# Patient Record
Sex: FEMALE | Race: WHITE | NOT HISPANIC OR LATINO | Employment: UNEMPLOYED | URBAN - METROPOLITAN AREA
[De-identification: names, ages, dates, MRNs, and addresses within clinical notes are randomized per-mention and may not be internally consistent; named-entity substitution may affect disease eponyms.]

---

## 2017-01-11 ENCOUNTER — OFFICE VISIT - RIVER FALLS (OUTPATIENT)
Dept: FAMILY MEDICINE | Facility: CLINIC | Age: 62
End: 2017-01-11
Payer: OTHER GOVERNMENT

## 2017-01-11 ENCOUNTER — COMMUNICATION - RIVER FALLS (OUTPATIENT)
Dept: FAMILY MEDICINE | Facility: CLINIC | Age: 62
End: 2017-01-11
Payer: OTHER GOVERNMENT

## 2017-01-12 LAB
CREAT SERPL-MCNC: 0.59 MG/DL (ref 0.5–0.99)
GLUCOSE BLD-MCNC: 106 MG/DL (ref 65–99)
HBA1C MFR BLD: 5.8 %

## 2017-02-13 ENCOUNTER — PRE VISIT (OUTPATIENT)
Dept: NEUROLOGY | Facility: CLINIC | Age: 62
End: 2017-02-13

## 2017-02-13 NOTE — TELEPHONE ENCOUNTER
1.  Date/reason for appt:  2/21/17   DBS Eval, Neuro Pain/Weakness    2.  Referring provider:  [facility]    3.  Call to patient (Yes / No - short description):  No, referred.  Also followed at [facility].    All records ([facility] and [facility]) previously sent to clinic.  All imaging is in PACS.

## 2017-03-22 ENCOUNTER — OFFICE VISIT - RIVER FALLS (OUTPATIENT)
Dept: FAMILY MEDICINE | Facility: CLINIC | Age: 62
End: 2017-03-22
Payer: OTHER GOVERNMENT

## 2017-09-05 ENCOUNTER — OFFICE VISIT - RIVER FALLS (OUTPATIENT)
Dept: FAMILY MEDICINE | Facility: CLINIC | Age: 62
End: 2017-09-05
Payer: OTHER GOVERNMENT

## 2017-09-08 ENCOUNTER — OFFICE VISIT - RIVER FALLS (OUTPATIENT)
Dept: FAMILY MEDICINE | Facility: CLINIC | Age: 62
End: 2017-09-08
Payer: OTHER GOVERNMENT

## 2017-09-11 ENCOUNTER — OFFICE VISIT - RIVER FALLS (OUTPATIENT)
Dept: FAMILY MEDICINE | Facility: CLINIC | Age: 62
End: 2017-09-11
Payer: OTHER GOVERNMENT

## 2017-09-13 ENCOUNTER — OFFICE VISIT - RIVER FALLS (OUTPATIENT)
Dept: FAMILY MEDICINE | Facility: CLINIC | Age: 62
End: 2017-09-13
Payer: OTHER GOVERNMENT

## 2017-09-19 ENCOUNTER — OFFICE VISIT - RIVER FALLS (OUTPATIENT)
Dept: FAMILY MEDICINE | Facility: CLINIC | Age: 62
End: 2017-09-19
Payer: OTHER GOVERNMENT

## 2017-10-04 ENCOUNTER — OFFICE VISIT - RIVER FALLS (OUTPATIENT)
Dept: FAMILY MEDICINE | Facility: CLINIC | Age: 62
End: 2017-10-04
Payer: OTHER GOVERNMENT

## 2017-12-19 ENCOUNTER — OFFICE VISIT - RIVER FALLS (OUTPATIENT)
Dept: FAMILY MEDICINE | Facility: CLINIC | Age: 62
End: 2017-12-19
Payer: OTHER GOVERNMENT

## 2018-01-25 ENCOUNTER — OFFICE VISIT - RIVER FALLS (OUTPATIENT)
Dept: FAMILY MEDICINE | Facility: CLINIC | Age: 63
End: 2018-01-25
Payer: OTHER GOVERNMENT

## 2018-02-06 ENCOUNTER — OFFICE VISIT - RIVER FALLS (OUTPATIENT)
Dept: FAMILY MEDICINE | Facility: CLINIC | Age: 63
End: 2018-02-06
Payer: OTHER GOVERNMENT

## 2018-02-21 ENCOUNTER — OFFICE VISIT - RIVER FALLS (OUTPATIENT)
Dept: FAMILY MEDICINE | Facility: CLINIC | Age: 63
End: 2018-02-21
Payer: OTHER GOVERNMENT

## 2018-04-06 ENCOUNTER — OFFICE VISIT - RIVER FALLS (OUTPATIENT)
Dept: FAMILY MEDICINE | Facility: CLINIC | Age: 63
End: 2018-04-06
Payer: OTHER GOVERNMENT

## 2018-08-07 ENCOUNTER — OFFICE VISIT - RIVER FALLS (OUTPATIENT)
Dept: FAMILY MEDICINE | Facility: CLINIC | Age: 63
End: 2018-08-07
Payer: OTHER GOVERNMENT

## 2018-08-13 ENCOUNTER — COMMUNICATION - RIVER FALLS (OUTPATIENT)
Dept: FAMILY MEDICINE | Facility: CLINIC | Age: 63
End: 2018-08-13
Payer: OTHER GOVERNMENT

## 2018-08-13 ENCOUNTER — OFFICE VISIT - RIVER FALLS (OUTPATIENT)
Dept: FAMILY MEDICINE | Facility: CLINIC | Age: 63
End: 2018-08-13
Payer: OTHER GOVERNMENT

## 2018-08-13 LAB
CREAT SERPL-MCNC: 0.62 MG/DL (ref 0.57–1.11)
GLUCOSE BLD-MCNC: 98 MG/DL (ref 65–100)

## 2018-10-11 ENCOUNTER — OFFICE VISIT - RIVER FALLS (OUTPATIENT)
Dept: FAMILY MEDICINE | Facility: CLINIC | Age: 63
End: 2018-10-11
Payer: OTHER GOVERNMENT

## 2018-12-11 ENCOUNTER — OFFICE VISIT - RIVER FALLS (OUTPATIENT)
Dept: FAMILY MEDICINE | Facility: CLINIC | Age: 63
End: 2018-12-11
Payer: OTHER GOVERNMENT

## 2018-12-13 ENCOUNTER — OFFICE VISIT - RIVER FALLS (OUTPATIENT)
Dept: FAMILY MEDICINE | Facility: CLINIC | Age: 63
End: 2018-12-13
Payer: OTHER GOVERNMENT

## 2019-02-17 ENCOUNTER — OFFICE VISIT - RIVER FALLS (OUTPATIENT)
Dept: FAMILY MEDICINE | Facility: CLINIC | Age: 64
End: 2019-02-17
Payer: OTHER GOVERNMENT

## 2019-04-05 ENCOUNTER — OFFICE VISIT - RIVER FALLS (OUTPATIENT)
Dept: FAMILY MEDICINE | Facility: CLINIC | Age: 64
End: 2019-04-05
Payer: OTHER GOVERNMENT

## 2019-05-09 ENCOUNTER — COMMUNICATION - RIVER FALLS (OUTPATIENT)
Dept: FAMILY MEDICINE | Facility: CLINIC | Age: 64
End: 2019-05-09
Payer: OTHER GOVERNMENT

## 2019-05-17 ENCOUNTER — OFFICE VISIT - RIVER FALLS (OUTPATIENT)
Dept: FAMILY MEDICINE | Facility: CLINIC | Age: 64
End: 2019-05-17
Payer: OTHER GOVERNMENT

## 2019-06-17 ENCOUNTER — OFFICE VISIT - RIVER FALLS (OUTPATIENT)
Dept: FAMILY MEDICINE | Facility: CLINIC | Age: 64
End: 2019-06-17
Payer: OTHER GOVERNMENT

## 2019-07-17 ENCOUNTER — OFFICE VISIT - RIVER FALLS (OUTPATIENT)
Dept: FAMILY MEDICINE | Facility: CLINIC | Age: 64
End: 2019-07-17
Payer: OTHER GOVERNMENT

## 2019-09-04 ENCOUNTER — OFFICE VISIT - RIVER FALLS (OUTPATIENT)
Dept: FAMILY MEDICINE | Facility: CLINIC | Age: 64
End: 2019-09-04
Payer: OTHER GOVERNMENT

## 2019-09-10 ENCOUNTER — AMBULATORY - HEALTHEAST (OUTPATIENT)
Dept: PALLIATIVE MEDICINE | Facility: OTHER | Age: 64
End: 2019-09-10

## 2019-09-10 DIAGNOSIS — G89.29 CHRONIC PAIN: ICD-10-CM

## 2019-09-10 DIAGNOSIS — F32.9 MAJOR DEPRESSIVE DISORDER: ICD-10-CM

## 2019-09-10 DIAGNOSIS — G57.10 MERALGIA PARESTHETICA: ICD-10-CM

## 2019-09-10 DIAGNOSIS — M79.2 NEUROPATHIC PAIN: ICD-10-CM

## 2019-09-10 DIAGNOSIS — I63.9 CEREBROVASCULAR ACCIDENT (H): ICD-10-CM

## 2019-09-24 ENCOUNTER — OFFICE VISIT - RIVER FALLS (OUTPATIENT)
Dept: FAMILY MEDICINE | Facility: CLINIC | Age: 64
End: 2019-09-24
Payer: OTHER GOVERNMENT

## 2019-11-15 ENCOUNTER — OFFICE VISIT - RIVER FALLS (OUTPATIENT)
Dept: FAMILY MEDICINE | Facility: CLINIC | Age: 64
End: 2019-11-15
Payer: OTHER GOVERNMENT

## 2019-12-03 ENCOUNTER — OFFICE VISIT - RIVER FALLS (OUTPATIENT)
Dept: FAMILY MEDICINE | Facility: CLINIC | Age: 64
End: 2019-12-03
Payer: OTHER GOVERNMENT

## 2019-12-03 LAB
BASOPHILS # BLD MANUAL: 0 10*3/UL
BASOPHILS NFR BLD MANUAL: 0.2 %
EOSINOPHIL # BLD MANUAL: 0.1 10*3/UL
EOSINOPHIL NFR BLD MANUAL: 0.9 %
ERYTHROCYTE [DISTWIDTH] IN BLOOD BY AUTOMATED COUNT: 14.1 % (ref 11.5–15.5)
HCT VFR BLD AUTO: 44.8 % (ref 33–51)
HGB BLD-MCNC: 14.5 G/DL (ref 12–16)
LYMPHOCYTES # BLD MANUAL: 2.2 10*3/UL (ref 0.9–2.9)
LYMPHOCYTES NFR BLD MANUAL: 17.8 %
MCH RBC QN AUTO: 29.3 PG (ref 26–34)
MCHC RBC AUTO-ENTMCNC: 32.4 G/DL (ref 32–36)
MCV RBC AUTO: 91 FL (ref 80–100)
MONOCYTES # BLD MANUAL: 0.8 10*3/UL
MONOCYTES NFR BLD MANUAL: 6.6 %
NEUTROPHILS # BLD MANUAL: 9.3 10*3/UL (ref 1.7–7)
NEUTROPHILS NFR BLD MANUAL: 74.5 %
PLATELET # BLD AUTO: 229 10*3/UL (ref 140–440)
PMV BLD: 9.2 FL (ref 6.5–11)
RBC # BLD AUTO: 4.95 10*6/UL (ref 4–5.2)
WBC # BLD AUTO: 12.5 10*3/UL (ref 4.5–11)

## 2020-01-10 ENCOUNTER — OFFICE VISIT - RIVER FALLS (OUTPATIENT)
Dept: FAMILY MEDICINE | Facility: CLINIC | Age: 65
End: 2020-01-10
Payer: MEDICARE

## 2020-01-14 ENCOUNTER — OFFICE VISIT - RIVER FALLS (OUTPATIENT)
Dept: FAMILY MEDICINE | Facility: CLINIC | Age: 65
End: 2020-01-14
Payer: MEDICARE

## 2020-03-18 ENCOUNTER — OFFICE VISIT - RIVER FALLS (OUTPATIENT)
Dept: FAMILY MEDICINE | Facility: CLINIC | Age: 65
End: 2020-03-18
Payer: MEDICARE

## 2020-06-23 ENCOUNTER — COMMUNICATION - RIVER FALLS (OUTPATIENT)
Dept: FAMILY MEDICINE | Facility: CLINIC | Age: 65
End: 2020-06-23
Payer: MEDICARE

## 2020-08-06 ENCOUNTER — OFFICE VISIT - RIVER FALLS (OUTPATIENT)
Dept: FAMILY MEDICINE | Facility: CLINIC | Age: 65
End: 2020-08-06
Payer: MEDICARE

## 2020-10-08 ENCOUNTER — OFFICE VISIT - RIVER FALLS (OUTPATIENT)
Dept: FAMILY MEDICINE | Facility: CLINIC | Age: 65
End: 2020-10-08
Payer: MEDICARE

## 2021-01-26 ENCOUNTER — OFFICE VISIT - RIVER FALLS (OUTPATIENT)
Dept: FAMILY MEDICINE | Facility: CLINIC | Age: 66
End: 2021-01-26
Payer: MEDICARE

## 2021-03-04 ENCOUNTER — OFFICE VISIT - RIVER FALLS (OUTPATIENT)
Dept: FAMILY MEDICINE | Facility: CLINIC | Age: 66
End: 2021-03-04
Payer: MEDICARE

## 2021-05-13 ENCOUNTER — OFFICE VISIT - RIVER FALLS (OUTPATIENT)
Dept: FAMILY MEDICINE | Facility: CLINIC | Age: 66
End: 2021-05-13
Payer: MEDICARE

## 2021-07-02 ENCOUNTER — OFFICE VISIT - RIVER FALLS (OUTPATIENT)
Dept: FAMILY MEDICINE | Facility: CLINIC | Age: 66
End: 2021-07-02
Payer: MEDICARE

## 2021-07-19 ENCOUNTER — OFFICE VISIT - RIVER FALLS (OUTPATIENT)
Dept: FAMILY MEDICINE | Facility: CLINIC | Age: 66
End: 2021-07-19
Payer: MEDICARE

## 2021-09-10 ENCOUNTER — OFFICE VISIT - RIVER FALLS (OUTPATIENT)
Dept: FAMILY MEDICINE | Facility: CLINIC | Age: 66
End: 2021-09-10
Payer: MEDICARE

## 2021-10-07 ENCOUNTER — COMMUNICATION - RIVER FALLS (OUTPATIENT)
Dept: FAMILY MEDICINE | Facility: CLINIC | Age: 66
End: 2021-10-07
Payer: MEDICARE

## 2021-11-05 ENCOUNTER — OFFICE VISIT - RIVER FALLS (OUTPATIENT)
Dept: FAMILY MEDICINE | Facility: CLINIC | Age: 66
End: 2021-11-05
Payer: MEDICARE

## 2021-12-23 ENCOUNTER — OFFICE VISIT - RIVER FALLS (OUTPATIENT)
Dept: FAMILY MEDICINE | Facility: CLINIC | Age: 66
End: 2021-12-23
Payer: MEDICARE

## 2022-01-20 ENCOUNTER — OFFICE VISIT - RIVER FALLS (OUTPATIENT)
Dept: FAMILY MEDICINE | Facility: CLINIC | Age: 67
End: 2022-01-20
Payer: MEDICARE

## 2022-02-11 VITALS
DIASTOLIC BLOOD PRESSURE: 67 MMHG | BODY MASS INDEX: 26.02 KG/M2 | HEART RATE: 93 BPM | TEMPERATURE: 97.6 F | SYSTOLIC BLOOD PRESSURE: 91 MMHG | WEIGHT: 151.6 LBS

## 2022-02-12 VITALS
DIASTOLIC BLOOD PRESSURE: 64 MMHG | SYSTOLIC BLOOD PRESSURE: 112 MMHG | BODY MASS INDEX: 28.49 KG/M2 | DIASTOLIC BLOOD PRESSURE: 80 MMHG | SYSTOLIC BLOOD PRESSURE: 119 MMHG | HEART RATE: 106 BPM | HEART RATE: 72 BPM | WEIGHT: 166 LBS | TEMPERATURE: 97.8 F | TEMPERATURE: 97.6 F

## 2022-02-12 VITALS
WEIGHT: 166.5 LBS | DIASTOLIC BLOOD PRESSURE: 83 MMHG | TEMPERATURE: 97.9 F | HEART RATE: 89 BPM | SYSTOLIC BLOOD PRESSURE: 136 MMHG | BODY MASS INDEX: 28.58 KG/M2

## 2022-02-12 VITALS
SYSTOLIC BLOOD PRESSURE: 103 MMHG | SYSTOLIC BLOOD PRESSURE: 128 MMHG | BODY MASS INDEX: 25.58 KG/M2 | HEART RATE: 79 BPM | DIASTOLIC BLOOD PRESSURE: 77 MMHG | WEIGHT: 149 LBS | TEMPERATURE: 97.2 F | SYSTOLIC BLOOD PRESSURE: 144 MMHG | DIASTOLIC BLOOD PRESSURE: 82 MMHG | SYSTOLIC BLOOD PRESSURE: 107 MMHG | HEART RATE: 74 BPM | DIASTOLIC BLOOD PRESSURE: 72 MMHG | HEART RATE: 89 BPM | HEART RATE: 81 BPM | BODY MASS INDEX: 25.23 KG/M2 | WEIGHT: 147 LBS | DIASTOLIC BLOOD PRESSURE: 88 MMHG | TEMPERATURE: 97.6 F | TEMPERATURE: 97.3 F

## 2022-02-12 VITALS
SYSTOLIC BLOOD PRESSURE: 126 MMHG | DIASTOLIC BLOOD PRESSURE: 73 MMHG | HEART RATE: 91 BPM | HEART RATE: 84 BPM | BODY MASS INDEX: 26.5 KG/M2 | DIASTOLIC BLOOD PRESSURE: 88 MMHG | TEMPERATURE: 98.6 F | SYSTOLIC BLOOD PRESSURE: 102 MMHG | TEMPERATURE: 97.9 F | WEIGHT: 154.4 LBS

## 2022-02-12 VITALS
SYSTOLIC BLOOD PRESSURE: 117 MMHG | HEART RATE: 111 BPM | TEMPERATURE: 97.8 F | WEIGHT: 152.2 LBS | BODY MASS INDEX: 26.13 KG/M2 | DIASTOLIC BLOOD PRESSURE: 77 MMHG

## 2022-02-12 VITALS
SYSTOLIC BLOOD PRESSURE: 102 MMHG | HEART RATE: 91 BPM | HEART RATE: 114 BPM | DIASTOLIC BLOOD PRESSURE: 97 MMHG | TEMPERATURE: 98.4 F | TEMPERATURE: 97.4 F | DIASTOLIC BLOOD PRESSURE: 71 MMHG | SYSTOLIC BLOOD PRESSURE: 144 MMHG

## 2022-02-12 VITALS
WEIGHT: 160 LBS | SYSTOLIC BLOOD PRESSURE: 122 MMHG | BODY MASS INDEX: 27.46 KG/M2 | TEMPERATURE: 97.3 F | DIASTOLIC BLOOD PRESSURE: 84 MMHG | HEART RATE: 102 BPM

## 2022-02-12 VITALS — SYSTOLIC BLOOD PRESSURE: 137 MMHG | DIASTOLIC BLOOD PRESSURE: 86 MMHG | TEMPERATURE: 96.6 F | HEART RATE: 71 BPM

## 2022-02-12 VITALS
TEMPERATURE: 97.8 F | HEART RATE: 91 BPM | DIASTOLIC BLOOD PRESSURE: 94 MMHG | SYSTOLIC BLOOD PRESSURE: 157 MMHG | WEIGHT: 157.8 LBS | BODY MASS INDEX: 27.09 KG/M2

## 2022-02-16 NOTE — PROGRESS NOTES
Patient:   NICK TRUONG            MRN: [id number]            FIN: [id number]               Age:   63 years     Sex:  Female     :  1955   Associated Diagnoses:   Chronic pain   Author:   Stanford Gayle MD      Visit Information      Date of Service: 2018 12:42 pm  Performing Location: [facility]  Encounter#: [id number]      Primary Care Provider (PCP):  Stanford Gayle MD    NPI# [id number]      Referring Provider:  Stanford Gayle MD    NPI# [id number]      Chief Complaint      History of Present Illness   Neuropathic pain   right sided continues  On gabapentin   No other new sxs  Both upper and lowe r extremity complaints  cymbalta as well         Review of Systems   Constitutional:  Negative except as documented in history of present illness.    Respiratory:  Negative.    Cardiovascular:  Negative.    Gastrointestinal:  Negative.    Genitourinary:  Negative.    Musculoskeletal:  Negative except as documented in history of present illness.    Integumentary:  Negative.    Neurologic:  Negative except as documented in history of present illness.              Health Status   Allergies:    Allergic Reactions (Selected)  Severity Not Documented  TraZODone (Anxiety)   Medications:  (Selected)   Prescriptions  Prescribed  Cymbalta 30 mg oral delayed release capsule: 1 cap(s) ( 30 mg ), po, daily, # 30 cap(s), 0 Refill(s), Type: Maintenance  oxyCODONE 5 mg oral capsule: 1 cap(s) ( 5 mg ), po, q12 hrs, # 30 cap(s), 0 Refill(s), Type: Maintenance  Documented Medications  Documented  Dulcolax Laxative: ( 5 mg ), po, daily, 0 Refill(s), Type: Maintenance  Incruse Ellipta 62.5 mcg/inh inhalation powder: ( 62.5 mcg ), inh, q 24 hrs, 0 Refill(s), Type: Maintenance  OxyCONTIN 10 mg oral tablet, extended release: 1 tab(s) ( 10 mg ), PO, q12hr, 0 Refill(s), Type: Maintenance  RisperDAL 1 mg oral tablet: See Instructions, Instructions: 2mg qam, 1mg in afternoon and 1mg at HS., 0 Refill(s),  Type: Maintenance  Tylenol 500 mg oral tablet: 1 tab(s) ( 500 mg ), PO, q4hr, PRN: for pain, 0 Refill(s), Type: Maintenance  Ventolin HFA 90 mcg/inh inhalation aerosol: 2 puff(s), inh, q4 hrs, PRN: as needed for wheezing, 0 Refill(s), Type: Maintenance  aspirin 81 mg oral tablet: 1 tab(s) ( 81 mg ), po, hs, 0 Refill(s), Type: Maintenance  bisacodyl: ( 5 mg ), po, daily, 0 Refill(s), Type: Maintenance  cholecalciferol 1000 intl units oral capsule: See Instructions, Instructions: 1 cap(s) po bid, 0 Refill(s), Type: Maintenance  clonazePAM 0.5 mg oral tablet: 0.5 tab(s) ( 0.25 mg ), po, qam, Instructions: and 1 tab(s) at hs, 0 Refill(s), Type: Maintenance  gabapentin 600 mg oral tablet: 1 tab(s) ( 600 mg ), PO, TID, # 270 tab(s), 0 Refill(s), Type: Maintenance  lidocaine 5% topical film: See Instructions, Instructions: 1 patch(es) to painful area of skin for up to 12hrs within a 24hr period, 0 Refill(s), Type: Maintenance  omega-3 polyunsaturated fatty acids 1000 mg oral capsule: 1 cap(s) ( 1,000 mg ), po, daily, 0 Refill(s), Type: Maintenance,    Medications          *denotes recorded medication          Cymbalta 30 mg oral delayed release capsule: 30 mg, 1 cap(s), po, daily, 30 cap(s), 0 Refill(s).          *Tylenol 500 mg oral tablet: 500 mg, 1 tab(s), PO, q4hr, PRN: for pain, 0 Refill(s).          *Ventolin HFA 90 mcg/inh inhalation aerosol: 2 puff(s), inh, q4 hrs, PRN: as needed for wheezing, 0 Refill(s).          *aspirin 81 mg oral tablet: 81 mg, 1 tab(s), po, hs, 0 Refill(s).          *bisacodyl: 5 mg, po, daily, 0 Refill(s).          *Dulcolax Laxative: 5 mg, po, daily, 0 Refill(s).          *cholecalciferol 1000 intl units oral capsule: See Instructions, 1 cap(s) po bid, 0 Refill(s).          *clonazePAM 0.5 mg oral tablet: 0.25 mg, 0.5 tab(s), po, qam, and 1 tab(s) at hs, 0 Refill(s).          *gabapentin 600 mg oral tablet: 600 mg, 1 tab(s), PO, TID, 270 tab(s), 0 Refill(s).          *lidocaine 5% topical  film: See Instructions, 1 patch(es) to painful area of skin for up to 12hrs within a 24hr period, 0 Refill(s).          *omega-3 polyunsaturated fatty acids 1000 mg oral capsule: 1,000 mg, 1 cap(s), po, daily, 0 Refill(s).          *OxyCONTIN 10 mg oral tablet, extended release: 10 mg, 1 tab(s), PO, q12hr, 0 Refill(s).          oxyCODONE 5 mg oral capsule: 5 mg, 1 cap(s), po, q12 hrs, 30 cap(s), 0 Refill(s).          *RisperDAL 1 mg oral tablet: See Instructions, 2mg qam, 1mg in afternoon and 1mg at HS., 0 Refill(s).          *Incruse Ellipta 62.5 mcg/inh inhalation powder: 62.5 mcg, inh, q 24 hrs, 0 Refill(s).     Problem list:    All Problems  Alcoholic hepatitis / SNOMED CT 290146912 / Confirmed  Anemia / SNOMED CT 521600124 / Confirmed  Anxiety / SNOMED CT 3607846467 / Confirmed  Dysphagia / SNOMED CT 669896883 / Confirmed  Cerebral edema / SNOMED CT 8318945 / Confirmed  COPD (chronic obstructive pulmonary disease) / SNOMED CT 89434373 / Confirmed  Chronic pain / SNOMED CT 244378376 / Confirmed  CVA (cerebrovascular accident) / SNOMED CT 986951515 / Confirmed  Diabetes mellitus / SNOMED CT 397322898 / Confirmed  Left foot drop / SNOMED CT 16529015 / Confirmed  Headache / SNOMED CT 8763183633 / Confirmed  Tachycardia / SNOMED CT 7074681302 / Confirmed  Hyperglycemia / SNOMED CT 400195350 / Confirmed  Cognitive impairment / SNOMED CT 8815349306 / Confirmed  Left hemiparesis / SNOMED CT 796095795 / Confirmed  Left homonymous hemianopsia / SNOMED CT 43543073 / Confirmed  Hypotension / SNOMED CT 703240688 / Confirmed  Major depressive disorder / SNOMED CT 4789686002 / Confirmed  Cervical cancer / SNOMED CT 642667208 / Confirmed  Meralgia paraesthetica / SNOMED CT 799926820 / Confirmed  Neuropathic pain / SNOMED CT 282582101 / Confirmed  Neuropathy / SNOMED CT 1880285753 / Confirmed  Urinary retention / SNOMED CT 2535244132 / Confirmed  Thrombocytopenia / SNOMED CT 8486329632 / Confirmed  Seizure / SNOMED CT 454414364  / Confirmed  Tobacco user / SNOMED CT 891180016 / Probable  Vitamin D deficiency / SNOMED CT 54070762 / Confirmed  Vocal cord paralysis / SNOMED CT 769686607 / Confirmed      Histories   Past Medical History:    Active  Cervical cancer (312232983): Onset on 8/3/2015 at 60 years.  CVA (cerebrovascular accident) (090117328): Onset on 4/28/2015 at 59 years.  Left foot drop (89416314): Onset on 4/20/2015 at 59 years.  Neuropathy (4799852625): Onset on 3/20/2015 at 59 years.  Comments:  11/30/2016 CST 3:50 PM CST - Kitty Shook  Pain of lower extremity.  Urinary retention (5340372584): Onset on 3/12/2015 at 59 years.  Meralgia paraesthetica (505801571): Onset on 6/21/2014 at 59 years.  Chronic pain (192856948): Onset on 5/27/2014 at 58 years.  Cerebral edema (4543014): Onset on 1/10/2014 at 58 years.  Headache (0221307935): Onset on 1/10/2014 at 58 years.  Tachycardia (4850305035): Onset on 8/6/2012 at 57 years.  Seizure (366671295): Onset on 5/30/2011 at 56 years.  Cognitive impairment (8484945276): Onset on 10/17/2010 at 55 years.  Left homonymous hemianopsia (93179663): Onset on 10/17/2010 at 55 years.  Dysphagia (758927560): Onset on 10/17/2010 at 55 years.  Vitamin D deficiency (12385990): Onset on 10/14/2010 at 55 years.  Vocal cord paralysis (996975815): Onset on 10/12/2010 at 55 years.  Hyperglycemia (622358578): Onset on 9/30/2010 at 55 years.  Major depressive disorder (4888568272): Onset on 10/11/2007 at 52 years.  Left hemiparesis (385710415)  Hypotension (601482943)  Thrombocytopenia (4215512661)  Anemia (695937506)  Alcoholic hepatitis (319437830)  COPD (chronic obstructive pulmonary disease) (97303460)  Diabetes mellitus (573221860)  Comments:  4/13/2018 CDT 10:19 AM CDT - Valdez Isaacice  BP Goal: <130/80  Anxiety (3565674983)  Neuropathic pain (914548530)   Family History:    Diabetes mellitus  Grandmother (M)  Grandfather (M)  Aunt (M)  Breast cancer  Mother (Carmen): onset at 40 .  Aunt (M)  Sister:  onset at 30 .  Heart disease  Grandfather (M)     Procedure history:    [facility]SO - Total abdominal hysterectomy and bilateral salpingo-oophorectomy (SNOMED CT 2600208469) on 2012 at 57 Years.   delivery (SNOMED CT 9159940373) in  at 26 Years.  Hysteroscopy (SNOMED CT 673947194).   Social History:        Tobacco Assessment: Current            Current every day smoker, Cigarettes, Total pack years: 20.                     Comments:                      2016 - Kitty Shook                     1/2 pack per day.      Substance Abuse Assessment: Denies Substance Abuse      Home and Environment Assessment            Marital status: .        Physical Examination   VS/Measurements   General:  Alert and oriented, No acute distress.    Musculoskeletal:  No tenderness, No swelling.    Integumentary:  No rash.    Neurologic:  Alert, Oriented, right sided hemiplegia.       Impression and Plan   Diagnosis     Chronic pain (YDI41-JR G89.29).     Course:  Worsening.    Plan:  no change.    Patient Instructions:       Counseled: Patient, Regarding diagnosis, Regarding treatment, Regarding medications.

## 2022-02-16 NOTE — TELEPHONE ENCOUNTER
---------------------  From: Steffanie Thompson CMA (Phone Messages Pool ([id number]))   To: TFS Message Pool ([id number]);     Sent: 7/12/2021 11:47:13 AM CDT  Subject: med refill request     Patient called asking for Lidocaine patches to be renewed. Last filled: 8/14/2020. Patient aware TFS out of clinic today.     [facility]---------------------  From: Renetta Kraus CMA (TFS Message Pool ([id number]_[facility]))   To: Stanford Gayle MD;     Sent: 7/13/2021 9:18:05 AM CDT  Subject: FW: med refill request     Last filled 8/14/20 #30 and 11 RF.    Last visit 7/2/21 jannie lau TFS.---------------------  From: Stanford Gayle MD   To: TFS Message Pool ([id number] [facility] [place]);     Sent: 7/13/2021 9:35:10 AM CDT  Subject: RE: med refill request     ok to refill same # and directionsRefilled.Carmen calling stating she called 3 weeks ago to get refill of pt's lidocaine patches. Told her that Rx was sent in 7/13. Rx sent to [facility] but suppose to go to [facility] per original message.  Rx resent to correct pharmacy.

## 2022-02-16 NOTE — PROGRESS NOTES
Patient:   NICK TRUONG            MRN: [id number]            FIN: [id number]               Age:   64 years     Sex:  Female     :  1955   Associated Diagnoses:   Chronic pain   Author:   Stanford Gayle MD      Visit Information      Date of Service: 2020 08:30 am  Performing Location: [facility]  Encounter#: [id number]      Primary Care Provider (PCP):  Stanford Gayle MD    NPI# [id number]      Referring Provider:  No referring provider recorded for selected visit.      Chief Complaint      History of Present Illness   Neuropathic pain   right sided continues   On gabapentin   No other new sxs  Both upper and lower extremity complaints  cymbalta as well         Review of Systems   Constitutional:  Negative except as documented in history of present illness.    Respiratory:  Negative.    Cardiovascular:  Negative.    Gastrointestinal:  Negative.    Genitourinary:  Negative.    Musculoskeletal:  Negative except as documented in history of present illness.    Integumentary:  Negative.    Neurologic:  Negative except as documented in history of present illness.              Health Status   Allergies:    Allergic Reactions (Selected)  Severity Not Documented  TraZODone (Anxiety)   Medications:  (Selected)   Prescriptions  Prescribed  Cymbalta 30 mg oral delayed release capsule: = 2 cap(s) ( 60 mg ), po, bid, # 360 cap(s), 3 Refill(s), Type: Maintenance  Luminas Pain Relief Patch: Luminas Pain Relief Patch, See Instructions, Instructions: Apply 1 patch every 24 hours, Supply, # 1 box(es), 11 Refill(s), Type: Maintenance  clonazePAM 0.125 mg oral tablet, disintegratin tab(s) ( 0.125 mg ), Oral, bid, # 60 tab(s), 0 Refill(s), Type: Maintenance  lidocaine 5% topical film: 1 patch(es), Topical, daily, # 30 patch(es), 11 Refill(s), Type: Maintenance  oxyCODONE 5 mg oral tablet: = 1 tab(s) ( 5 mg ), Oral, hs, # 30 tab(s), 0 Refill(s), Type: Maintenance  Documented  Medications  Documented  Dulcolax Laxative: = 2 tab(s), po, daily, 0 Refill(s), Type: Maintenance  Incruse Ellipta 62.5 mcg/inh inhalation powder: ( 62.5 mcg ), inh, q 24 hrs, 0 Refill(s), Type: Maintenance  MiraLax oral powder for reconstitution: ( 17 gm ), Oral, every other day, 0 Refill(s), Type: Maintenance  RisperDAL 1 mg oral tablet: = 1 tab(s) ( 1 mg ), Oral, daily, Instructions: 1mg BID and 0.5mg at HS, 0 Refill(s), Type: Maintenance  Ventolin HFA 90 mcg/inh inhalation aerosol: 2 puff(s), inh, q4 hrs, PRN: as needed for wheezing, 0 Refill(s), Type: Maintenance  acetaminophen 500 mg oral tablet: = 2 tab(s) ( 1,000 mg ), Oral, bid, Instructions: and 2 tabs prn q 6 hours for pain 6-10, 1 tab prn  for pain 1-5, PRN: as needed for pain, 0 Refill(s), Type: Maintenance  aspirin 81 mg oral tablet: 1 tab(s) ( 81 mg ), po, hs, 0 Refill(s), Type: Maintenance  gabapentin 300 mg oral capsule: = 3 cap(s) ( 900 mg ), Oral, hs, Instructions: takes 600mg Qam and 600mg at lunch and 900mg at HS, 0 Refill(s), Type: Maintenance   Problem list:    All Problems (Selected)  Alcoholic hepatitis / SNOMED CT 541633575 / Confirmed  Anemia / SNOMED CT 622122773 / Confirmed  Anxiety / SNOMED CT 6439496157 / Confirmed  Dysphagia / SNOMED CT 379137489 / Confirmed  Cerebral edema / SNOMED CT 3079444 / Confirmed  COPD (chronic obstructive pulmonary disease) / SNOMED CT 57354179 / Confirmed  Chronic pain / SNOMED CT 019654305 / Confirmed  CVA (cerebrovascular accident) / SNOMED CT 391951982 / Confirmed  Diabetes mellitus / SNOMED CT 081648146 / Confirmed  Left foot drop / SNOMED CT 24657505 / Confirmed  Headache / SNOMED CT 8473921009 / Confirmed  Tachycardia / SNOMED CT 7239309574 / Confirmed  Hyperglycemia / SNOMED CT 426486287 / Confirmed  Cognitive impairment / SNOMED CT 7756419275 / Confirmed  Left hemiparesis / SNOMED CT 438294796 / Confirmed  Left homonymous hemianopsia / SNOMED CT 97467092 / Confirmed  Hypotension / SNOMED CT  854356393 / Confirmed  Major depressive disorder / SNOMED CT 6726618713 / Confirmed  Meralgia paraesthetica / SNOMED CT 743745539 / Confirmed  Neuropathic pain / SNOMED CT 362420084 / Confirmed  Neuropathy / SNOMED CT 9228362070 / Confirmed  Urinary retention / SNOMED CT 1569212032 / Confirmed  Thrombocytopenia / SNOMED CT 7301675994 / Confirmed  Tobacco user / SNOMED CT 589898443 / Probable  Vitamin D deficiency / SNOMED CT 43799652 / Confirmed  Vocal cord paralysis / SNOMED CT 697570020 / Confirmed      Histories   Social History:        Tobacco Assessment: Current            Current every day smoker, Cigarettes, Total pack years: 20.                     Comments:                      11/30/2016 - Kitty Shook                     1/2 pack per day.      Substance Abuse Assessment: Denies Substance Abuse      Employment and Education Assessment            Disability      Home and Environment Assessment            Marital status: .        Physical Examination   VS/Measurements   General:  Alert and oriented, No acute distress.    Neck:  Supple.    Respiratory:  Respirations are non-labored.    Cardiovascular:  Normal rate, Regular rhythm.    Musculoskeletal:  No tenderness, No swelling.    Integumentary:  No rash.    Neurologic:  Alert, Oriented, right sided hemiplegia.       Impression and Plan   Diagnosis     Chronic pain (SWQ14-HA G89.29).     Course:  Unchanged.    Plan:  tapering narcotics.    Patient Instructions:       Counseled: Patient, Regarding diagnosis, Regarding treatment, Regarding medications.

## 2022-02-16 NOTE — NURSING NOTE
Comprehensive Intake Entered On:  12/3/2019 10:31 AM CST    Performed On:  12/3/2019 10:30 AM CST by Scarlett Isaac               Summary   Chief Complaint :   Possible pneumonia per Kinni   Systolic Blood Pressure :   102 mmHg   Diastolic Blood Pressure :   71 mmHg   Mean Arterial Pressure :   81 mmHg   Peripheral Pulse Rate :   114 bpm (HI)    Temperature Tympanic :   97.4 DegF(Converted to: 36.3 DegC)  (LOW)    Scarlett Isaac - 12/3/2019 10:30 AM CST

## 2022-02-16 NOTE — PROGRESS NOTES
Patient:   NICK TRUONG            MRN: [id number]            FIN: [id number]               Age:   61 years     Sex:  Female     :  1955   Associated Diagnoses:   Neuropathy; Major depressive disorder; CVA (cerebrovascular accident); Cognitive impairment   Author:   Stanford Gayle MD      Visit Information      Date of Service: 2017 02:39 pm  Performing Location: [facility]  Encounter#: [id number]      Primary Care Provider (PCP):  Stanford Gayle MD    NPI# [id number]      Referring Provider:  No referring provider recorded for selected visit.      Chief Complaint   3/22/2017 2:44 PM CDT    Discuss recommendations made by chiropractor        History of Present Illness   chief complaint and symptoms as noted above confirmed with patient       Review of Systems            Health Status   Allergies:    Allergic Reactions (Selected)  Severity Not Documented  TraZODone (Anxiety)   Medications:  (Selected)   Documented Medications  Documented  MiraLax oral powder for reconstitution: ( 17 gm ), po, daily, 0 Refill(s), Type: Maintenance  OxyCONTIN 10 mg oral tablet, extended release: 1 tab(s) ( 10 mg ), PO, q12hr, 0 Refill(s), Type: Maintenance  RisperDAL 1 mg oral tablet: 1 tab(s) ( 1 mg ), PO, BID, Instructions: 1 tablet po in the morning, 1 tablet at noon and 2 tablets at bedtime, # 60 tab(s), 0 Refill(s), Type: Maintenance  Spiriva 18 mcg inhalation capsule: 1 cap(s) ( 18 mcg ), inh, daily, 0 Refill(s), Type: Maintenance  Tylenol 500 mg oral tablet: 1 tab(s) ( 500 mg ), PO, q4hr, PRN: for pain, 0 Refill(s), Type: Maintenance  Ventolin HFA 90 mcg/inh inhalation aerosol: 2 puff(s), inh, q4 hrs, PRN: as needed for wheezing, 0 Refill(s), Type: Maintenance  aspirin 81 mg oral tablet: 1 tab(s) ( 81 mg ), po, hs, 0 Refill(s), Type: Maintenance  bisacodyl: ( 5 mg ), po, daily, 0 Refill(s), Type: Maintenance  cholecalciferol 1000 intl units oral capsule: See Instructions, Instructions: 1  cap(s) po bid, 0 Refill(s), Type: Maintenance  clonazePAM 0.5 mg oral tablet: 0.5 tab(s) ( 0.25 mg ), po, qam, Instructions: and 1 tab(s) at hs, 0 Refill(s), Type: Maintenance  gabapentin 300 mg oral capsule: 1 cap(s) ( 300 mg ), po, tid, 0 Refill(s), Type: Maintenance  lamoTRIgine 25 mg oral tablet: 1 tab(s) ( 25 mg ), PO, BID, 0 Refill(s), Type: Maintenance  lidocaine 5% topical film: See Instructions, Instructions: 1 patch(es) to painful area of skin for up to 12hrs within a 24hr period, 0 Refill(s), Type: Maintenance  multivitamin with minerals (w/ Iron): 1 tablet, po, daily, 0 Refill(s), Type: Maintenance  omega-3 polyunsaturated fatty acids 1000 mg oral capsule: 1 cap(s) ( 1,000 mg ), po, daily, 0 Refill(s), Type: Maintenance  oxyCODONE 5 mg oral capsule: 1 cap(s) ( 5 mg ), po, q6 hrs, Instructions: 1-2 tablets po every 4 hrs if needed for pain. Maximum of 2 tablets per day, 0 Refill(s), Type: Maintenance  venlafaxine 75 mg oral tablet: 2 tab(s) ( 150 mg ), po, daily, 0 Refill(s), Type: Maintenance   Problem list:    All Problems  CVA (cerebrovascular accident) / SNOMED CT 898284596 / Confirmed  Left hemiparesis / SNOMED CT 304494725 / Confirmed  Seizure / SNOMED CT 311382692 / Confirmed  Hypotension / SNOMED CT 617647565 / Confirmed  Thrombocytopenia / SNOMED CT 5259243981 / Confirmed  Anemia / SNOMED CT 418183431 / Confirmed  Cervical cancer / SNOMED CT 342013401 / Confirmed  Alcoholic hepatitis / SNOMED CT 709400184 / Confirmed  COPD (chronic obstructive pulmonary disease) / SNOMED CT 82849129 / Confirmed  Diabetes mellitus / SNOMED CT 385708123 / Confirmed  Tobacco user / SNOMED CT 898285863 / Probable  Left foot drop / SNOMED CT 78069409 / Confirmed  Neuropathy / SNOMED CT 7776723788 / Confirmed  Anxiety / SNOMED CT 7702117141 / Confirmed  Major depressive disorder / SNOMED CT 7614880013 / Confirmed  Hyperglycemia / SNOMED CT 947903402 / Confirmed  Vocal cord paralysis / SNOMED CT 866030596 /  Confirmed  Vitamin D deficiency / SNOMED CT 55875236 / Confirmed  Cognitive impairment / SNOMED CT 0433959182 / Confirmed  Left homonymous hemianopsia / SNOMED CT 10849933 / Confirmed  Dysphagia / SNOMED CT 726864287 / Confirmed  Tachycardia / SNOMED CT 3435398304 / Confirmed  Cerebral edema / SNOMED CT 2343817 / Confirmed  Headache / SNOMED CT 7014892766 / Confirmed  Chronic pain / SNOMED CT 382883275 / Confirmed  Meralgia paraesthetica / SNOMED CT 843119212 / Confirmed  Urinary retention / SNOMED CT 5010306483 / Confirmed      Histories   Past Medical History:    Active  Cervical cancer (805541585): Onset on 8/3/2015 at 60 years.  CVA (cerebrovascular accident) (057611321): Onset on 4/28/2015 at 59 years.  Left foot drop (20253232): Onset on 4/20/2015 at 59 years.  Neuropathy (9725387871): Onset on 3/20/2015 at 59 years.  Comments:  11/30/2016 CST 3:50 PM CST - Kitty Shook  Pain of lower extremity.  Urinary retention (3666604402): Onset on 3/12/2015 at 59 years.  Meralgia paraesthetica (936640256): Onset on 6/21/2014 at 59 years.  Chronic pain (174687913): Onset on 5/27/2014 at 58 years.  Cerebral edema (7899803): Onset on 1/10/2014 at 58 years.  Headache (1587230063): Onset on 1/10/2014 at 58 years.  Tachycardia (5009383094): Onset on 8/6/2012 at 57 years.  Seizure (559890300): Onset on 5/30/2011 at 56 years.  Cognitive impairment (9549341031): Onset on 10/17/2010 at 55 years.  Left homonymous hemianopsia (31393231): Onset on 10/17/2010 at 55 years.  Dysphagia (249110969): Onset on 10/17/2010 at 55 years.  Vitamin D deficiency (19634462): Onset on 10/14/2010 at 55 years.  Vocal cord paralysis (299692931): Onset on 10/12/2010 at 55 years.  Hyperglycemia (643061979): Onset on 9/30/2010 at 55 years.  Major depressive disorder (7259829076): Onset on 10/11/2007 at 52 years.  Left hemiparesis (674541375)  Hypotension (038492409)  Thrombocytopenia (5393268507)  Anemia (265650129)  Alcoholic hepatitis  (979470847)  COPD (chronic obstructive pulmonary disease) (13716518)  Diabetes mellitus (129423109)  Anxiety (9779164237)   Family History:    Diabetes mellitus  Grandmother (M)  Grandfather (M)  Aunt (M)  Breast cancer  Mother (Carmen): onset at 40 .  Aunt (M)  Sister: onset at 30 .  Heart disease  Grandfather (M)     Procedure history:    [facility] - Total abdominal hysterectomy and bilateral salpingo-oophorectomy (SNOMED CT 1175991291) on 2012 at 57 Years.   delivery (SNOMED CT 2178390813) in  at 26 Years.  Hysteroscopy (SNOMED CT 771673425).   Social History:        Tobacco Assessment: Current            Current every day smoker, Cigarettes, Total pack years: 20.                     Comments:                      2016 - Kitty Shook                     1/2 pack per day.      Substance Abuse Assessment: Denies Substance Abuse      Home and Environment Assessment            Marital status: .        Physical Examination   Vital Signs   3/22/2017 2:44 PM CDT Temperature Tympanic 97.6 DegF  LOW    Peripheral Pulse Rate 93 bpm    Systolic Blood Pressure 91 mmHg    Diastolic Blood Pressure 67 mmHg    Mean Arterial Pressure 75 mmHg    Vital Signs Comments Weight per kinnic      Measurements from flowsheet : Measurements   3/22/2017 2:44 PM CDT    Weight Measured - Standard                151.6 lb     General:  No acute distress.    Neurologic:  Alert.       Impression and Plan   Diagnosis     Neuropathy (QCJ75-ZE G62.9).     Major depressive disorder (XWZ51-JR F32.9).     CVA (cerebrovascular accident) (ZGM19-HL I63.9).     Cognitive impairment (CZI47-BQ R41.89).     Course:  Progressing as expected.    Plan:   15 min spent Face-to-face   reviewed and approved supplements.

## 2022-02-16 NOTE — TELEPHONE ENCOUNTER
---------------------  From: Sally Rucker (Appointment Pool ([id number]))   To: Surjit Young MD;     Sent: 7/19/2021 10:39:52 AM CDT  Subject: RE: nh visit     pt added        ---------------------  From: Surjit Young MD   To: Appointment Pool ([id number]);     Sent: 7/19/2021 10:11:20 AM CDT  Subject: nh visit     please add

## 2022-02-16 NOTE — PROGRESS NOTES
"   Patient:   NICK TRUONG            MRN: [id number]            FIN: [id number]               Age:   61 years     Sex:  Female     :  1955   Associated Diagnoses:   Alcoholic hepatitis; Cognitive impairment; COPD (chronic obstructive pulmonary disease); CVA (cerebrovascular accident); Diabetes mellitus; Left hemiparesis; Left homonymous hemianopsia; Major depressive disorder   Author:   Stanford Gayle MD      Visit Information      Date of Service: 2017 09:39 am  Performing Location: [facility]  Encounter#: [id number]      Primary Care Provider (PCP):  Stanford Gayle MD    NPI# [id number]      Referring Provider:  No referring provider recorded for selected visit.      Chief Complaint   2017 9:46 AM CST    Neuropathy        History of Present Illness   From  visit   \"The patient was admitted to the [facility] on 2016.  The patient is a 61-year-old female with a history of anxiety and cognitive impairment, past cervical cancer, meralgia paresthetica of the right side, left-sided weakness, past stroke, seizure disorder, and COPD.  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  She has also had an unintentional overdose of opiates.  She has chronic constipation.  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  She was admitted, it looks like, from [facility] where she had spent four days.  She was admitted there on  after being discharged from a TCU.  Apparently family was no longer able to provide the care she needed and  stated that she had exhausted her benefits in TCU and a long-term care facility would need to be sought, which is how she came here to the [facility]\"  2017 Gabapentin seemed to help but off now , r leg pain worse      Review of Systems   Constitutional:  Negative except as documented in history of present illness.    Respiratory:  Negative.    Cardiovascular:  Negative.    Gastrointestinal:  " Negative.    Genitourinary:  Negative.    Musculoskeletal:  Negative except as documented in history of present illness.    Integumentary:  Negative.    Neurologic:  Negative except as documented in history of present illness.             Health Status   Allergies:    Allergic Reactions (Selected)  Severity Not Documented  TraZODone (Anxiety)   Medications:  (Selected)   Documented Medications  Documented  MiraLax oral powder for reconstitution: ( 17 gm ), po, daily, 0 Refill(s), Type: Maintenance  OxyCONTIN 10 mg oral tablet, extended release: 1 tab(s) ( 10 mg ), PO, q12hr, 0 Refill(s), Type: Maintenance  RisperDAL 1 mg oral tablet: 1 tab(s) ( 1 mg ), PO, BID, Instructions: 1 tablet po in the morning, 1 tablet at noon and 2 tablets at bedtime, # 60 tab(s), 0 Refill(s), Type: Maintenance  Spiriva 18 mcg inhalation capsule: 1 cap(s) ( 18 mcg ), inh, daily, 0 Refill(s), Type: Maintenance  Tylenol 500 mg oral tablet: 1 tab(s) ( 500 mg ), PO, q4hr, PRN: for pain, 0 Refill(s), Type: Maintenance  Ventolin HFA 90 mcg/inh inhalation aerosol: 2 puff(s), inh, q4 hrs, PRN: as needed for wheezing, 0 Refill(s), Type: Maintenance  aspirin 81 mg oral tablet: 1 tab(s) ( 81 mg ), po, hs, 0 Refill(s), Type: Maintenance  bisacodyl: ( 5 mg ), po, daily, 0 Refill(s), Type: Maintenance  cholecalciferol 1000 intl units oral capsule: See Instructions, Instructions: 1 cap(s) po bid, 0 Refill(s), Type: Maintenance  clonazePAM 0.5 mg oral tablet: 0.5 tab(s) ( 0.25 mg ), po, qam, Instructions: and 1 tab(s) at hs, 0 Refill(s), Type: Maintenance  gabapentin 300 mg oral capsule: 1 cap(s) ( 300 mg ), po, tid, 0 Refill(s), Type: Maintenance  lamoTRIgine 25 mg oral tablet: 1 tab(s) ( 25 mg ), PO, BID, 0 Refill(s), Type: Maintenance  lidocaine 5% topical film: See Instructions, Instructions: 1 patch(es) to painful area of skin for up to 12hrs within a 24hr period, 0 Refill(s), Type: Maintenance  multivitamin with minerals (w/ Iron): 1 tablet, po,  daily, 0 Refill(s), Type: Maintenance  omega-3 polyunsaturated fatty acids 1000 mg oral capsule: 1 cap(s) ( 1,000 mg ), po, daily, 0 Refill(s), Type: Maintenance  oxyCODONE 5 mg oral capsule: 1 cap(s) ( 5 mg ), po, q6 hrs, Instructions: 1-2 tablets po every 4 hrs if needed for pain. Maximum of 2 tablets per day, 0 Refill(s), Type: Maintenance  venlafaxine 75 mg oral tablet: 2 tab(s) ( 150 mg ), po, daily, 0 Refill(s), Type: Maintenance   Problem list:    All Problems  CVA (cerebrovascular accident) / SNOMED CT 485757815 / Confirmed  Left hemiparesis / SNOMED CT 593234607 / Confirmed  Seizure / SNOMED CT 458846896 / Confirmed  Hypotension / SNOMED CT 518479952 / Confirmed  Thrombocytopenia / SNOMED CT 5803284127 / Confirmed  Anemia / SNOMED CT 873311435 / Confirmed  Cervical cancer / SNOMED CT 226656446 / Confirmed  Alcoholic hepatitis / SNOMED CT 033508929 / Confirmed  COPD (chronic obstructive pulmonary disease) / SNOMED CT 91854178 / Confirmed  Diabetes mellitus / SNOMED CT 737328459 / Confirmed  Tobacco user / SNOMED CT 026095014 / Probable  Left foot drop / SNOMED CT 49327207 / Confirmed  Neuropathy / SNOMED CT 6650635762 / Confirmed  Anxiety / SNOMED CT 0387112641 / Confirmed  Major depressive disorder / SNOMED CT 8971569477 / Confirmed  Hyperglycemia / SNOMED CT 322027032 / Confirmed  Vocal cord paralysis / SNOMED CT 674523116 / Confirmed  Vitamin D deficiency / SNOMED CT 24836564 / Confirmed  Cognitive impairment / SNOMED CT 0200415316 / Confirmed  Left homonymous hemianopsia / SNOMED CT 95474458 / Confirmed  Dysphagia / SNOMED CT 055134915 / Confirmed  Tachycardia / SNOMED CT 3577510400 / Confirmed  Cerebral edema / SNOMED CT 5211363 / Confirmed  Headache / SNOMED CT 9809133532 / Confirmed  Chronic pain / SNOMED CT 536918328 / Confirmed  Meralgia paraesthetica / SNOMED CT 807912461 / Confirmed  Urinary retention / SNOMED CT 9274157614 / Confirmed      Histories   Past Medical History:    Active  Cervical  cancer (586231351): Onset on 8/3/2015 at 60 years.  CVA (cerebrovascular accident) (670438289): Onset on 2015 at 59 years.  Left foot drop (05340542): Onset on 2015 at 59 years.  Neuropathy (0356412527): Onset on 3/20/2015 at 59 years.  Comments:  2016 CST 3:50 PM CST - Kitty Shook  Pain of lower extremity.  Urinary retention (5261097928): Onset on 3/12/2015 at 59 years.  Meralgia paraesthetica (039266994): Onset on 2014 at 59 years.  Chronic pain (341030200): Onset on 2014 at 58 years.  Cerebral edema (8547421): Onset on 1/10/2014 at 58 years.  Headache (0577322304): Onset on 1/10/2014 at 58 years.  Tachycardia (4151613695): Onset on 2012 at 57 years.  Seizure (186378667): Onset on 2011 at 56 years.  Cognitive impairment (0045372471): Onset on 10/17/2010 at 55 years.  Left homonymous hemianopsia (99124177): Onset on 10/17/2010 at 55 years.  Dysphagia (100400883): Onset on 10/17/2010 at 55 years.  Vitamin D deficiency (29741771): Onset on 10/14/2010 at 55 years.  Vocal cord paralysis (674361254): Onset on 10/12/2010 at 55 years.  Hyperglycemia (610378190): Onset on 2010 at 55 years.  Major depressive disorder (9308581205): Onset on 10/11/2007 at 52 years.  Left hemiparesis (211041886)  Hypotension (745777729)  Thrombocytopenia (5573966180)  Anemia (065100462)  Alcoholic hepatitis (499670636)  COPD (chronic obstructive pulmonary disease) (99555788)  Diabetes mellitus (116476217)  Anxiety (3290386770)   Family History:    Diabetes mellitus  Grandmother (M)  Grandfather (M)  Aunt (M)  Breast cancer  Mother (Carmen): onset at 40 .  Aunt (M)  Sister: onset at 30 .  Heart disease  Grandfather (M)     Procedure history:    [facility] - Total abdominal hysterectomy and bilateral salpingo-oophorectomy (SNOMED CT 3388375429) on 2012 at 57 Years.   delivery (SNOMED CT 0182139016) in  at 26 Years.  Hysteroscopy (SNOMED CT 552617902).   Social History:        Tobacco  Assessment: Current            Current every day smoker, Cigarettes, Total pack years: 20.                     Comments:                      11/30/2016 - Kitty Shook                     1/2 pack per day.      Substance Abuse Assessment: Denies Substance Abuse      Home and Environment Assessment            Marital status: .        Physical Examination   pt particpating in xmas activities   Vital Signs   1/11/2017 9:46 AM CST Temperature Tympanic 97.8 DegF  LOW    Peripheral Pulse Rate 111 bpm  HI    Systolic Blood Pressure 117 mmHg    Diastolic Blood Pressure 77 mmHg    Mean Arterial Pressure 90 mmHg      Measurements from flowsheet : Measurements   1/11/2017 9:46 AM CST    Weight Measured - Standard                152.2 lb     General:  No acute distress.    Neck:  Supple.    Respiratory:  Respirations are non-labored.    Cardiovascular:  Normal rate, Regular rhythm, No edema.    Integumentary:  No rash.    Neurologic:  Alert, left sided hemiparesis.       Impression and Plan   Diagnosis     Alcoholic hepatitis (TFR29-AA K70.10).     Cognitive impairment (MNS52-PH R41.89).     COPD (chronic obstructive pulmonary disease) (OEJ86-FK J44.9).     CVA (cerebrovascular accident) (BNB93-SA I63.9).     Diabetes mellitus (MSL18-AF E11.9).     Left hemiparesis (GLG24-PA G81.94).     Left homonymous hemianopsia (ZMN49-OP H53.462).     Major depressive disorder (ACJ05-ZQ F32.9).     Course:  Not progressing as expected.    Plan:  fu 1 month, add gabapentin and slowly increase  taper oxycodone.

## 2022-02-16 NOTE — TELEPHONE ENCOUNTER
After-hours phone message received at 4:42 PM. Call returned immediately. Notified by nursing home staff that patient was unresponsive. This is a change in her baseline. Order provided for patient to be transported to the emergency room for further evaluation and treatment.

## 2022-02-16 NOTE — PROGRESS NOTES
Patient:   NICK TRUONG            MRN: [id number]            FIN: [id number]               Age:   65 years     Sex:  Female     :  1955   Associated Diagnoses:   Chronic pain; COPD (chronic obstructive pulmonary disease); CVA (cerebrovascular accident); Left hemiparesis; Left homonymous hemianopsia   Author:   Stanford Gayle MD      Chief Complaint      History of Present Illness   Neuropathic pain   right sided continues   On gabapentin   No other new sxs  Both upper and lower extremity complaints  cymbalta as well         Review of Systems   Constitutional:  Negative except as documented in history of present illness.    Respiratory:  Negative.    Cardiovascular:  Negative.    Gastrointestinal:  Negative.    Genitourinary:  Negative.    Musculoskeletal:  Negative except as documented in history of present illness.    Integumentary:  Negative.    Neurologic:  Negative except as documented in history of present illness.              Health Status   Allergies:    Allergic Reactions (Selected)  Severity Not Documented  TraZODone (Anxiety)   Medications:  (Selected)   Prescriptions  Prescribed  Cymbalta 30 mg oral delayed release capsule: = 2 cap(s) ( 60 mg ), po, bid, # 360 cap(s), 3 Refill(s), Type: Maintenance  Luminas Pain Relief Patch: Luminas Pain Relief Patch, See Instructions, Instructions: Apply 1 patch every 24 hours, Supply, # 1 box(es), 11 Refill(s), Type: Maintenance  clonazePAM 0.125 mg oral tablet, disintegratin tab(s) ( 0.125 mg ), Oral, bid, # 60 tab(s), 0 Refill(s), Type: Maintenance  lidocaine 5% topical film: 1 patch(es), Topical, daily, # 30 patch(es), 11 Refill(s), Type: Maintenance, Pharmacy: [facility], 1 patch(es) Topical daily, Weight Measured  oxyCODONE 5 mg oral tablet: = 1 tab(s) ( 5 mg ), Oral, hs, PRN: as needed for pain, # 15 tab(s), 0 Refill(s), Type: Maintenance  Documented Medications  Documented  Dulcolax Laxative: = 2 tab(s), po, daily, 0 Refill(s), Type:  Maintenance  Incruse Ellipta 62.5 mcg/inh inhalation powder: ( 62.5 mcg ), inh, q 24 hrs, 0 Refill(s), Type: Maintenance  MiraLax oral powder for reconstitution: ( 17 gm ), Oral, every other day, 0 Refill(s), Type: Maintenance  RisperDAL 1 mg oral tablet: = 1 tab(s) ( 1 mg ), Oral, daily, Instructions: 1mg BID and 0.5mg at HS, 0 Refill(s), Type: Maintenance  Ventolin HFA 90 mcg/inh inhalation aerosol: 2 puff(s), inh, q4 hrs, PRN: as needed for wheezing, 0 Refill(s), Type: Maintenance  acetaminophen 500 mg oral tablet: = 2 tab(s) ( 1,000 mg ), Oral, bid, Instructions: and 2 tabs prn q 6 hours for pain 6-10, 1 tab prn  for pain 1-5, PRN: as needed for pain, 0 Refill(s), Type: Maintenance  aspirin 81 mg oral tablet: 1 tab(s) ( 81 mg ), po, hs, 0 Refill(s), Type: Maintenance  gabapentin 300 mg oral capsule: = 3 cap(s) ( 900 mg ), Oral, hs, Instructions: takes 600mg Qam and 600mg at lunch and 900mg at HS, 0 Refill(s), Type: Maintenance   Problem list:    All Problems (Selected)  Alcoholic hepatitis / SNOMED CT 947428118 / Confirmed  Anemia / SNOMED CT 128514074 / Confirmed  Anxiety / SNOMED CT 4835537207 / Confirmed  Dysphagia / SNOMED CT 226701977 / Confirmed  Cerebral edema / SNOMED CT 1311931 / Confirmed  COPD (chronic obstructive pulmonary disease) / SNOMED CT 70203068 / Confirmed  Chronic pain / SNOMED CT 749276825 / Confirmed  CVA (cerebrovascular accident) / SNOMED CT 558833451 / Confirmed  Diabetes mellitus / SNOMED CT 659060397 / Confirmed  Left foot drop / SNOMED CT 94296548 / Confirmed  Headache / SNOMED CT 0848112410 / Confirmed  Tachycardia / SNOMED CT 2689290249 / Confirmed  Hyperglycemia / SNOMED CT 595525191 / Confirmed  Cognitive impairment / SNOMED CT 2761703415 / Confirmed  Left hemiparesis / SNOMED CT 479986209 / Confirmed  Left homonymous hemianopsia / SNOMED CT 50846656 / Confirmed  Hypotension / SNOMED CT 607578407 / Confirmed  Major depressive disorder / SNOMED CT 9256231974 / Confirmed  Meralgia  paraesthetica / SNOMED CT 312155848 / Confirmed  Neuropathic pain / SNOMED CT 375437648 / Confirmed  Neuropathy / SNOMED CT 5087290988 / Confirmed  Urinary retention / SNOMED CT 2350064494 / Confirmed  Thrombocytopenia / SNOMED CT 1702788886 / Confirmed  Tobacco user / SNOMED CT 746994760 / Probable  Vitamin D deficiency / SNOMED CT 34886556 / Confirmed  Vocal cord paralysis / SNOMED CT 401597225 / Confirmed      Histories   Past Medical History:    Active  Alcoholic hepatitis (108561903): Onset in 2016 at 61 years.  CVA (cerebrovascular accident) (179404182): Onset on 4/28/2015 at 59 years.  Left foot drop (10914479): Onset on 4/20/2015 at 59 years.  Neuropathy (3885877855): Onset on 3/20/2015 at 59 years.  Comments:  11/30/2016 CST 3:50 PM CST - Kitty Shook  Pain of lower extremity.  Urinary retention (2109804583): Onset on 3/12/2015 at 59 years.  Meralgia paraesthetica (981831434): Onset on 6/21/2014 at 59 years.  Chronic pain (819414910): Onset on 5/27/2014 at 58 years.  Cerebral edema (8920020): Onset on 1/10/2014 at 58 years.  Headache (7172918470): Onset on 1/10/2014 at 58 years.  Tachycardia (0218192112): Onset on 8/6/2012 at 57 years.  Cognitive impairment (6583800781): Onset on 10/17/2010 at 55 years.  Left homonymous hemianopsia (85623620): Onset on 10/17/2010 at 55 years.  Dysphagia (303371312): Onset on 10/17/2010 at 55 years.  Vitamin D deficiency (13424749): Onset on 10/14/2010 at 55 years.  Vocal cord paralysis (473774265): Onset on 10/12/2010 at 55 years.  Hyperglycemia (010804504): Onset on 9/30/2010 at 55 years.  Major depressive disorder (3201438315): Onset on 10/11/2007 at 52 years.  Left hemiparesis (074261793)  Hypotension (883960796)  Thrombocytopenia (5337560114)  Anemia (390521248)  COPD (chronic obstructive pulmonary disease) (43303934)  Diabetes mellitus (632506871)  Comments:  4/13/2018 CDT 10:19 AM CDT - Scarlett Isaac  BP Goal: <130/80  Anxiety (3714606843)  Neuropathic pain  (864555411)  Resolved  Cervical cancer (340662626): Onset on 8/3/2015 at 60 years.  Resolved.   Family History:    Diabetes mellitus  Grandmother (M)  Grandfather (M)  Aunt (M)  Breast cancer  Mother (Carmen): onset at 40 .  Aunt (M)  Sister: onset at 30 .  Heart disease  Grandfather (M)     Procedure history:    [facility] - Total abdominal hysterectomy and bilateral salpingo-oophorectomy (SNOMED CT 4635456507) on 2012 at 57 Years.  Radiation (SNOMED CT 924496931) in  at 56 Years.  Comments:  2019 10:53 AM [facility] - Kitty Shook  Radiation treatment for cervical cancer.   delivery (SNOMED CT 9327592524) in  at 26 Years.  Hysteroscopy (SNOMED CT 403077517).   Social History:        Tobacco Assessment: Current            Current every day smoker, Cigarettes, Total pack years: 20.                     Comments:                      2016 - Kitty Shook                     1/2 pack per day.      Substance Abuse Assessment: Denies Substance Abuse      Employment and Education Assessment            Disability      Home and Environment Assessment            Marital status: .        Physical Examination   VS/Measurements   General:  Alert and oriented, No acute distress.    Neck:  Supple.    Respiratory:  Respirations are non-labored.    Cardiovascular:  Normal rate, Regular rhythm.    Musculoskeletal:  No tenderness, No swelling.    Integumentary:  No rash.    Neurologic:  Alert, Oriented, right sided hemiplegia.       Impression and Plan   Diagnosis     Chronic pain (BPC19-IR G89.29).     COPD (chronic obstructive pulmonary disease) (YWN07-EO J44.9).     CVA (cerebrovascular accident) (ZBY21-KL I63.9).     Left hemiparesis (XAZ41-SX G81.94).     Left homonymous hemianopsia (JDY41-MM H53.462).     Course:  Unchanged.    Plan:  down to 1 oxycodone daily.    Patient Instructions:       Counseled: Patient, Regarding diagnosis, Regarding treatment, Regarding medications.

## 2022-02-16 NOTE — TELEPHONE ENCOUNTER
---------------------  From: Kalpana Zacarias LPN (Phone Messages Pool ([id number]_[facility]))   To: TFS Message Pool ([id number]);     Sent: 2020 12:50:00 PM CDT  Subject: lidocaine patches     Phone Message    PCP:   TITO      Time of Call:  12:38pm       Person Calling:  Jadsowmya Quincy- BREEZY   Phone number:  [phone]    Note:   Carmen CALDERON stating pt's lidocaine [facility].  Please advise- last Rx 10/11/19 1 patch topical daily #30 with 11 refills    Last office visit and reason:  20 Kinni Rounds---------------------  From: Amanda Park CMA (TFS Message Pool ([id number]))   To: Stanford Gayle MD;     Sent: 2020 2:17:52 PM CDT  Subject: FW: lidocaine patches---------------------  From: Stanford Gayle MD   To: TFS Message Pool ([id number]);     Sent: 2020 2:42:59 PM CDT  Subject: RE: lidocaine patches     [facility] and Carmen expressed a good understanding.       LOKI Park CMA

## 2022-02-16 NOTE — PROGRESS NOTES
NICK TRUONG :  1955 MRN:  [id number]  [facility] Visit  Primary HCP: Stanford Gayle MD   S: The staff asked me to see this resident as she was feeling very weak today.  She has been more lethargic and stating she is unable to hold her head up.  She is having trouble staying awake.  Staff felt she is not having any trouble breathing and she complains of no chest pain or shortness of breath.  She is often incontinent of urine so this is not new.  She has no history of heart disease but does have chronic obstructive pulmonary disease.  She does not routinely need oxygen and her saturations have been unusually low today between 86 and 89%.  Her only routine chronic obstructive pulmonary disease medication is Incruse.  She has Ventolin p.r.n. but rarely uses it.  She also has significant chronic pain and is now following with [facility] and they have been reducing her oxycodone use.  She has been afebrile.   O: Blood pressure is 120/70.  Pulse 80.  Respiratory rate 18.  Temperature 98.5.  Current oxygen saturation is 92% on room air.  She is alert and answers my questions clearly.  She does appear tired and requires two people assist to lean forward to listen to her lungs.  She has coarse lung sounds throughout and some fine crackles in her upper fields.  There is no swelling in her lower extremities.  Apical is regular. Skin is a little diaphoretic and pale.    A: Hypoxia.    P: We will draw CBC with differential, Chem. 8, and chest x-ray this afternoon.  We will start oxygen per nasal cannula at 2 liters to keep saturations about 92% and start albuterol nebs three times a day.   D:  2018  T:  2018 Evonne Hartley RN, C-NP/sq    c:  [facility]

## 2022-02-16 NOTE — PROGRESS NOTES
Patient:   NICK TRUONG            MRN: [id number]            FIN: [id number]               Age:   62 years     Sex:  Female     :  1955   Associated Diagnoses:   Chronic pain   Author:   Stanford Gayle MD      Visit Information      Date of Service: 2018 09:39 am  Performing Location: [facility]  Encounter#: [id number]      Primary Care Provider (PCP):  Stanford Gayle MD    NPI# [id number]      Referring Provider:  Stanford Gayle MD, NPI# [id number]      Chief Complaint   2018 9:45 AM CST    chronic pain, buring throuhout body, mainly the right side. and head        History of Present Illness   Neuropathic pain worse last few recently  On gabapentin   No other new sxs  Both upper and lowe r extremity complaints  worse is right side      Review of Systems   Constitutional:  Negative except as documented in history of present illness.    Respiratory:  Negative.    Cardiovascular:  Negative.    Gastrointestinal:  Negative.    Genitourinary:  Negative.    Musculoskeletal:  Negative except as documented in history of present illness.    Integumentary:  Negative.    Neurologic:  Negative except as documented in history of present illness.              Health Status   Allergies:    Allergic Reactions (Selected)  Severity Not Documented  TraZODone (Anxiety)   Medications:  (Selected)   Prescriptions  Prescribed  oxyCODONE 5 mg oral capsule: 1 cap(s) ( 5 mg ), po, q12 hrs, # 30 cap(s), 0 Refill(s), Type: Maintenance  Documented Medications  Documented  Dulcolax Laxative: ( 5 mg ), po, daily, 0 Refill(s), Type: Maintenance  Incruse Ellipta 62.5 mcg/inh inhalation powder: ( 62.5 mcg ), inh, q 24 hrs, 0 Refill(s), Type: Maintenance  Milk of Magnesia: po, hs, 0 Refill(s), Type: Maintenance  OxyCONTIN 10 mg oral tablet, extended release: 1 tab(s) ( 10 mg ), PO, q12hr, 0 Refill(s), Type: Maintenance  RisperDAL 1 mg oral tablet: 1 tab(s) ( 1 mg ), PO, BID, Instructions: 1 tablet po  in the morning, 1 tablet at noon and 2 tablets at bedtime, # 60 tab(s), 0 Refill(s), Type: Maintenance  Tylenol 500 mg oral tablet: 1 tab(s) ( 500 mg ), PO, q4hr, PRN: for pain, 0 Refill(s), Type: Maintenance  Ventolin HFA 90 mcg/inh inhalation aerosol: 2 puff(s), inh, q4 hrs, PRN: as needed for wheezing, 0 Refill(s), Type: Maintenance  aspirin 81 mg oral tablet: 1 tab(s) ( 81 mg ), po, hs, 0 Refill(s), Type: Maintenance  bisacodyl: ( 5 mg ), po, daily, 0 Refill(s), Type: Maintenance  cholecalciferol 1000 intl units oral capsule: See Instructions, Instructions: 1 cap(s) po bid, 0 Refill(s), Type: Maintenance  clonazePAM 0.5 mg oral tablet: 0.5 tab(s) ( 0.25 mg ), po, qam, Instructions: and 1 tab(s) at hs, 0 Refill(s), Type: Maintenance  gabapentin 300 mg oral capsule: See Instructions, Instructions: 600-300-600, 0 Refill(s), Type: Maintenance  lidocaine 5% topical film: See Instructions, Instructions: 1 patch(es) to painful area of skin for up to 12hrs within a 24hr period, 0 Refill(s), Type: Maintenance  omega-3 polyunsaturated fatty acids 1000 mg oral capsule: 1 cap(s) ( 1,000 mg ), po, daily, 0 Refill(s), Type: Maintenance  venlafaxine 75 mg oral tablet: 2 tab(s) ( 150 mg ), po, daily, 0 Refill(s), Type: Maintenance   Problem list:    All Problems  Alcoholic hepatitis / SNOMED CT 947674532 / Confirmed  Anemia / SNOMED CT 344897566 / Confirmed  Anxiety / SNOMED CT 5842561674 / Confirmed  Dysphagia / SNOMED CT 202864943 / Confirmed  Cerebral edema / SNOMED CT 2083130 / Confirmed  COPD (chronic obstructive pulmonary disease) / SNOMED CT 41528007 / Confirmed  Chronic pain / SNOMED CT 294321026 / Confirmed  CVA (cerebrovascular accident) / SNOMED CT 988834795 / Confirmed  Diabetes mellitus / SNOMED CT 349165790 / Confirmed  Left foot drop / SNOMED CT 29255340 / Confirmed  Headache / SNOMED CT 3212472812 / Confirmed  Tachycardia / SNOMED CT 3492168026 / Confirmed  Hyperglycemia / SNOMED CT 000632711 /  Confirmed  Cognitive impairment / SNOMED CT 7158317046 / Confirmed  Left hemiparesis / SNOMED CT 406865684 / Confirmed  Left homonymous hemianopsia / SNOMED CT 59396901 / Confirmed  Hypotension / SNOMED CT 714373340 / Confirmed  Major depressive disorder / SNOMED CT 0780828732 / Confirmed  Cervical cancer / SNOMED CT 839638791 / Confirmed  Meralgia paraesthetica / SNOMED CT 982882720 / Confirmed  Neuropathic pain / SNOMED CT 684765875 / Confirmed  Neuropathy / SNOMED CT 7861936337 / Confirmed  Urinary retention / SNOMED CT 5618108291 / Confirmed  Thrombocytopenia / SNOMED CT 3992352277 / Confirmed  Seizure / SNOMED CT 569067495 / Confirmed  Tobacco user / SNOMED CT 144383345 / Probable  Vitamin D deficiency / SNOMED CT 41510912 / Confirmed  Vocal cord paralysis / SNOMED CT 511529673 / Confirmed      Histories   Past Medical History:    Active  Cervical cancer (276708583): Onset on 8/3/2015 at 60 years.  CVA (cerebrovascular accident) (303641149): Onset on 4/28/2015 at 59 years.  Left foot drop (98342270): Onset on 4/20/2015 at 59 years.  Neuropathy (3712958172): Onset on 3/20/2015 at 59 years.  Comments:  11/30/2016 CST 3:50 PM CST - Kitty Shook  Pain of lower extremity.  Urinary retention (1688427045): Onset on 3/12/2015 at 59 years.  Meralgia paraesthetica (632357078): Onset on 6/21/2014 at 59 years.  Chronic pain (316718254): Onset on 5/27/2014 at 58 years.  Cerebral edema (5064444): Onset on 1/10/2014 at 58 years.  Headache (0646003966): Onset on 1/10/2014 at 58 years.  Tachycardia (1379004939): Onset on 8/6/2012 at 57 years.  Seizure (668784693): Onset on 5/30/2011 at 56 years.  Cognitive impairment (7738539788): Onset on 10/17/2010 at 55 years.  Left homonymous hemianopsia (89938004): Onset on 10/17/2010 at 55 years.  Dysphagia (058949880): Onset on 10/17/2010 at 55 years.  Vitamin D deficiency (76258982): Onset on 10/14/2010 at 55 years.  Vocal cord paralysis (866439186): Onset on 10/12/2010 at 55  years.  Hyperglycemia (182723089): Onset on 2010 at 55 years.  Major depressive disorder (0909727356): Onset on 10/11/2007 at 52 years.  Left hemiparesis (772852302)  Hypotension (049390012)  Thrombocytopenia (0186804159)  Anemia (950177678)  Alcoholic hepatitis (897807509)  COPD (chronic obstructive pulmonary disease) (26535432)  Diabetes mellitus (825476154)  Anxiety (5859545264)  Neuropathic pain (183394542)   Family History:    Diabetes mellitus  Grandmother (M)  Grandfather (M)  Aunt (M)  Breast cancer  Mother ([place]): onset at 40 .  Aunt (M)  Sister: onset at 30 .  Heart disease  Grandfather (M)     Procedure history:    [facility] BSO - Total abdominal hysterectomy and bilateral salpingo-oophorectomy (SNOMED CT 4392646683) on 2012 at 57 Years.   delivery (SNOMED CT 0064551864) in  at 26 Years.  Hysteroscopy (SNOMED CT 549403936).   Social History:        Tobacco Assessment: Current            Current every day smoker, Cigarettes, Total pack years: 20.                     Comments:                      2016 - Kitty Shook                     1/2 pack per day.      Substance Abuse Assessment: Denies Substance Abuse      Home and Environment Assessment            Marital status: .        Physical Examination   Vital Signs   2018 9:45 AM CST Temperature Tympanic 98.6 DegF    Peripheral Pulse Rate 84 bpm    Pulse Site Radial artery    HR Method Manual    Systolic Blood Pressure 126 mmHg    Diastolic Blood Pressure 88 mmHg  HI    Mean Arterial Pressure 101 mmHg    BP Site Right arm    BP Method Manual      General:  Alert and oriented, No acute distress.    Musculoskeletal:  No tenderness, No swelling.    Integumentary:  No rash.    Neurologic:  Alert, Oriented, Cranial Nerves II-XII are grossly intact.       Impression and Plan   Diagnosis     Chronic pain (WNT25-SC G89.29).     Course:  Worsening.    Plan:  increase gabapentin.    Patient Instructions:       Counseled: Patient,  Regarding diagnosis, Regarding treatment, Regarding medications.

## 2022-02-16 NOTE — PROGRESS NOTES
NICK TRUONG  :  1955  10/09/2017  MRN:  [id number]  [facility] HEALTH & REHABILITATION VISIT    Primary HCP:  Stanford Gayle MD    S: This is a long-term resident of the [facility].  Staff has asked me to see her today for chronic pain.  This is not new pain.  The patient tells me that she has had pain in her right leg for years.  She has seen her provider multiple times, has seen other providers, work up has been done, and a number of medications have been tried without success.  She is currently on Gabapentin 600-mg in the morning and at bedtime and also takes an additional 300-mg in the afternoon.  She has been on this for a while but does not think it is helpful.   She is on a fairly low dose of Effexor for depression and that is 112.5-mg daily.  She is also taking some Tylenol p.r.n. for the pain.  She is on OxyContin 10-mg b.i.d. scheduled and also takes Oxycodone 5-mg b.i.d. for the pain.  Staff states that she will call out for  Oxycodone  as soon as five minutes after getting a dose.  She also uses a 5% Lidocaine patch to the right upper thigh for the pain in her leg.    Staff tells me that the patient does eat well and has normal bowel and bladder.  She is participating in some therapy for pain control, although she did not go today.  She has had no other changes in her health.   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  She tells me she has no pain in the left side; it is strictly in the right lower extremity although she gets shooting pains throughout her body as well.    O: Blood pressure is 114/80.  Pulse is 103.  Respiratory rate is 16.  Temperature is 98.4.  O2 SATs on room air 95%.  She has headphones on and takes them off so that we can talk.  She is alert, although she looks a little drowsy.  Her speech is clear.  Exam of the lower extremities reveals no warmth or erythema.  She can flex and straighten the leg.  There is no swelling in the  ankle.    A: 1) Chronic pain.    P: The patient and staff asked about stopping the Gabapentin but I would like her to continue with the Gabapentin as I think some of this is nerve pain.  We will increase her Effexor and see if that helps to augment the pain med schedule.  I do not want to increase any narcotics at this point.     D:  10/09/2017  T:  10/10/2017 NE Martinez/elicia    c:  [facility]

## 2022-02-16 NOTE — TELEPHONE ENCOUNTER
---------------------  From: Kalpana Zacarias LPN (Phone Messages Pool ([id number]_[facility]))   Sent: 11/20/2020 3:35:49 PM CST  Subject: oxycodone     Phone Message    PCP:   TITO asked for BLAS      Time of Call:  3:12pm       Person Calling:  Wilder Louise  Phone number:  [phone]  Returned call at: 3:33pm    Note:   Deisy LM stating she sent over a Rx for BLAS today. She says she called their pharamcy and it still has not been sent over.  Deisy says they do not have any more Oxycodone for her.    Returned call and informed Dani QUIÑONES sent over Rx already to pharmacy.    Last office visit and reason:  _

## 2022-02-16 NOTE — TELEPHONE ENCOUNTER
---------------------  From: Kellie Lockwood RN (Phone Messages Pool ([id number]_[facility]))   To: TFS Message Pool (89524[facility]);     Sent: 6/23/2020 3:13:26 PM CDT  Subject: Phone Message     Phone Message    PCP:   TITO      Time of Call:  1512       Person Calling:  Marlys - Dani  Phone number:  [phone]    Returned call at: _    Note:   Marlys called requesting prescription for patient's Oxycodone 5mg. Last filled 5-19-20 30 tabs w/0 refills.  Please advise.     Last office visit and reason:  3-18-20 Dani Rounds w/TFS---------------------  From: Flavia Rodriguez (TFS Message Pool (32224_[facility]))   To: Stanford Gayle MD;     Sent: 6/23/2020 5:02:59 PM CDT  Subject: FW: Phone Message

## 2022-02-16 NOTE — TELEPHONE ENCOUNTER
---------------------  From: Renetta Kraus CMA (Phone Messages Pool ([id number]_[facility]))   To: Advanced Practice Provider Pool (70924_[facility]);     Sent: 10/7/2021 3:38:18 PM CDT  Subject: Dani call - oxycodone refill     Medication Refill needing approval    PCP:   TITO OOC - per Dani needs today - has 4 pills left    Medication:   oxycodone 5mg  Last Filled:  8/3/21    Quantity:  30  Refills:  0  CSA on file?   no    Date of last office visit and reason:   9/10/21 jannie lau TFS    Return to Clinic order placed?  no    Note: Send to [facility]    Resource:   [facility]   Phone:   [phone]---------------------  From: Ac GARCÍA, Santos GRIFFITHS (Advanced Practice Provider Pool ([id number]_[facility]))   To: Phone Messages Pool (95224_[place] [facility] [place]);     Sent: 10/7/2021 3:52:02 PM CDT  Subject: RE: Dani call - oxycodone refill     Rx for #30 sent Penelope notified

## 2022-02-16 NOTE — PROGRESS NOTES
Patient:   NICK TRUONG            MRN: [id number]            FIN: [id number]               Age:   64 years     Sex:  Female     :  1955   Associated Diagnoses:   Chronic pain   Author:   Stanford Gayle MD      Chief Complaint      History of Present Illness   Neuropathic pain   right sided continues   On gabapentin   No other new sxs  Both upper and lower extremity complaints  cymbalta as well         Review of Systems   Constitutional:  Negative except as documented in history of present illness.    Respiratory:  Negative.    Cardiovascular:  Negative.    Gastrointestinal:  Negative.    Genitourinary:  Negative.    Musculoskeletal:  Negative except as documented in history of present illness.    Integumentary:  Negative.    Neurologic:  Negative except as documented in history of present illness.              Health Status   Allergies:    Allergic Reactions (Selected)  Severity Not Documented  TraZODone (Anxiety)   Medications:  (Selected)   Prescriptions  Prescribed  Cymbalta 30 mg oral delayed release capsule: = 2 cap(s) ( 60 mg ), po, bid, # 360 cap(s), 3 Refill(s), Type: Maintenance  clonazePAM 0.125 mg oral tablet, disintegratin tab(s) ( 0.125 mg ), Oral, bid, # 60 tab(s), 0 Refill(s), Type: Maintenance  oxyCODONE 5 mg oral capsule: 1 cap(s) ( 5 mg ), po, q12 hrs, # 30 cap(s), 0 Refill(s), Type: Maintenance  Documented Medications  Documented  Dulcolax Laxative: = 2 tab(s), po, daily, 0 Refill(s), Type: Maintenance  Incruse Ellipta 62.5 mcg/inh inhalation powder: ( 62.5 mcg ), inh, q 24 hrs, 0 Refill(s), Type: Maintenance  MiraLax oral powder for reconstitution: ( 17 gm ), Oral, every other day, 0 Refill(s), Type: Maintenance  RisperDAL 1 mg oral tablet: = 1 tab(s) ( 1 mg ), Oral, daily, Instructions: 1mg BID and 0.5mg at HS, 0 Refill(s), Type: Maintenance  Ventolin HFA 90 mcg/inh inhalation aerosol: 2 puff(s), inh, q4 hrs, PRN: as needed for wheezing, 0 Refill(s), Type:  Maintenance  acetaminophen 500 mg oral tablet: = 2 tab(s) ( 1,000 mg ), Oral, bid, Instructions: and 2 tabs prn q 6 hours for pain 6-10, 1 tab prn  for pain 1-5, PRN: as needed for pain, 0 Refill(s), Type: Maintenance  aspirin 81 mg oral tablet: 1 tab(s) ( 81 mg ), po, hs, 0 Refill(s), Type: Maintenance  gabapentin 300 mg oral capsule: = 3 cap(s) ( 900 mg ), Oral, hs, Instructions: takes 600mg Qam and 600mg at lunch and 900mg at HS, 0 Refill(s), Type: Maintenance  lidocaine 5% topical film: See Instructions, Instructions: 1 patch(es) to painful area of skin for up to 12hrs within a 24hr period, 0 Refill(s), Type: Maintenance  oxyCODONE 5 mg oral tablet: See Instructions, Instructions: Q8 HRS PRN (also has BID scheduled dose), 0 Refill(s), Type: Maintenance,    Medications          *denotes recorded medication          Cymbalta 30 mg oral delayed release capsule: 60 mg, 2 cap(s), po, bid, 360 cap(s), 3 Refill(s).          *acetaminophen 500 mg oral tablet: 1,000 mg, 2 tab(s), Oral, bid, and 2 tabs prn q 6 hours for pain 6-10, 1 tab prn  for pain 1-5, PRN: as needed for pain, 0 Refill(s).          *Ventolin HFA 90 mcg/inh inhalation aerosol: 2 puff(s), inh, q4 hrs, PRN: as needed for wheezing, 0 Refill(s).          *aspirin 81 mg oral tablet: 81 mg, 1 tab(s), po, hs, 0 Refill(s).          *Dulcolax Laxative: 2 tab(s), po, daily, 0 Refill(s).          clonazePAM 0.125 mg oral tablet, disintegratin.125 mg, 1 tab(s), Oral, bid, 60 tab(s), 0 Refill(s).          *gabapentin 300 mg oral capsule: 900 mg, 3 cap(s), Oral, hs, takes 600mg Qam and 600mg at lunch and 900mg at HS, 0 Refill(s).          *lidocaine 5% topical film: See Instructions, 1 patch(es) to painful area of skin for up to 12hrs within a 24hr period, 0 Refill(s).          oxyCODONE 5 mg oral capsule: 5 mg, 1 cap(s), po, q12 hrs, 30 cap(s), 0 Refill(s).          *oxyCODONE 5 mg oral tablet: See Instructions, Q8 HRS PRN (also has BID scheduled dose), 0  Refill(s).          *MiraLax oral powder for reconstitution: 17 gm, Oral, every other day, 0 Refill(s).          *RisperDAL 1 mg oral tablet: 1 mg, 1 tab(s), Oral, daily, 1mg BID and 0.5mg at HS, 0 Refill(s).          *Incruse Ellipta 62.5 mcg/inh inhalation powder: 62.5 mcg, inh, q 24 hrs, 0 Refill(s).       Problem list:    All Problems  Alcoholic hepatitis / SNOMED CT 125378787 / Confirmed  Anemia / SNOMED CT 812362053 / Confirmed  Anxiety / SNOMED CT 3035550891 / Confirmed  Dysphagia / SNOMED CT 419786352 / Confirmed  Cerebral edema / SNOMED CT 9508718 / Confirmed  COPD (chronic obstructive pulmonary disease) / SNOMED CT 93694060 / Confirmed  Chronic pain / SNOMED CT 971508875 / Confirmed  CVA (cerebrovascular accident) / SNOMED CT 265546616 / Confirmed  Diabetes mellitus / SNOMED CT 890678961 / Confirmed  Left foot drop / SNOMED CT 22545494 / Confirmed  Headache / SNOMED CT 7025957680 / Confirmed  Tachycardia / SNOMED CT 5926149036 / Confirmed  Hyperglycemia / SNOMED CT 976850957 / Confirmed  Cognitive impairment / SNOMED CT 8196005328 / Confirmed  Left hemiparesis / SNOMED CT 513339148 / Confirmed  Left homonymous hemianopsia / SNOMED CT 43444679 / Confirmed  Hypotension / SNOMED CT 165941409 / Confirmed  Major depressive disorder / SNOMED CT 0156321874 / Confirmed  Meralgia paraesthetica / SNOMED CT 927400375 / Confirmed  Neuropathic pain / SNOMED CT 493607152 / Confirmed  Neuropathy / SNOMED CT 2899202845 / Confirmed  Urinary retention / SNOMED CT 1837545694 / Confirmed  Thrombocytopenia / SNOMED CT 4722235281 / Confirmed  Tobacco user / SNOMED CT 714589368 / Probable  Vitamin D deficiency / SNOMED CT 39275492 / Confirmed  Vocal cord paralysis / SNOMED CT 747354780 / Confirmed  Inactive: Seizure / SNOMED CT 452318639  Resolved: Cervical cancer / SNOMED CT 958938298      Histories   Past Medical History:    Active  Alcoholic hepatitis (607420598): Onset in 2016 at 61 years.  CVA (cerebrovascular accident)  (614470235): Onset on 4/28/2015 at 59 years.  Left foot drop (00355784): Onset on 4/20/2015 at 59 years.  Neuropathy (3333226842): Onset on 3/20/2015 at 59 years.  Comments:  11/30/2016 CST 3:50 PM CST - Kitty Shook  Pain of lower extremity.  Urinary retention (5298264010): Onset on 3/12/2015 at 59 years.  Meralgia paraesthetica (341259017): Onset on 6/21/2014 at 59 years.  Chronic pain (868816693): Onset on 5/27/2014 at 58 years.  Cerebral edema (3005451): Onset on 1/10/2014 at 58 years.  Headache (3419068522): Onset on 1/10/2014 at 58 years.  Tachycardia (9692318088): Onset on 8/6/2012 at 57 years.  Cognitive impairment (0834573746): Onset on 10/17/2010 at 55 years.  Left homonymous hemianopsia (17519188): Onset on 10/17/2010 at 55 years.  Dysphagia (114156189): Onset on 10/17/2010 at 55 years.  Vitamin D deficiency (38571037): Onset on 10/14/2010 at 55 years.  Vocal cord paralysis (223270409): Onset on 10/12/2010 at 55 years.  Hyperglycemia (997112517): Onset on 9/30/2010 at 55 years.  Major depressive disorder (1496073738): Onset on 10/11/2007 at 52 years.  Left hemiparesis (634105487)  Hypotension (117180657)  Thrombocytopenia (9377647996)  Anemia (906976620)  COPD (chronic obstructive pulmonary disease) (49545311)  Diabetes mellitus (298234437)  Comments:  4/13/2018 CDT 10:19 AM CDT - Scarlett Isaac  BP Goal: <130/80  Anxiety (4360729363)  Neuropathic pain (767438998)  Resolved  Cervical cancer (040412332): Onset on 8/3/2015 at 60 years.  Resolved.   Family History:    Diabetes mellitus  Grandmother (M)  Grandfather (M)  Aunt (M)  Breast cancer  Mother ([place]): onset at 40 .  Aunt (M)  Sister: onset at 30 .  Heart disease  Grandfather (M)     Procedure history:    [facility] - Total abdominal hysterectomy and bilateral salpingo-oophorectomy (SNOMED CT 8658595950) on 8/6/2012 at 57 Years.  Radiation (SNOMED CT 113160399) in 2011 at 56 Years.  Comments:  1/14/2019 10:53 AM Kitty Hankins  Radiation  treatment for cervical cancer.   delivery (SNOMED CT 5679816377) in  at 26 Years.  Hysteroscopy (SNOMED CT 814543960).   Social History:        Tobacco Assessment: Current            Current every day smoker, Cigarettes, Total pack years: 20.                     Comments:                      2016 - Kitty Shook                     1/2 pack per day.      Substance Abuse Assessment: Denies Substance Abuse      Employment and Education Assessment            Disability      Home and Environment Assessment            Marital status: .        Physical Examination   VS/Measurements   General:  Alert and oriented, No acute distress.    Neck:  Supple.    Respiratory:  Respirations are non-labored.    Cardiovascular:  Normal rate, Regular rhythm.    Musculoskeletal:  No tenderness, No swelling.    Integumentary:  No rash.    Neurologic:  Alert, Oriented, right sided hemiplegia.       Impression and Plan   Diagnosis     Chronic pain (LAG75-KM G89.29).     Course:  Unchanged.    Plan:  no chenge.    Patient Instructions:       Counseled: Patient, Regarding diagnosis, Regarding treatment, Regarding medications.

## 2022-02-16 NOTE — NURSING NOTE
Comprehensive Intake Entered On:  6/17/2019 2:17 PM CDT    Performed On:  6/17/2019 2:10 PM CDT by Ene Chairez CMA               Summary   Chief Complaint :   c/o vomiting mucous and food , crackes in chest   Systolic Blood Pressure :   112 mmHg   Diastolic Blood Pressure :   64 mmHg   Mean Arterial Pressure :   80 mmHg   Peripheral Pulse Rate :   72 bpm   Temperature Tympanic :   97.8 DegF(Converted to: 36.6 DegC)  (LOW)    Contreras RICH Ene - 6/17/2019 2:10 PM CDT   Health Status   Allergies Verified? :   Yes   Medication History Verified? :   Yes   Pre-Visit Planning Status :   N/A   Tobacco Use? :   Never smoker   Ene Chairez CMA - 6/17/2019 2:10 PM CDT   Consents   Consent for Immunization Exchange :   Consent Granted   Consent for Immunizations to Providers :   Consent Granted   Ene Chairez CMA - 6/17/2019 2:10 PM CDT   Meds / Allergies   (As Of: 6/17/2019 2:17:40 PM CDT)   Allergies (Active)   traZODone  Estimated Onset Date:   Unspecified ; Reactions:   anxiety ; Created By:   Kitty Shook; Reaction Status:   Active ; Category:   Drug ; Substance:   traZODone ; Type:   Allergy ; Updated By:   Kitty Shook; Reviewed Date:   8/13/2018 10:45 AM CDT        Medication List   (As Of: 6/17/2019 2:17:40 PM CDT)   Prescription/Discharge Order    clonazePAM  :   clonazePAM ; Status:   Prescribed ; Ordered As Mnemonic:   clonazePAM 0.125 mg oral tablet, disintegrating ; Simple Display Line:   0.125 mg, 1 tab(s), Oral, bid, 60 tab(s), 0 Refill(s) ; Ordering Provider:   Isabelle López PA-C; Catalog Code:   clonazePAM ; Order Dt/Tm:   8/13/2018 5:27:59 PM          DULoxetine  :   DULoxetine ; Status:   Prescribed ; Ordered As Mnemonic:   Cymbalta 30 mg oral delayed release capsule ; Simple Display Line:   30 mg, 1 cap(s), po, bid, 30 cap(s), 0 Refill(s) ; Ordering Provider:   Stanford Gayle MD; Catalog Code:   DULoxetine ; Order Dt/Tm:   2/21/2018 3:46:02 PM           oxyCODONE  :   oxyCODONE ; Status:   Prescribed ; Ordered As Mnemonic:   oxyCODONE 5 mg oral capsule ; Simple Display Line:   5 mg, 1 cap(s), po, q12 hrs, 30 cap(s), 0 Refill(s) ; Ordering Provider:   Stanford Gayle MD; Catalog Code:   oxyCODONE ; Order Dt/Tm:   1/5/2018 4:38:44 PM            Home Meds    acetaminophen  :   acetaminophen ; Status:   Documented ; Ordered As Mnemonic:   acetaminophen 500 mg oral tablet ; Simple Display Line:   1,000 mg, 2 tab(s), Oral, bid, 2 tabs prn q 6 hours for pain 6-10  1 tab prn  for pain 1-5, PRN: as needed for pain, 0 Refill(s) ; Catalog Code:   acetaminophen ; Order Dt/Tm:   4/5/2019 1:28:13 PM          albuterol  :   albuterol ; Status:   Documented ; Ordered As Mnemonic:   Ventolin HFA 90 mcg/inh inhalation aerosol ; Simple Display Line:   2 puff(s), inh, q4 hrs, PRN: as needed for wheezing, 0 Refill(s) ; Catalog Code:   albuterol ; Order Dt/Tm:   12/2/2016 8:45:23 AM          aspirin  :   aspirin ; Status:   Documented ; Ordered As Mnemonic:   aspirin 81 mg oral tablet ; Simple Display Line:   81 mg, 1 tab(s), po, hs, 0 Refill(s) ; Catalog Code:   aspirin ; Order Dt/Tm:   12/2/2016 8:48:45 AM          bacillus coagulans-inulin  :   bacillus coagulans-inulin ; Status:   Documented ; Ordered As Mnemonic:   Probiotic Formula (Bacillus Coagulans) oral capsule ; Simple Display Line:   1 cap(s), Oral, daily, 0 Refill(s) ; Catalog Code:   bacillus coagulans-inulin ; Order Dt/Tm:   4/19/2019 3:41:18 PM          bisacodyl  :   bisacodyl ; Status:   Documented ; Ordered As Mnemonic:   Dulcolax Laxative ; Simple Display Line:   2 tab(s), po, daily, 0 Refill(s) ; Catalog Code:   bisacodyl ; Order Dt/Tm:   9/13/2017 10:28:56 AM          cholecalciferol  :   cholecalciferol ; Status:   Documented ; Ordered As Mnemonic:   cholecalciferol 1000 intl units oral capsule ; Simple Display Line:   See Instructions, 1 cap(s) po daily, 0 Refill(s) ; Catalog Code:   cholecalciferol ; Order  Dt/Tm:   12/2/2016 8:51:19 AM          gabapentin  :   gabapentin ; Status:   Documented ; Ordered As Mnemonic:   gabapentin 300 mg oral capsule ; Simple Display Line:   900 mg, 3 cap(s), Oral, hs, takes 600mg Qam and 600mg at lunch and 900mg at HS, 0 Refill(s) ; Catalog Code:   gabapentin ; Order Dt/Tm:   4/5/2019 1:22:36 PM          lidocaine topical  :   lidocaine topical ; Status:   Documented ; Ordered As Mnemonic:   lidocaine 5% topical film ; Simple Display Line:   See Instructions, 1 patch(es) to painful area of skin for up to 12hrs within a 24hr period, 0 Refill(s) ; Catalog Code:   lidocaine topical ; Order Dt/Tm:   12/2/2016 8:57:37 AM          multivitamin with minerals  :   multivitamin with minerals ; Status:   Documented ; Ordered As Mnemonic:   Multi-Day Plus Minerals ; Simple Display Line:   Oral, daily, 0 Refill(s) ; Catalog Code:   multivitamin with minerals ; Order Dt/Tm:   4/19/2019 3:41:40 PM          omega-3 polyunsaturated fatty acids  :   omega-3 polyunsaturated fatty acids ; Status:   Documented ; Ordered As Mnemonic:   omega-3 polyunsaturated fatty acids 1000 mg oral capsule ; Simple Display Line:   1,000 mg, 1 cap(s), po, tid, 0 Refill(s) ; Catalog Code:   omega-3 polyunsaturated fatty acids ; Order Dt/Tm:   12/2/2016 9:01:33 AM          polyethylene glycol 3350  :   polyethylene glycol 3350 ; Status:   Documented ; Ordered As Mnemonic:   MiraLax oral powder for reconstitution ; Simple Display Line:   17 gm, Oral, every other day, 0 Refill(s) ; Catalog Code:   polyethylene glycol 3350 ; Order Dt/Tm:   4/5/2019 1:29:34 PM          risperiDONE  :   risperiDONE ; Status:   Documented ; Ordered As Mnemonic:   RisperDAL 1 mg oral tablet ; Simple Display Line:   1 mg, 1 tab(s), Oral, daily, 1mg BID and 0.5mg at HS, 0 Refill(s) ; Catalog Code:   risperiDONE ; Order Dt/Tm:   12/2/2016 9:04:15 AM          umeclidinium  :   umeclidinium ; Status:   Documented ; Ordered As Mnemonic:   Incruse  Ellipta 62.5 mcg/inh inhalation powder ; Simple Display Line:   62.5 mcg, inh, q 24 hrs, 0 Refill(s) ; Catalog Code:   umeclidinium ; Order Dt/Tm:   9/8/2017 10:22:57 AM

## 2022-02-16 NOTE — PROGRESS NOTES
Chief Complaint    Pt c/o trouble breathing at night. Pt is falling asleep med sentence.  History of Present Illness      Chief complaint as above reviewed and confirmed with patient.  Pt presents to the clinic with concerns re: sedation.  She is brought to the clinic by [facility] staff.  Documentation relays pt has been very drowsy the last 2 days, falling asleep mid sentence.  Last night during the night was having a hard time keeping her sats about 90% and had some episodes of apnea but would wake up and start breathing again.  She reports she just cant seem to keep her eyes open and is very fatigued.  She has been started on Methadone 5 mg bid in the last 1 week and taken off her Oxycontin.  She still has oxycodone available for acute needs and is getting that about once or twice per day per nursing documentation for pain scale in the 7 range.  She has known L sided hemiparesis.  Pt denies any confusion or disorientation. she denies any weakness that is new or different for her.  no nausea no abdominal pain.  denies changes in urine but does have some incontinence which is unchanged. no changes of her vitals at the [facility] other than sats low through the night.  no recent cough or cold.  she states she other wise feels well other than her chronic pain and feeling so tired.  Review of Systems      Review of systems is negative with the exception of those noted in HPI          Physical Exam   Vitals & Measurements    T: 96.6   F (Tympanic)  HR: 71(Peripheral)  BP: 137/86                Vitals as above per nursing documentation           Constitutional : nad appears well          Ears: ears patent B, TMS intact, noninjected           Nose: nasal mucosa is non-ededmatous. no discharge           Throat: pharynx is nonerythematous, no tonsillar hypertrophy, no exudate           Neck: neck supple, no adenopathy, no thyromegaly, no rigidity           Lungs: lungs CTA', no Wheezes, rhonchi or rales           Heart: heart  RRR, nl S1, S2 no murmur           skin:  No rashes        General:  Alert and oriented, No acute distress.        Eye:  Pupils are equal, round and reactive to light, Extraocular movements are intact, Normal conjunctiva. no obvious facial drooping present         Respiratory:  Respirations are non-labored.        Cardiovascular:  Normal rate, Regular rhythm.        Neurologic:  Alert, Oriented, answers questions appropriately including year, month, president, good historian, knows her medical history and able to discuss her meds.  But when not stimulated pt closes eyes and falls asleep.  She is easily arroused and answers questions appropriately again.  Hemiparesis on the L with L AFO inplace and pt unable to extend against gravity at the knee.  LUE weakness as well, consistent with previous documentation.    No focal deficits, Cranial Nerves II-XII are grossly intact,        Psychiatric:  Cooperative, Appropriate mood & affect, Normal judgment.       labs: CBC, BMP and UA WNL      CXR: no acute findings.        After lab pt returned less drowsy compared to 1 hour ago, stated she was feeling better.  more animated and able to keep eyes open longer periods.          Assessment/Plan       Drowsiness (R40.0), Drowsiness (R40.0)        I suspect pt sx are secondary to oversedation from home meds.  She has newly started the methadone. All others are unchanged.  At night having apnea probably due to sedating meds including the clonazepam, methadone, oxycodone and gabapentin as well as resperidal.  Will start by decreasing the methadone to 3 mg Q 12 hours and decrease her clonazepam in half to 0.125 mg.  Instructed to have Narcan available for respiratory arrest.  continue to monitor closely.  She has follow up with her Pain management team in [place] Wednesday.  Instructed nursing staff to make sure the provider gets updated list of meds for the changes we made today.  Instructions given to avoid giving the oxycodone  prn only for other signs of acute pain other than just pain scale, including BP and HR elevations and pain behaviors.         Discussed with nursing staff at 1700 and pt was alert and back to herself and interactive without any sedation.  May need to consider decreasing other sedating medications if oversedation continues.         Orders:          Basic Metabolic Panel (Request), Priority: Urgent, Drowsiness          CBC w/ Diff/Plt (Request), Priority: Urgent, Drowsiness          Culture, Urine, Routine* ([facility]), Specimen Type: Urine (Clean Catch), Collection Date: 08/13/18 11:25:00 CDT          POC URINALYSIS, UA* ([facility]), Specimen Type: Urine, Collection Date: 08/13/18 11:44:00 CDT          POC URINALYSIS, UA* ([facility]), Specimen Type: Urine, Collection Date: 08/13/18 11:21:00 CDT          XR Chest PA/Lat (Request), Drowsiness                Orders:         methadone, 3 mL ( 3 mg ), PO, 12x/day, PRN: for pain, # 180 mL, 0 Refill(s), Type: Maintenance, (Ordered)  Patient Information     Name:NICK TRUONG      Address:      [address] [place] [id number]     Sex:Female     YOB: 1955     [phone]     Emergency Contact:JASPAL TRUONG     MRN:[id number]     FIN:[id number]     Location:[facility]     Date of Service:08/13/2018      Primary Care Physician:       Stanford Gayle MD, [phone]      Attending Physician:       Isabelle López PA-C, [phone]  Problem List/Past Medical History    Ongoing     Alcoholic hepatitis     Anemia     Anxiety     Cerebral edema     Cervical cancer     Chronic pain     Cognitive impairment     COPD (chronic obstructive pulmonary disease)     CVA (cerebrovascular accident)     Diabetes mellitus       Comments: BP Goal: <130/80     Dysphagia     Headache     Hyperglycemia     Hypotension     Left foot drop     Left hemiparesis     Left homonymous hemianopsia     Major depressive disorder     Meralgia  paraesthetica     Neuropathic pain     Neuropathy       Comments: Pain of lower extremity.     Seizure     Tachycardia     Thrombocytopenia     Tobacco user     Urinary retention     Vitamin D deficiency     Vocal cord paralysis    Historical     No qualifying data  Procedure/Surgical History     YOUSUF BSO - Total abdominal hysterectomy and bilateral salpingo-oophorectomy (2012)            delivery ()           Hysteroscopy        Medications     Tylenol 500 mg oral tablet: 500 mg, 1 tab(s), PO, q4hr, PRN: for pain, 0 Refill(s).     Ventolin HFA 90 mcg/inh inhalation aerosol: 2 puff(s), inh, q4 hrs, PRN: as needed for wheezing, 0 Refill(s).     aspirin 81 mg oral tablet: 81 mg, 1 tab(s), po, hs, 0 Refill(s).     bisacodyl: 5 mg, po, daily, 0 Refill(s).     cholecalciferol 1000 intl units oral capsule: See Instructions, 1 cap(s) po bid, 0 Refill(s).     clonazePAM 0.5 mg oral tablet: 0.25 mg, 0.5 tab(s), po, qam, and 1 tab(s) at hs, 0 Refill(s).     lidocaine 5% topical film: See Instructions, 1 patch(es) to painful area of skin for up to 12hrs within a 24hr period, 0 Refill(s).     omega-3 polyunsaturated fatty acids 1000 mg oral capsule: 1,000 mg, 1 cap(s), po, daily, 0 Refill(s).     RisperDAL 1 mg oral tablet: See Instructions, 2mg qam, 1mg in afternoon and 1mg at HS., 0 Refill(s).     Incruse Ellipta 62.5 mcg/inh inhalation powder: 62.5 mcg, inh, q 24 hrs, 0 Refill(s).     Dulcolax Laxative: 5 mg, po, daily, 0 Refill(s).     oxyCODONE 5 mg oral capsule: 5 mg, 1 cap(s), po, q12 hrs, 30 cap(s), 0 Refill(s).     gabapentin 600 mg oral tablet: 600 mg, 1 tab(s), PO, TID, 270 tab(s), 0 Refill(s).     Cymbalta 30 mg oral delayed release capsule: 30 mg, 1 cap(s), po, daily, 30 cap(s), 0 Refill(s).     methadone 5 mg oral tablet: 5 mg, 1 tab(s), PO, PRN: for pain, 0 Refill(s).     methadone 5 mg/5 mL oral solution: 3 mg, 3 mL, PO, 12x/day, PRN: for pain, 180 mL, 0 Refill(s).          Allergies     traZODone (anxiety)  Social History    Smoking Status - 08/13/2018     Never smoker     Home and Environment      Marital status: ., 11/30/2016     Substance Abuse - Denies Substance Abuse, 11/30/2016     Tobacco - Current, 11/30/2016      Current every day smoker, Cigarettes, Total pack years: 20., 11/30/2016  Family History    Breast cancer: Mother (Dx about 40), Sister (Dx about 30) and Aunt (M).    Diabetes mellitus: Aunt (M), Grandfather (M) and Grandmother (M).    Heart disease: Grandfather (M).  Immunizations      Vaccine Date Status      influenza virus vaccine, inactivated 11/15/2013 Recorded      pneumococcal (PPSV23) 07/19/2012 Recorded      Hep B 04/10/1992 Recorded      Hep B 11/21/1991 Recorded      Hep B 10/15/1991 Recorded  Lab Results       Lab Results (Last 4 results within 90 days)        Sodium Level: 141 [135 mmol/L - 145 mmol/L] (08/13/18 11:42:00 CDT)       Potassium Level: 4.4 [3.5 mmol/L - 5 mmol/L] (08/13/18 11:42:00 CDT)       Chloride Level: 105 [98 mmol/L - 110 mmol/L] (08/13/18 11:42:00 CDT)       CO2 Level: 29 [21 mmol/L - 31 mmol/L] (08/13/18 11:42:00 CDT)       AGAP: 7 [5  - 18] (08/13/18 11:42:00 CDT)       Glucose Level: 98 [65 mg/dL - 100 mg/dL] (08/13/18 11:42:00 CDT)       BUN: 11 [8 mg/dL - 25 mg/dL] (08/13/18 11:42:00 CDT)       Creatinine Level: 0.62 [0.57 mg/dL - 1.11 mg/dL] (08/13/18 11:42:00 CDT)       BUN/Creat Ratio: 18 [10  - 20] (08/13/18 11:42:00 CDT)       eGFR : >60 [10  - 20] (08/13/18 11:42:00 CDT)       eGFR Non-: >60 [10  - 20] (08/13/18 11:42:00 CDT)       Calcium Level: 10.3 [8.5 mg/dL - 10.5 mg/dL] (08/13/18 11:42:00 CDT)       WBC: 6.9 [4.5  - 11] (08/13/18 11:42:00 CDT)       RBC: 4.82 [4  - 5.2] (08/13/18 11:42:00 CDT)       Hgb: 14.1 [12 g/dL - 16 g/dL] (08/13/18 11:42:00 CDT)       Hct: 43.0 [33 % - 51 %] (08/13/18 11:42:00 CDT)       MCV: 89 [80 fL - 100 fL] (08/13/18 11:42:00 CDT)       MCH: 29.3 [26 pg - 34 pg]  (08/13/18 11:42:00 CDT)       MCHC: 32.8 [32 g/dL - 36 g/dL] (08/13/18 11:42:00 CDT)       RDW: 13.9 [11.5 % - 15.5 %] (08/13/18 11:42:00 CDT)       Platelet: 249 [140  - 440] (08/13/18 11:42:00 CDT)       MPV: 8.7 [6.5 fL - 11 fL] (08/13/18 11:42:00 CDT)       Lymphocytes: 30.3 [8.5 mg/dL - 10.5 mg/dL] (08/13/18 11:42:00 CDT)       Abs Lymphocytes: 2.1 [0.9  - 2.9] (08/13/18 11:42:00 CDT)       Neutrophils: 60.0 [0.9  - 2.9] (08/13/18 11:42:00 CDT)       Abs Neutrophils: 4.1 [1.7  - 7] (08/13/18 11:42:00 CDT)       Monocytes: 6.6 [8.5 mg/dL - 10.5 mg/dL] (08/13/18 11:42:00 CDT)       Abs Monocytes: 0.5 [0.9  - 2.9] (08/13/18 11:42:00 CDT)       Eosinophils: 2.5 [8.5 mg/dL - 10.5 mg/dL] (08/13/18 11:42:00 CDT)       Abs Eosinophils: 0.2 [0.9  - 2.9] (08/13/18 11:42:00 CDT)       Basophils: 0.6 [8.5 mg/dL - 10.5 mg/dL] (08/13/18 11:42:00 CDT)       Abs Basophils: 0.0 [0.9  - 2.9] (08/13/18 11:42:00 CDT)       UA pH: 7 [5  - 8] (08/13/18 11:49:00 CDT)       UA Specific Gravity: 1.015 [1.001  - 1.035] (08/13/18 11:49:00 CDT)       UA Glucose: NEGATIVE [0.57 mg/dL - 1.11 mg/dL] (08/13/18 11:49:00 CDT)       UA Bilirubin: NEGATIVE [0.57 mg/dL - 1.11 mg/dL] (08/13/18 11:49:00 CDT)       UA Ketones: NEGATIVE [0.57 mg/dL - 1.11 mg/dL] (08/13/18 11:49:00 CDT)       Urine Occult Blood: NEGATIVE [0.57 mg/dL - 1.11 mg/dL] (08/13/18 11:49:00 CDT)       UA Protein: NEGATIVE [0.57 mg/dL - 1.11 mg/dL] (08/13/18 11:49:00 CDT)       UA Nitrite: NEGATIVE [0.57 mg/dL - 1.11 mg/dL] (08/13/18 11:49:00 CDT)       UA Leukocyte Esterase: NEGATIVE [0.57 mg/dL - 1.11 mg/dL] (08/13/18 11:49:00 CDT)

## 2022-02-16 NOTE — TELEPHONE ENCOUNTER
---------------------  From: Zahira Ramires CMA (Phone Messages Pool ([id number]_[facility]))   To: [facility] Message Pool ([facility]);     Sent: 5/9/2019 8:30:57 AM CDT  Subject: Phone message     PCP:   TFS     Time of Call:  8:16am       Person Calling:  Tabatha- [facility] scheduling   Phone number:  [phone]    Returned call at: 8:25am    Note:  Tabatha calls stating they received a radiology order from [facility] on [address] through the [facility] yesterday, but need more details. Tabatha states that the diagnosis only states mass, they need it to state a location of the mass and symptoms the patient is having. Please bring new order over to radiology when completed.---------------------  From: Joanne Vieira ([facility] Message Pool ([id number]))   To: Evonne Khan;     Sent: 5/9/2019 3:15:29 PM CDT  Subject: FW: Phone message---------------------  From: Evonne Khan   To: [facility] Message Pool ([id number]);     Sent: 5/9/2019 3:54:25 PM CDT  Subject: RE: Phone message     I walked over to radiology and again filled out a ct order with the tech at my side to make sure it was completed as required to get the test done. I called the [facility] to let them known

## 2022-02-16 NOTE — TELEPHONE ENCOUNTER
---------------------  From: Chrystal Nichols CMA (Phone Messages Pool ([address]))   To: [facility] Message Pool ([address]);     Sent: 9/5/2019 4:24:46 PM CDT  Subject: General Message       Phone Message    PCP:    TFS      Time of Call:  2:09       Person Calling:  Carmen Mathew mother in law  Phone number:  [phone]    Time returned call:  4:11    Note:  Carmen called with question and comments.      She is wondering if there is a pain medication that hasn't been tired yet with a fast onset.  Maybe with a fast onset patient would be more willing to continue taking.    Carmen is bringing a new pad for patient to try for her comfort.    Regarding pain clinics.  Patient has been turned away from most pain clinics because of her history and discharge from [facility].  Carmen feels this may not be an option.      Transferred to: [facility] pool---------------------  From: Scarlett Isaac ([facility] Message Pool ([id number]_[facility]))   To: Stanford Gayle MD;     Sent: 9/7/2019 3:02:40 PM CDT  Subject: FW: General Message---------------------  From: Stanford Gayle MD   To: Scarlett Isaac;     Sent: 9/10/2019 9:19:31 AM CDT  Subject: RE: General Message     increase cymbalta to 60 mg bid (30mg 2 tabs bid)---------------------  From: Scarlett Isaac   To: [facility] Pool ([facility]);     Sent: 9/10/2019 9:20:46 AM CDT  Subject: FW: General Message---------------------  From: [place] CMA (Care Coordinators Pool ([facility]))   To: Sidra Venegas CMA;     Sent: 9/10/2019 10:16:00 AM CDT  Subject: FW: General MessageCalled and spoke to Carmen, let her know we were going to increase her Cymbalta.  She verbalized understanding and agreed.  She had no further questions or concerns.  Then printed new Rx and faxed it to the [facility] with new order.  Confirmation was recieved.    TFS verbally gave me orders to place a referral to [facility].  CC called and spoke to  them, they can only except [facility] patients at this time.  CC explained that we are now part of [place], she said that is great, we need to fax referral, last few provider notes, and they will start the process of enrolling her in the program if she qualifies.      This was done and confirmation was received.  Carmen was notified of this as well.  She agreed and had no further questions or concerns.    Mariola Venegas CMA.

## 2022-02-16 NOTE — NURSING NOTE
Comprehensive Intake Entered On:  9/4/2019 1:28 PM CDT    Performed On:  9/4/2019 1:27 PM CDT by Scarlett Isaac               Summary   Chief Complaint :   pain per kinni   Weight Measured :   166.5 lb(Converted to: 166 lb 8 oz, 75.52 kg)    Systolic Blood Pressure :   136 mmHg (HI)    Diastolic Blood Pressure :   83 mmHg (HI)    Mean Arterial Pressure :   101 mmHg   Peripheral Pulse Rate :   89 bpm   Vital Signs Comments :   weight per kinni 8/29/19   Temperature Tympanic :   97.9 DegF(Converted to: 36.6 DegC)    Scarlett Isaac - 9/4/2019 1:27 PM CDT

## 2022-02-16 NOTE — PROGRESS NOTES
"   Patient:   NICK TRUONG            MRN: [id number]            FIN: [id number]               Age:   62 years     Sex:  Female     :  1955   Associated Diagnoses:   Alcoholic hepatitis; Cognitive impairment; COPD (chronic obstructive pulmonary disease); CVA (cerebrovascular accident); Diabetes mellitus; Left hemiparesis; Left homonymous hemianopsia; Major depressive disorder   Author:   Stanford Gayle MD      History of Present Illness     \"The patient was admitted to the [facility] on 2016.  The patient is a 61-year-old female with a history of anxiety and cognitive impairment, past cervical cancer, meralgia paresthetica of the right side, left-sided weakness, past stroke, seizure disorder, and COPD.  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  She has also had an unintentional overdose of opiates.  She has chronic constipation.  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  She was admitted, it looks like, from [facility] where she had spent four days.  She was admitted there on  after being discharged from a TCU.  Apparently family was no longer able to provide the care she needed and  stated that she had exhausted her benefits in TCU and a long-term care facility would need to be sought, which is how she came here to the [facility]\"  No change since arrival  Still struggles with pain    2018 no change      Review of Systems             Health Status   Allergies:    Allergic Reactions (Selected)  Severity Not Documented  TraZODone (Anxiety)   Medications:  (Selected)   Prescriptions  Prescribed  oxyCODONE 5 mg oral capsule: 1 cap(s) ( 5 mg ), po, q12 hrs, # 30 cap(s), 0 Refill(s), Type: Maintenance  Documented Medications  Documented  Dulcolax Laxative: ( 5 mg ), po, daily, 0 Refill(s), Type: Maintenance  Incruse Ellipta 62.5 mcg/inh inhalation powder: ( 62.5 mcg ), inh, q 24 hrs, 0 Refill(s), Type: Maintenance  Milk of Magnesia: po, hs, " 0 Refill(s), Type: Maintenance  OxyCONTIN 10 mg oral tablet, extended release: 1 tab(s) ( 10 mg ), PO, q12hr, 0 Refill(s), Type: Maintenance  RisperDAL 1 mg oral tablet: 1 tab(s) ( 1 mg ), PO, BID, Instructions: 1 tablet po in the morning, 1 tablet at noon and 2 tablets at bedtime, # 60 tab(s), 0 Refill(s), Type: Maintenance  Tylenol 500 mg oral tablet: 1 tab(s) ( 500 mg ), PO, q4hr, PRN: for pain, 0 Refill(s), Type: Maintenance  Ventolin HFA 90 mcg/inh inhalation aerosol: 2 puff(s), inh, q4 hrs, PRN: as needed for wheezing, 0 Refill(s), Type: Maintenance  aspirin 81 mg oral tablet: 1 tab(s) ( 81 mg ), po, hs, 0 Refill(s), Type: Maintenance  bisacodyl: ( 5 mg ), po, daily, 0 Refill(s), Type: Maintenance  cholecalciferol 1000 intl units oral capsule: See Instructions, Instructions: 1 cap(s) po bid, 0 Refill(s), Type: Maintenance  clonazePAM 0.5 mg oral tablet: 0.5 tab(s) ( 0.25 mg ), po, qam, Instructions: and 1 tab(s) at hs, 0 Refill(s), Type: Maintenance  gabapentin 300 mg oral capsule: See Instructions, Instructions: 600-300-600, 0 Refill(s), Type: Maintenance  lidocaine 5% topical film: See Instructions, Instructions: 1 patch(es) to painful area of skin for up to 12hrs within a 24hr period, 0 Refill(s), Type: Maintenance  omega-3 polyunsaturated fatty acids 1000 mg oral capsule: 1 cap(s) ( 1,000 mg ), po, daily, 0 Refill(s), Type: Maintenance  venlafaxine 75 mg oral tablet: 2 tab(s) ( 150 mg ), po, daily, 0 Refill(s), Type: Maintenance,    Medications          *denotes recorded medication          *Tylenol 500 mg oral tablet: 500 mg, 1 tab(s), PO, q4hr, PRN: for pain, 0 Refill(s).          *Ventolin HFA 90 mcg/inh inhalation aerosol: 2 puff(s), inh, q4 hrs, PRN: as needed for wheezing, 0 Refill(s).          *aspirin 81 mg oral tablet: 81 mg, 1 tab(s), po, hs, 0 Refill(s).          *bisacodyl: 5 mg, po, daily, 0 Refill(s).          *Dulcolax Laxative: 5 mg, po, daily, 0 Refill(s).          *cholecalciferol 1000  intl units oral capsule: See Instructions, 1 cap(s) po bid, 0 Refill(s).          *clonazePAM 0.5 mg oral tablet: 0.25 mg, 0.5 tab(s), po, qam, and 1 tab(s) at hs, 0 Refill(s).          *gabapentin 300 mg oral capsule: See Instructions, 600-300-600, 0 Refill(s).          *lidocaine 5% topical film: See Instructions, 1 patch(es) to painful area of skin for up to 12hrs within a 24hr period, 0 Refill(s).          *Milk of Magnesia: po, hs, 0 Refill(s).          *omega-3 polyunsaturated fatty acids 1000 mg oral capsule: 1,000 mg, 1 cap(s), po, daily, 0 Refill(s).          *OxyCONTIN 10 mg oral tablet, extended release: 10 mg, 1 tab(s), PO, q12hr, 0 Refill(s).          oxyCODONE 5 mg oral capsule: 5 mg, 1 cap(s), po, q12 hrs, 30 cap(s), 0 Refill(s).          *RisperDAL 1 mg oral tablet: 1 mg, 1 tab(s), PO, BID, 1 tablet po in the morning, 1 tablet at noon and 2 tablets at bedtime, 60 tab(s), 0 Refill(s).          *Incruse Ellipta 62.5 mcg/inh inhalation powder: 62.5 mcg, inh, q 24 hrs, 0 Refill(s).          *venlafaxine 75 mg oral tablet: 150 mg, 2 tab(s), po, daily, 0 Refill(s).     Problem list:    All Problems  Alcoholic hepatitis / SNOMED CT 733726130 / Confirmed  Anemia / SNOMED CT 234239521 / Confirmed  Anxiety / SNOMED CT 4522263617 / Confirmed  Dysphagia / SNOMED CT 775087168 / Confirmed  Cerebral edema / SNOMED CT 2365782 / Confirmed  COPD (chronic obstructive pulmonary disease) / SNOMED CT 61142754 / Confirmed  Chronic pain / SNOMED CT 326549353 / Confirmed  CVA (cerebrovascular accident) / SNOMED CT 876825643 / Confirmed  Diabetes mellitus / SNOMED CT 908573026 / Confirmed  Left foot drop / SNOMED CT 84515201 / Confirmed  Headache / SNOMED CT 7616394850 / Confirmed  Tachycardia / SNOMED CT 1767945586 / Confirmed  Hyperglycemia / SNOMED CT 083526325 / Confirmed  Cognitive impairment / SNOMED CT 9964706135 / Confirmed  Left hemiparesis / SNOMED CT 750972858 / Confirmed  Left homonymous hemianopsia / SNOMED CT  17847749 / Confirmed  Hypotension / SNOMED CT 769457625 / Confirmed  Major depressive disorder / SNOMED CT 2032961368 / Confirmed  Cervical cancer / SNOMED CT 112018252 / Confirmed  Meralgia paraesthetica / SNOMED CT 257854742 / Confirmed  Neuropathic pain / SNOMED CT 613261958 / Confirmed  Neuropathy / SNOMED CT 1299965489 / Confirmed  Urinary retention / SNOMED CT 3105678949 / Confirmed  Thrombocytopenia / SNOMED CT 5720700489 / Confirmed  Seizure / SNOMED CT 257205154 / Confirmed  Tobacco user / SNOMED CT 395182523 / Probable  Vitamin D deficiency / SNOMED CT 72148266 / Confirmed  Vocal cord paralysis / SNOMED CT 178524840 / Confirmed      Histories   Past Medical History:    Active  Cervical cancer (043550906): Onset on 8/3/2015 at 60 years.  CVA (cerebrovascular accident) (033267946): Onset on 4/28/2015 at 59 years.  Left foot drop (87650048): Onset on 4/20/2015 at 59 years.  Neuropathy (0114247305): Onset on 3/20/2015 at 59 years.  Comments:  11/30/2016 CST 3:50 PM CST - Kitty Shook  Pain of lower extremity.  Urinary retention (7618846198): Onset on 3/12/2015 at 59 years.  Meralgia paraesthetica (012324154): Onset on 6/21/2014 at 59 years.  Chronic pain (664014647): Onset on 5/27/2014 at 58 years.  Cerebral edema (0447527): Onset on 1/10/2014 at 58 years.  Headache (8751727405): Onset on 1/10/2014 at 58 years.  Tachycardia (1556944363): Onset on 8/6/2012 at 57 years.  Seizure (667070886): Onset on 5/30/2011 at 56 years.  Cognitive impairment (1207939117): Onset on 10/17/2010 at 55 years.  Left homonymous hemianopsia (72621535): Onset on 10/17/2010 at 55 years.  Dysphagia (457612898): Onset on 10/17/2010 at 55 years.  Vitamin D deficiency (42306470): Onset on 10/14/2010 at 55 years.  Vocal cord paralysis (835029868): Onset on 10/12/2010 at 55 years.  Hyperglycemia (857940449): Onset on 9/30/2010 at 55 years.  Major depressive disorder (7937266087): Onset on 10/11/2007 at 52 years.  Left hemiparesis  (376142274)  Hypotension (120199820)  Thrombocytopenia (2182920012)  Anemia (384555121)  Alcoholic hepatitis (275130357)  COPD (chronic obstructive pulmonary disease) (05962663)  Diabetes mellitus (397175161)  Anxiety (8122522508)  Neuropathic pain (943691460)   Family History:    Diabetes mellitus  Grandmother (M)  Grandfather (M)  Aunt (M)  Breast cancer  Mother (Carmen): onset at 40 .  Aunt (M)  Sister: onset at 30 .  Heart disease  Grandfather (M)     Procedure history:    [facility] - Total abdominal hysterectomy and bilateral salpingo-oophorectomy (SNOMED CT 3644539460) on 2012 at 57 Years.   delivery (SNOMED CT 3799675229) in  at 26 Years.  Hysteroscopy (SNOMED CT 910155303).   Social History:        Tobacco Assessment: Current            Current every day smoker, Cigarettes, Total pack years: 20.                     Comments:                      2016 - Kitty Shook                     1/2 pack per day.      Substance Abuse Assessment: Denies Substance Abuse      Home and Environment Assessment            Marital status: .        Physical Examination   Neck:  Supple, Non-tender.    Respiratory:  Lungs are clear to auscultation, Respirations are non-labored.    Cardiovascular:  Normal rate, Regular rhythm.    Gastrointestinal:  Soft.    Neurologic:  Alert, left sided hemiparesis.       Impression and Plan   Diagnosis     Alcoholic hepatitis (HOQ00-RQ K70.10).     Cognitive impairment (PPM05-YT R41.89).     COPD (chronic obstructive pulmonary disease) (MEA72-SK J44.9).     CVA (cerebrovascular accident) (NLR38-SN I63.9).     Diabetes mellitus (ONY12-DQ E11.9).     Left hemiparesis (FNK46-RY G81.94).     Left homonymous hemianopsia (VWJ42-TX H53.462).     Major depressive disorder (MLF41-MC F32.9).     Course:  Progressing as expected.    Plan:  fu 2 month.

## 2022-02-16 NOTE — PROGRESS NOTES
Patient:   NICK TRUONG            MRN: [id number]            FIN: [id number]               Age:   66 years     Sex:  Female     :  1955   Associated Diagnoses:   Cognitive impairment; Left hemiparesis; Major depressive disorder   Author:   Stanford Gayle MD      Visit Information      Date of Service: 2021 10:08 am  Performing Location: [facility]  Encounter#: [id number]      Primary Care Provider (PCP):  Stanford Gayle MD    NPI# [id number]      Referring Provider:  Stanford Gayle MD    NPI# [id number]      History of Present Illness   Asked to see patient at the nursing home regarding possible decrease in her risperidone         Health Status   Allergies:    Allergic Reactions (Selected)  Severity Not Documented  TraZODone (Anxiety)   Medications:  (Selected)   Prescriptions  Prescribed  Cymbalta 30 mg oral delayed release capsule: = 2 cap(s) ( 60 mg ), po, bid, # 360 cap(s), 3 Refill(s), Type: Maintenance  Luminas Pain Relief Patch: Luminas Pain Relief Patch, See Instructions, Instructions: Apply 1 patch every 24 hours, Supply, # 1 box(es), 11 Refill(s), Type: Maintenance  clonazePAM 0.125 mg oral tablet, disintegratin tab(s) ( 0.125 mg ), Oral, bid, # 60 tab(s), 0 Refill(s), Type: Maintenance  lidocaine 5% topical film: 1 patch(es), Topical, daily, # 30 patch(es), 11 Refill(s), Type: Maintenance, Pharmacy: [facility], 1 patch(es) Topical daily, 166.5, lb, 19 13:27:00 CDT, Weight Measured  oxyCODONE 5 mg oral tablet: = 1 tab(s) ( 5 mg ), Oral, hs, PRN: as needed for pain, # 30 tab(s), 0 Refill(s), Type: Maintenance, Pharmacy: [facility], 1 tab(s) Oral hs,PRN:as needed for pain  Documented Medications  Documented  Dulcolax Laxative: = 2 tab(s), po, daily, 0 Refill(s), Type: Maintenance  Incruse Ellipta 62.5 mcg/inh inhalation powder: ( 62.5 mcg ), inh, q 24 hrs, 0 Refill(s), Type: Maintenance  MiraLax oral powder for reconstitution: (  17 gm ), Oral, every other day, 0 Refill(s), Type: Maintenance  RisperDAL 1 mg oral tablet: = 1 tab(s) ( 1 mg ), Oral, daily, Instructions: 1mg BID and 0.5mg at HS, 0 Refill(s), Type: Maintenance  Ventolin HFA 90 mcg/inh inhalation aerosol: 2 puff(s), inh, q4 hrs, PRN: as needed for wheezing, 0 Refill(s), Type: Maintenance  acetaminophen 500 mg oral tablet: = 2 tab(s) ( 1,000 mg ), Oral, bid, Instructions: and 2 tabs prn q 6 hours for pain 6-10, 1 tab prn  for pain 1-5, PRN: as needed for pain, 0 Refill(s), Type: Maintenance  aspirin 81 mg oral tablet: 1 tab(s) ( 81 mg ), po, hs, 0 Refill(s), Type: Maintenance  gabapentin 300 mg oral capsule: = 3 cap(s) ( 900 mg ), Oral, hs, Instructions: takes 600mg Qam and 600mg at lunch and 900mg at HS, 0 Refill(s), Type: Maintenance   Problem list:    All Problems (Selected)  Left foot drop / SNOMED CT 30335415 / Confirmed  Major depressive disorder / SNOMED CT 5237378493 / Confirmed  Diabetes mellitus / SNOMED CT 560819935 / Confirmed  Headache / SNOMED CT 9501040424 / Confirmed  Tachycardia / SNOMED CT 6828217447 / Confirmed  Hyperglycemia / SNOMED CT 622378242 / Confirmed  Chronic pain / SNOMED CT 623158955 / Confirmed  Meralgia paraesthetica / SNOMED CT 598735383 / Confirmed  Neuropathy / SNOMED CT 9810129055 / Confirmed  Cognitive impairment / SNOMED CT 7421550607 / Confirmed  Tobacco user / SNOMED CT 918882870 / Probable  Urinary retention / SNOMED CT 8598095054 / Confirmed  COPD (chronic obstructive pulmonary disease) / SNOMED CT 04950561 / Confirmed  Thrombocytopenia / SNOMED CT 1910029292 / Confirmed  Anxiety / SNOMED CT 2103851732 / Confirmed  CVA (cerebrovascular accident) / SNOMED CT 689372069 / Confirmed  Alcoholic hepatitis / SNOMED CT 375487194 / Confirmed  Neuropathic pain / SNOMED CT 387770578 / Confirmed  Anemia / SNOMED CT 927519663 / Confirmed  Left hemiparesis / SNOMED CT 760961984 / Confirmed  Vocal cord paralysis / SNOMED CT 094086691 / Confirmed  Cerebral  edema / SNOMED CT 5719771 / Confirmed  Dysphagia / SNOMED CT 773615336 / Confirmed  Hypotension / SNOMED CT 315398561 / Confirmed  Vitamin D deficiency / SNOMED CT 02083469 / Confirmed  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  CVA (cerebrovascular accident) (588552936): Onset on 4/28/2015 at 59 years.  Left foot drop (08299157): Onset on 4/20/2015 at 59 years.  Neuropathy (2487202967): Onset on 3/20/2015 at 59 years.  Comments:  11/30/2016 CST 3:50 PM CST - Kitty Shook  Pain of lower extremity.  Urinary retention (9719957072): Onset on 3/12/2015 at 59 years.  Meralgia paraesthetica (905752723): Onset on 6/21/2014 at 59 years.  Chronic pain (364106226): Onset on 5/27/2014 at 58 years.  Cerebral edema (0993351): Onset on 1/10/2014 at 58 years.  Headache (9186290689): Onset on 1/10/2014 at 58 years.  Tachycardia (3704447809): Onset on 8/6/2012 at 57 years.  Cognitive impairment (3641401331): Onset on 10/17/2010 at 55 years.  Left homonymous hemianopsia (29184565): Onset on 10/17/2010 at 55 years.  Dysphagia (654184266): Onset on 10/17/2010 at 55 years.  Vitamin D deficiency (25625850): Onset on 10/14/2010 at 55 years.  Vocal cord paralysis (402909684): Onset on 10/12/2010 at 55 years.  Hyperglycemia (772502439): Onset on 9/30/2010 at 55 years.  Major depressive disorder (7410861992): Onset on 10/11/2007 at 52 years.  Left hemiparesis (708367664)  Hypotension (041575237)  Thrombocytopenia (2503800016)  Anemia (397182663)  COPD (chronic obstructive pulmonary disease) (33529311)  Diabetes mellitus (440302851)  Comments:  4/13/2018 CDT 10:19 AM CDT - Scarlett Isaac  BP Goal: <130/80  Anxiety (1591325600)  Neuropathic pain (711464494)  Resolved  Cervical cancer (060614799): Onset on 8/3/2015 at 60 years.  Resolved.   Family  History:    Diabetes mellitus  Grandmother (M)  Grandfather (M)  Aunt (M)  Breast cancer  Mother (Carmen): onset at 40 .  Aunt (M)  Sister: onset at 30 .  Heart disease  Grandfather (M)     Procedure history:    [facility] - Total abdominal hysterectomy and bilateral salpingo-oophorectomy (SNOMED CT 4635088297) on 2012 at 57 Years.  Radiation (SNOMED CT 647238360) in  at 56 Years.  Comments:  2019 10:53 AM CST - Kitty Shook  Radiation treatment for cervical cancer.   delivery (SNOMED CT 4492039740) in  at 26 Years.  Hysteroscopy (SNOMED CT 290923480).   Social History:        Tobacco Assessment: Current            Current every day smoker, Cigarettes, Total pack years: 20.                     Comments:                      2016 - Kitty Shook                     1/2 pack per day.      Substance Abuse Assessment: Denies Substance Abuse      Employment and Education Assessment            Disability      Home and Environment Assessment            Marital status: .        Physical Examination   Documented vital signs:  Within normal limits.    Neurologic:  Alert.    Psychiatric:  Cooperative, Appropriate mood & affect.       Impression and Plan   Diagnosis     Cognitive impairment (NAD35-UU R41.89).     Left hemiparesis (IQO28-BZ G81.94).     Major depressive disorder (PNZ12-DV F32.9).     Plan:  Patient with history of CVA with hemiplegia depression and anxiety and mood disorder we will try taper of her risperidone  .

## 2022-02-16 NOTE — NURSING NOTE
Comprehensive Intake Entered On:  4/5/2019 1:34 PM CDT    Performed On:  4/5/2019 1:31 PM CDT by Scarlett Isaac               Summary   Chief Complaint :   pain management   Weight Measured :   160.0 lb(Converted to: 160 lb 0 oz, 72.57 kg)    Systolic Blood Pressure :   122 mmHg   Diastolic Blood Pressure :   84 mmHg (HI)    Mean Arterial Pressure :   97 mmHg   Peripheral Pulse Rate :   102 bpm (HI)    Vital Signs Comments :   weight per kinni   BP Method :   Electronic   HR Method :   Electronic   Temperature Tympanic :   97.3 DegF(Converted to: 36.3 DegC)  (LOW)    Scarlett Isaac - 4/5/2019 1:31 PM CDT   Health Status   Allergies Verified? :   Yes   Scarlett Isaac - 4/5/2019 1:31 PM CDT

## 2022-02-16 NOTE — PROGRESS NOTES
"   Patient:   NICK TRUONG            MRN: [id number]            FIN: [id number]               Age:   62 years     Sex:  Female     :  1955   Associated Diagnoses:   Alcoholic hepatitis; Cognitive impairment; COPD (chronic obstructive pulmonary disease); CVA (cerebrovascular accident); Diabetes mellitus; Left hemiparesis; Left homonymous hemianopsia; Major depressive disorder   Author:   Stanford Gayle MD      History of Present Illness   From  visit   \"The patient was admitted to the [facility] on 2016.  The patient is a 61-year-old female with a history of anxiety and cognitive impairment, past cervical cancer, meralgia paresthetica of the right side, left-sided weakness, past stroke, seizure disorder, and COPD.  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  She has also had an unintentional overdose of opiates.  She has chronic constipation.  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  She was admitted, it looks like, from [facility] where she had spent four days.  She was admitted there on  after being discharged from a TCU.  Apparently family was no longer able to provide the care she needed and  stated that she had exhausted her benefits in TCU and a long-term care facility would need to be sought, which is how she came here to the [facility]\"  No change since arrival  Still struggles with pain      Review of Systems             Health Status   Allergies:    Allergic Reactions (Selected)  Severity Not Documented  TraZODone (Anxiety)   Medications:  (Selected)   Documented Medications  Documented  Dulcolax Laxative: ( 5 mg ), po, daily, 0 Refill(s), Type: Maintenance  Incruse Ellipta 62.5 mcg/inh inhalation powder: ( 62.5 mcg ), inh, q 24 hrs, 0 Refill(s), Type: Maintenance  Milk of Magnesia: po, hs, 0 Refill(s), Type: Maintenance  OxyCONTIN 10 mg oral tablet, extended release: 1 tab(s) ( 10 mg ), PO, q12hr, 0 Refill(s), Type: " Maintenance  RisperDAL 1 mg oral tablet: 1 tab(s) ( 1 mg ), PO, BID, Instructions: 1 tablet po in the morning, 1 tablet at noon and 2 tablets at bedtime, # 60 tab(s), 0 Refill(s), Type: Maintenance  Tylenol 500 mg oral tablet: 1 tab(s) ( 500 mg ), PO, q4hr, PRN: for pain, 0 Refill(s), Type: Maintenance  Ventolin HFA 90 mcg/inh inhalation aerosol: 2 puff(s), inh, q4 hrs, PRN: as needed for wheezing, 0 Refill(s), Type: Maintenance  aspirin 81 mg oral tablet: 1 tab(s) ( 81 mg ), po, hs, 0 Refill(s), Type: Maintenance  bisacodyl: ( 5 mg ), po, daily, 0 Refill(s), Type: Maintenance  cholecalciferol 1000 intl units oral capsule: See Instructions, Instructions: 1 cap(s) po bid, 0 Refill(s), Type: Maintenance  clonazePAM 0.5 mg oral tablet: 0.5 tab(s) ( 0.25 mg ), po, qam, Instructions: and 1 tab(s) at hs, 0 Refill(s), Type: Maintenance  gabapentin 300 mg oral capsule: See Instructions, Instructions: 600-300-600, 0 Refill(s), Type: Maintenance  lidocaine 5% topical film: See Instructions, Instructions: 1 patch(es) to painful area of skin for up to 12hrs within a 24hr period, 0 Refill(s), Type: Maintenance  omega-3 polyunsaturated fatty acids 1000 mg oral capsule: 1 cap(s) ( 1,000 mg ), po, daily, 0 Refill(s), Type: Maintenance  oxyCODONE 5 mg oral capsule: 1 cap(s) ( 5 mg ), po, q6 hrs, Instructions: 1-2 tablets po every 4 hrs if needed for pain. Maximum of 2 tablets per day, 0 Refill(s), Type: Maintenance  venlafaxine 75 mg oral tablet: 2 tab(s) ( 150 mg ), po, daily, 0 Refill(s), Type: Maintenance   Problem list:    All Problems  Alcoholic hepatitis / SNOMED CT 206653792 / Confirmed  Anemia / SNOMED CT 125082461 / Confirmed  Anxiety / SNOMED CT 5755762464 / Confirmed  Dysphagia / SNOMED CT 104225065 / Confirmed  Cerebral edema / SNOMED CT 5277220 / Confirmed  COPD (chronic obstructive pulmonary disease) / SNOMED CT 87926228 / Confirmed  Chronic pain / SNOMED CT 801417789 / Confirmed  CVA (cerebrovascular accident) /  SNOMED CT 543277718 / Confirmed  Diabetes mellitus / SNOMED CT 200110496 / Confirmed  Left foot drop / SNOMED CT 14733995 / Confirmed  Headache / SNOMED CT 6018797781 / Confirmed  Tachycardia / SNOMED CT 0396844734 / Confirmed  Hyperglycemia / SNOMED CT 388781065 / Confirmed  Cognitive impairment / SNOMED CT 8764187912 / Confirmed  Left hemiparesis / SNOMED CT 307112732 / Confirmed  Left homonymous hemianopsia / SNOMED CT 28669016 / Confirmed  Hypotension / SNOMED CT 913456006 / Confirmed  Major depressive disorder / SNOMED CT 6691493054 / Confirmed  Cervical cancer / SNOMED CT 363061681 / Confirmed  Meralgia paraesthetica / SNOMED CT 797617821 / Confirmed  Neuropathic pain / SNOMED CT 757072037 / Confirmed  Neuropathy / SNOMED CT 7650586348 / Confirmed  Urinary retention / SNOMED CT 7233264618 / Confirmed  Thrombocytopenia / SNOMED CT 4652255709 / Confirmed  Seizure / SNOMED CT 054272629 / Confirmed  Tobacco user / SNOMED CT 742474137 / Probable  Vitamin D deficiency / SNOMED CT 40168945 / Confirmed  Vocal cord paralysis / SNOMED CT 724043354 / Confirmed      Histories   Past Medical History:    Active  Cervical cancer (176255697): Onset on 8/3/2015 at 60 years.  CVA (cerebrovascular accident) (959117611): Onset on 4/28/2015 at 59 years.  Left foot drop (11274294): Onset on 4/20/2015 at 59 years.  Neuropathy (5686336820): Onset on 3/20/2015 at 59 years.  Comments:  11/30/2016 CST 3:50 PM CST - Kitty Shook  Pain of lower extremity.  Urinary retention (7450799676): Onset on 3/12/2015 at 59 years.  Meralgia paraesthetica (046309139): Onset on 6/21/2014 at 59 years.  Chronic pain (075928345): Onset on 5/27/2014 at 58 years.  Cerebral edema (2141200): Onset on 1/10/2014 at 58 years.  Headache (1495209585): Onset on 1/10/2014 at 58 years.  Tachycardia (3882543682): Onset on 8/6/2012 at 57 years.  Seizure (885876722): Onset on 5/30/2011 at 56 years.  Cognitive impairment (6752885084): Onset on 10/17/2010 at 55  years.  Left homonymous hemianopsia (05877787): Onset on 10/17/2010 at 55 years.  Dysphagia (519861807): Onset on 10/17/2010 at 55 years.  Vitamin D deficiency (01506087): Onset on 10/14/2010 at 55 years.  Vocal cord paralysis (085110611): Onset on 10/12/2010 at 55 years.  Hyperglycemia (128132014): Onset on 2010 at 55 years.  Major depressive disorder (9560055946): Onset on 10/11/2007 at 52 years.  Left hemiparesis (871157428)  Hypotension (763542171)  Thrombocytopenia (7593939143)  Anemia (224042242)  Alcoholic hepatitis (623200203)  COPD (chronic obstructive pulmonary disease) (76810687)  Diabetes mellitus (262161644)  Anxiety (5841778732)  Neuropathic pain (683646112)   Family History:    Diabetes mellitus  Grandmother (M)  Grandfather (M)  Aunt (M)  Breast cancer  Mother (Carmen): onset at 40 .  Aunt (M)  Sister: onset at 30 .  Heart disease  Grandfather (M)     Procedure history:    [facility] - Total abdominal hysterectomy and bilateral salpingo-oophorectomy (SNOMED CT 5249977535) on 2012 at 57 Years.   delivery (SNOMED CT 9856158269) in  at 26 Years.  Hysteroscopy (SNOMED CT 454848057).   Social History:        Tobacco Assessment: Current            Current every day smoker, Cigarettes, Total pack years: 20.                     Comments:                      2016 - Kitty Shook                     1/2 pack per day.      Substance Abuse Assessment: Denies Substance Abuse      Home and Environment Assessment            Marital status: .        Physical Examination   Neck:  Supple, Non-tender.    Respiratory:  Lungs are clear to auscultation, Respirations are non-labored.    Cardiovascular:  Normal rate, Regular rhythm.    Gastrointestinal:  Soft.    Neurologic:  Alert, left sided hemiparesis.       Impression and Plan   Diagnosis     Alcoholic hepatitis (MJW39-VS K70.10).     Cognitive impairment (CNB04-MD R41.89).     COPD (chronic obstructive pulmonary disease) (RZV11-LK  J44.9).     CVA (cerebrovascular accident) (UCS09-FX I63.9).     Diabetes mellitus (AAR29-SW E11.9).     Left hemiparesis (RAQ37-MR G81.94).     Left homonymous hemianopsia (PYG60-UQ H53.462).     Major depressive disorder (GBX05-OP F32.9).     Course:  Progressing as expected.    Plan:  fu 2 month.

## 2022-02-16 NOTE — PROGRESS NOTES
NICK TRUONG  : 1955  MRN: [id number]  [facility] Visit  Primary Care Physician: ELIJAH Gayle MD  S: I am seeing this patient for routine rounds for Dr. Gayle.  Staff has no concerns about this patient.     Her past medical history includes CVA, hypertension, and neuropathy.  O: Her blood pressure is 158/88.  Pulse is 81.  Respiratory rate is 16.  She is afebrile.  Her weight is 147 pounds.  She is alert and tells me that she has neuropathy.  She tells me she is able to get up to the bathroom with the assist of a device.  She tells me that she has a good appetite.  She struggles with some chronic pain.  A: Neuropathy.  P: No changes at this time.   D: 2017  T: 2017  NE Martinez/sandra  c:  [facility]

## 2022-02-16 NOTE — PROGRESS NOTES
Chief Complaint    c/o vomiting mucous and food , crackes in chest  History of Present Illness      64-year-old female here after cough.       Patient lives at the clinic long-term-care and has a history of a stroke and chronic pain as well as alcoholic hepatitis.  She has not had any troubles eating or with any swallowing difficulties.  But at lunch today she was eating roast beef and mashed potatoes and suddenly choked her she coughed a lot threw up and it went on for many minutes.  Now she feels okay but the nursing staff heard quite a bit of rhonchi in her lungs were concerned.  She has not had a fever her oxygen levels have been good there checked both right after lunch and now right before coming here  Review of Systems      Blood sugars have been normal in reviewing nursing notes there have been no changes in her overall condition  Physical Exam   Vitals & Measurements    T: 97.8   F (Tympanic)  HR: 72(Peripheral)  BP: 112/64       Patient's alert sitting in a wheelchair obvious facial weakness her words are easy to understand her speech is slightly slowed breathing is nonlabored there are some right lower rhonchi present no wheezes left is relatively clear  Assessment/Plan       1. COPD (chronic obstructive pulmonary disease) (J44.9)        She has COPD chronically and does not seem to be an issue in his case it could complicate if he has any worsening symptoms        2. CVA (cerebrovascular accident) (I63.9)         She has a history of stroke but again no troubling with her swallowing her medications which include oxycodone Risperdal clonazepam not changed       3. Diabetes mellitus (E11.9)         Blood sugars have been stable and there was no hypoglycemia       4. Aspiration pneumonia (J69.0)         She is probably aspirated and at risk for worsening pneumonia so I put her on doxycycline nursing staff to watch her and she will return for any worsening of symptoms       Orders:         doxycycline, = 1  cap(s) ( 100 mg ), Oral, bid, x 7 day(s), # 14 cap(s), 0 Refill(s), Type: Acute, (Ordered)         XR Chest PA/Lat (Request), Cough  Patient Information     Name:NICK TRUONG      Address:      [address] [place] [id number]     Sex:Female     YOB: 1955     [phone]     Emergency Contact:JASPAL TRUONG     MRN:[id number]     FIN:[id number]     Location:[facility]     Date of Service:2019      Primary Care Physician:       Stanford Gayle MD, [phone]      Attending Physician:       Hector GEORGE, Surjit, [phone]  Problem List/Past Medical History    Ongoing     Alcoholic hepatitis     Anemia     Anxiety     Cerebral edema     Chronic pain     Cognitive impairment     COPD (chronic obstructive pulmonary disease)     CVA (cerebrovascular accident)     Diabetes mellitus       Comments: BP Goal: <130/80     Dysphagia     Headache     Hyperglycemia     Hypotension     Left foot drop     Left hemiparesis     Left homonymous hemianopsia     Major depressive disorder     Meralgia paraesthetica     Neuropathic pain     Neuropathy       Comments: Pain of lower extremity.     Seizure       Comments: With subsequent CVA resulting with left sided hemiparesis.     Tachycardia     Thrombocytopenia     Tobacco user     Urinary retention     Vitamin D deficiency     Vocal cord paralysis    Historical     Cervical cancer  Procedure/Surgical History     [facility] - Total abdominal hysterectomy and bilateral salpingo-oophorectomy (2012)     Radiation ()      Comments: Radiation treatment for cervical cancer..      delivery ()     Hysteroscopy  Medications        Ventolin HFA 90 mcg/inh inhalation aerosol: 2 puff(s), inh, q4 hrs, PRN: as needed for wheezing, 0 Refill(s).        aspirin 81 mg oral tablet: 81 mg, 1 tab(s), po, hs, 0 Refill(s).        cholecalciferol 1000 intl units oral capsule: See Instructions, 1 cap(s) po daily, 0  Refill(s).        lidocaine 5% topical film: See Instructions, 1 patch(es) to painful area of skin for up to 12hrs within a 24hr period, 0 Refill(s).        omega-3 polyunsaturated fatty acids 1000 mg oral capsule: 1,000 mg, 1 cap(s), po, tid, 0 Refill(s).        RisperDAL 1 mg oral tablet: 1 mg, 1 tab(s), Oral, daily, 1mg BID and 0.5mg at HS, 0 Refill(s).        Incruse Ellipta 62.5 mcg/inh inhalation powder: 62.5 mcg, inh, q 24 hrs, 0 Refill(s).        Dulcolax Laxative: 2 tab(s), po, daily, 0 Refill(s).        oxyCODONE 5 mg oral capsule: 5 mg, 1 cap(s), po, q12 hrs, 30 cap(s), 0 Refill(s).        Cymbalta 30 mg oral delayed release capsule: 30 mg, 1 cap(s), po, bid, 30 cap(s), 0 Refill(s).        clonazePAM 0.125 mg oral tablet, disintegratin.125 mg, 1 tab(s), Oral, bid, 60 tab(s), 0 Refill(s).        gabapentin 300 mg oral capsule: 900 mg, 3 cap(s), Oral, hs, takes 600mg Qam and 600mg at lunch and 900mg at HS, 0 Refill(s).        acetaminophen 500 mg oral tablet: 1,000 mg, 2 tab(s), Oral, bid, 2 tabs prn q 6 hours for pain 6-10  1 tab prn  for pain 1-5, PRN: as needed for pain, 0 Refill(s).        MiraLax oral powder for reconstitution: 17 gm, Oral, every other day, 0 Refill(s).        Probiotic Formula (Bacillus Coagulans) oral capsule: 1 cap(s), Oral, daily, 0 Refill(s).        Multi-Day Plus Minerals: Oral, daily, 0 Refill(s).        doxycycline hyclate 100 mg oral capsule: 100 mg, 1 cap(s), Oral, bid, for 7 day(s), 14 cap(s), 0 Refill(s).         Allergies    traZODone (anxiety)  Social History    Smoking Status - 2019     Never smoker     Employment/School      Disability, 2019     Home/Environment      Marital status: ., 2016     Substance Abuse - Denies Substance Abuse, 2016     Tobacco - Current, 2016      Current every day smoker, Cigarettes, Total pack years: 20., 2016  Family History    Breast cancer: Mother (Dx about 40), Sister (Dx about 30) and Aunt  (M).    Diabetes mellitus: Aunt (M), Grandfather (M) and Grandmother (M).    Heart disease: Grandfather (M).  Immunizations      Vaccine Date Status      influenza virus vaccine, inactivated 11/15/2013 Recorded      pneumococcal (PPSV23) 07/19/2012 Recorded      Td 03/17/2004 Recorded      Hep B 04/10/1992 Recorded      Hep B 11/21/1991 Recorded      Hep B 10/15/1991 Recorded

## 2022-02-16 NOTE — PROGRESS NOTES
Patient:   NICK TRUONG            MRN: [id number]            FIN: [id number]               Age:   65 years     Sex:  Female     :  1955   Associated Diagnoses:   Chronic pain; COPD (chronic obstructive pulmonary disease); CVA (cerebrovascular accident); Left hemiparesis; Left homonymous hemianopsia   Author:   Stanford Gayle MD      Chief Complaint      History of Present Illness   Neuropathic pain   right sided continues   On gabapentin and cymbalta  No other new sxs  Both upper and lower extremity complaints           Review of Systems   Constitutional:  Negative except as documented in history of present illness.    Respiratory:  Negative.    Cardiovascular:  Negative.    Gastrointestinal:  Negative.    Genitourinary:  Negative.    Musculoskeletal:  Negative except as documented in history of present illness.    Integumentary:  Negative.    Neurologic:  Negative except as documented in history of present illness.              Health Status   Allergies:    Allergic Reactions (Selected)  Severity Not Documented  TraZODone (Anxiety)   Medications:  (Selected)   Prescriptions  Prescribed  Cymbalta 30 mg oral delayed release capsule: = 2 cap(s) ( 60 mg ), po, bid, # 360 cap(s), 3 Refill(s), Type: Maintenance  Luminas Pain Relief Patch: Luminas Pain Relief Patch, See Instructions, Instructions: Apply 1 patch every 24 hours, Supply, # 1 box(es), 11 Refill(s), Type: Maintenance  clonazePAM 0.125 mg oral tablet, disintegratin tab(s) ( 0.125 mg ), Oral, bid, # 60 tab(s), 0 Refill(s), Type: Maintenance  lidocaine 5% topical film: 1 patch(es), Topical, daily, # 30 patch(es), 11 Refill(s), Type: Maintenance, Pharmacy: [facility], 1 patch(es) Topical daily, Weight Measured  oxyCODONE 5 mg oral tablet: = 1 tab(s) ( 5 mg ), Oral, hs, PRN: as needed for pain, # 15 tab(s), 0 Refill(s), Type: Maintenance  Documented Medications  Documented  Dulcolax Laxative: = 2 tab(s), po, daily, 0 Refill(s), Type:  Maintenance  Incruse Ellipta 62.5 mcg/inh inhalation powder: ( 62.5 mcg ), inh, q 24 hrs, 0 Refill(s), Type: Maintenance  MiraLax oral powder for reconstitution: ( 17 gm ), Oral, every other day, 0 Refill(s), Type: Maintenance  RisperDAL 1 mg oral tablet: = 1 tab(s) ( 1 mg ), Oral, daily, Instructions: 1mg BID and 0.5mg at HS, 0 Refill(s), Type: Maintenance  Ventolin HFA 90 mcg/inh inhalation aerosol: 2 puff(s), inh, q4 hrs, PRN: as needed for wheezing, 0 Refill(s), Type: Maintenance  acetaminophen 500 mg oral tablet: = 2 tab(s) ( 1,000 mg ), Oral, bid, Instructions: and 2 tabs prn q 6 hours for pain 6-10, 1 tab prn  for pain 1-5, PRN: as needed for pain, 0 Refill(s), Type: Maintenance  aspirin 81 mg oral tablet: 1 tab(s) ( 81 mg ), po, hs, 0 Refill(s), Type: Maintenance  gabapentin 300 mg oral capsule: = 3 cap(s) ( 900 mg ), Oral, hs, Instructions: takes 600mg Qam and 600mg at lunch and 900mg at HS, 0 Refill(s), Type: Maintenance   Problem list:    All Problems (Selected)  Alcoholic hepatitis / SNOMED CT 829944999 / Confirmed  Anemia / SNOMED CT 880265544 / Confirmed  Anxiety / SNOMED CT 4002478330 / Confirmed  Dysphagia / SNOMED CT 398108436 / Confirmed  Cerebral edema / SNOMED CT 4340367 / Confirmed  COPD (chronic obstructive pulmonary disease) / SNOMED CT 98344798 / Confirmed  Chronic pain / SNOMED CT 668331613 / Confirmed  CVA (cerebrovascular accident) / SNOMED CT 835287587 / Confirmed  Diabetes mellitus / SNOMED CT 251044962 / Confirmed  Left foot drop / SNOMED CT 98031533 / Confirmed  Headache / SNOMED CT 3140796567 / Confirmed  Tachycardia / SNOMED CT 6280091501 / Confirmed  Hyperglycemia / SNOMED CT 620591115 / Confirmed  Cognitive impairment / SNOMED CT 5006295906 / Confirmed  Left hemiparesis / SNOMED CT 580765412 / Confirmed  Left homonymous hemianopsia / SNOMED CT 44262319 / Confirmed  Hypotension / SNOMED CT 734507183 / Confirmed  Major depressive disorder / SNOMED CT 4405662596 / Confirmed  Meralgia  paraesthetica / SNOMED CT 728417480 / Confirmed  Neuropathic pain / SNOMED CT 955832605 / Confirmed  Neuropathy / SNOMED CT 7573971297 / Confirmed  Urinary retention / SNOMED CT 9923010943 / Confirmed  Thrombocytopenia / SNOMED CT 9843329262 / Confirmed  Tobacco user / SNOMED CT 935225005 / Probable  Vitamin D deficiency / SNOMED CT 74208106 / Confirmed  Vocal cord paralysis / SNOMED CT 652667880 / Confirmed      Histories   Past Medical History:    Active  Alcoholic hepatitis (092012077): Onset in 2016 at 61 years.  CVA (cerebrovascular accident) (598601481): Onset on 4/28/2015 at 59 years.  Left foot drop (52171133): Onset on 4/20/2015 at 59 years.  Neuropathy (2424471843): Onset on 3/20/2015 at 59 years.  Comments:  11/30/2016 CST 3:50 PM CST - Kitty Shook  Pain of lower extremity.  Urinary retention (1098534465): Onset on 3/12/2015 at 59 years.  Meralgia paraesthetica (219629504): Onset on 6/21/2014 at 59 years.  Chronic pain (059135640): Onset on 5/27/2014 at 58 years.  Cerebral edema (4447143): Onset on 1/10/2014 at 58 years.  Headache (9614684287): Onset on 1/10/2014 at 58 years.  Tachycardia (1626667326): Onset on 8/6/2012 at 57 years.  Cognitive impairment (4118685858): Onset on 10/17/2010 at 55 years.  Left homonymous hemianopsia (74660068): Onset on 10/17/2010 at 55 years.  Dysphagia (645124963): Onset on 10/17/2010 at 55 years.  Vitamin D deficiency (85352811): Onset on 10/14/2010 at 55 years.  Vocal cord paralysis (772370438): Onset on 10/12/2010 at 55 years.  Hyperglycemia (109091879): Onset on 9/30/2010 at 55 years.  Major depressive disorder (9353646477): Onset on 10/11/2007 at 52 years.  Left hemiparesis (018184107)  Hypotension (563075677)  Thrombocytopenia (1425733100)  Anemia (845531921)  COPD (chronic obstructive pulmonary disease) (79008574)  Diabetes mellitus (174438425)  Comments:  4/13/2018 CDT 10:19 AM CDT - Scarlett Isaac  BP Goal: <130/80  Anxiety (8910146652)  Neuropathic pain  (530594637)  Resolved  Cervical cancer (644574499): Onset on 8/3/2015 at 60 years.  Resolved.   Family History:    Diabetes mellitus  Grandmother (M)  Grandfather (M)  Aunt (M)  Breast cancer  Mother (Carmen): onset at 40 .  Aunt (M)  Sister: onset at 30 .  Heart disease  Grandfather (M)     Procedure history:    [facility] - Total abdominal hysterectomy and bilateral salpingo-oophorectomy (SNOMED CT 6750509735) on 2012 at 57 Years.  Radiation (SNOMED CT 985028043) in  at 56 Years.  Comments:  2019 10:53 AM CST - Kitty Shook  Radiation treatment for cervical cancer.   delivery (SNOMED CT 5947215437) in  at 26 Years.  Hysteroscopy (SNOMED CT 628751687).   Social History:        Tobacco Assessment: Current            Current every day smoker, Cigarettes, Total pack years: 20.                     Comments:                      2016 - Kitty Shook                     1/2 pack per day.      Substance Abuse Assessment: Denies Substance Abuse      Employment and Education Assessment            Disability      Home and Environment Assessment            Marital status: .        Physical Examination   VS/Measurements   General:  Alert and oriented, No acute distress.    Neurologic:  Alert, Oriented, right sided hemiplegia.       Impression and Plan   Diagnosis     Chronic pain (HJG22-PH G89.29).     COPD (chronic obstructive pulmonary disease) (GCS82-JX J44.9).     CVA (cerebrovascular accident) (MNS99-IE I63.9).     Left hemiparesis (VTV80-OI G81.94).     Left homonymous hemianopsia (UEH77-SC H53.462).     Course:  Unchanged.    Plan:  down to 1 oxycodone daily.    Patient Instructions:       Counseled: Patient, Regarding diagnosis, Regarding treatment, Regarding medications.

## 2022-02-16 NOTE — NURSING NOTE
Comprehensive Intake Entered On:  1/14/2020 10:28 AM CST    Performed On:  1/14/2020 10:26 AM CST by Scarlett Isaac               Summary   Chief Complaint :   Ears plugged per kinni   Systolic Blood Pressure :   144 mmHg (HI)    Diastolic Blood Pressure :   97 mmHg (HI)    Mean Arterial Pressure :   113 mmHg   Peripheral Pulse Rate :   91 bpm   BP Method :   Electronic   HR Method :   Electronic   Temperature Tympanic :   98.4 DegF(Converted to: 36.9 DegC)    Scarlett Isaac - 1/14/2020 10:26 AM CST

## 2022-02-16 NOTE — PROGRESS NOTES
Patient:   NICK TRUONG            MRN: [id number]            FIN: [id number]               Age:   63 years     Sex:  Female     :  1955   Associated Diagnoses:   Chronic pain   Author:   Stanford Gayle MD      Chief Complaint      History of Present Illness   Neuropathic pain   right sided continues  On gabapentin   No other new sxs  Both upper and lowe r extremity complaints  cymbalta as well         Review of Systems   Constitutional:  Negative except as documented in history of present illness.    Respiratory:  Negative.    Cardiovascular:  Negative.    Gastrointestinal:  Negative.    Genitourinary:  Negative.    Musculoskeletal:  Negative except as documented in history of present illness.    Integumentary:  Negative.    Neurologic:  Negative except as documented in history of present illness.              Health Status   Allergies:    Allergic Reactions (Selected)  Severity Not Documented  TraZODone (Anxiety)   Medications:  (Selected)   Prescriptions  Prescribed  Cymbalta 30 mg oral delayed release capsule: 1 cap(s) ( 30 mg ), po, daily, # 30 cap(s), 0 Refill(s), Type: Maintenance  clonazePAM 0.125 mg oral tablet, disintegratin tab(s) ( 0.125 mg ), Oral, bid, # 60 tab(s), 0 Refill(s), Type: Maintenance  methadone 5 mg/5 mL oral solution: 3 mL ( 3 mg ), PO, q12 hrs, PRN: for pain, # 180 mL, 0 Refill(s), Type: Maintenance  oxyCODONE 5 mg oral capsule: 1 cap(s) ( 5 mg ), po, q12 hrs, # 30 cap(s), 0 Refill(s), Type: Maintenance  Documented Medications  Documented  Butrans 5 mcg/hr transdermal film, extended release: 1 patch(es), Topical, qweek, Instructions: 1 patch once weekly  apply to clean, dry, intact skin  Rx is given by pain specialist, 0 Refill(s), Type: Maintenance  Dulcolax Laxative: ( 5 mg ), po, daily, 0 Refill(s), Type: Maintenance  Incruse Ellipta 62.5 mcg/inh inhalation powder: ( 62.5 mcg ), inh, q 24 hrs, 0 Refill(s), Type: Maintenance  RisperDAL 1 mg oral tablet: See Instructions,  Instructions: 2mg qam, 1mg in afternoon and 1mg at HS., 0 Refill(s), Type: Maintenance  Tylenol 500 mg oral tablet: 1 tab(s) ( 500 mg ), PO, q4hr, PRN: for pain, 0 Refill(s), Type: Maintenance  Ventolin HFA 90 mcg/inh inhalation aerosol: 2 puff(s), inh, q4 hrs, PRN: as needed for wheezing, 0 Refill(s), Type: Maintenance  aspirin 81 mg oral tablet: 1 tab(s) ( 81 mg ), po, hs, 0 Refill(s), Type: Maintenance  bisacodyl: ( 5 mg ), po, daily, 0 Refill(s), Type: Maintenance  cholecalciferol 1000 intl units oral capsule: See Instructions, Instructions: 1 cap(s) po bid, 0 Refill(s), Type: Maintenance  gabapentin 600 mg oral tablet: 1 tab(s) ( 600 mg ), PO, TID, # 270 tab(s), 0 Refill(s), Type: Maintenance  lidocaine 5% topical film: See Instructions, Instructions: 1 patch(es) to painful area of skin for up to 12hrs within a 24hr period, 0 Refill(s), Type: Maintenance  omega-3 polyunsaturated fatty acids 1000 mg oral capsule: 1 cap(s) ( 1,000 mg ), po, daily, 0 Refill(s), Type: Maintenance,    Medications          *denotes recorded medication          Cymbalta 30 mg oral delayed release capsule: 30 mg, 1 cap(s), po, daily, 30 cap(s), 0 Refill(s).          *Tylenol 500 mg oral tablet: 500 mg, 1 tab(s), PO, q4hr, PRN: for pain, 0 Refill(s).          *Ventolin HFA 90 mcg/inh inhalation aerosol: 2 puff(s), inh, q4 hrs, PRN: as needed for wheezing, 0 Refill(s).          *aspirin 81 mg oral tablet: 81 mg, 1 tab(s), po, hs, 0 Refill(s).          *bisacodyl: 5 mg, po, daily, 0 Refill(s).          *Dulcolax Laxative: 5 mg, po, daily, 0 Refill(s).          *Butrans 5 mcg/hr transdermal film, extended release: 1 patch(es), Topical, qweek, 1 patch once weekly  apply to clean, dry, intact skin  Rx is given by pain specialist, 0 Refill(s).          *cholecalciferol 1000 intl units oral capsule: See Instructions, 1 cap(s) po bid, 0 Refill(s).          clonazePAM 0.125 mg oral tablet, disintegratin.125 mg, 1 tab(s), Oral, bid, 60  tab(s), 0 Refill(s).          *gabapentin 600 mg oral tablet: 600 mg, 1 tab(s), PO, TID, 270 tab(s), 0 Refill(s).          *lidocaine 5% topical film: See Instructions, 1 patch(es) to painful area of skin for up to 12hrs within a 24hr period, 0 Refill(s).          methadone 5 mg/5 mL oral solution: 3 mg, 3 mL, PO, q12 hrs, PRN: for pain, 180 mL, 0 Refill(s).          *omega-3 polyunsaturated fatty acids 1000 mg oral capsule: 1,000 mg, 1 cap(s), po, daily, 0 Refill(s).          oxyCODONE 5 mg oral capsule: 5 mg, 1 cap(s), po, q12 hrs, 30 cap(s), 0 Refill(s).          *RisperDAL 1 mg oral tablet: See Instructions, 2mg qam, 1mg in afternoon and 1mg at HS., 0 Refill(s).          *Incruse Ellipta 62.5 mcg/inh inhalation powder: 62.5 mcg, inh, q 24 hrs, 0 Refill(s).   Problem list:    All Problems  Alcoholic hepatitis / SNOMED CT 362777551 / Confirmed  Anemia / SNOMED CT 566442418 / Confirmed  Anxiety / SNOMED CT 8030746345 / Confirmed  Dysphagia / SNOMED CT 403084524 / Confirmed  Cerebral edema / SNOMED CT 5496006 / Confirmed  COPD (chronic obstructive pulmonary disease) / SNOMED CT 30594421 / Confirmed  Chronic pain / SNOMED CT 557958919 / Confirmed  CVA (cerebrovascular accident) / SNOMED CT 046984983 / Confirmed  Diabetes mellitus / SNOMED CT 564640189 / Confirmed  Left foot drop / SNOMED CT 88472376 / Confirmed  Headache / SNOMED CT 4071067026 / Confirmed  Tachycardia / SNOMED CT 7644770017 / Confirmed  Hyperglycemia / SNOMED CT 621809543 / Confirmed  Cognitive impairment / SNOMED CT 7459782109 / Confirmed  Left hemiparesis / SNOMED CT 601389599 / Confirmed  Left homonymous hemianopsia / SNOMED CT 53251746 / Confirmed  Hypotension / SNOMED CT 526247048 / Confirmed  Major depressive disorder / SNOMED CT 6456269802 / Confirmed  Cervical cancer / SNOMED CT 414224154 / Confirmed  Meralgia paraesthetica / SNOMED CT 645696539 / Confirmed  Neuropathic pain / SNOMED CT 843897597 / Confirmed  Neuropathy / SNOMED CT 6404703307  / Confirmed  Urinary retention / SNOMED CT 2414812658 / Confirmed  Thrombocytopenia / SNOMED CT 1725488178 / Confirmed  Seizure / SNOMED CT 891299377 / Confirmed  Tobacco user / SNOMED CT 101064528 / Probable  Vitamin D deficiency / SNOMED CT 37675385 / Confirmed  Vocal cord paralysis / SNOMED CT 354361867 / Confirmed      Histories   Past Medical History:    Active  Cervical cancer (167704012): Onset on 8/3/2015 at 60 years.  CVA (cerebrovascular accident) (527947287): Onset on 4/28/2015 at 59 years.  Left foot drop (01859162): Onset on 4/20/2015 at 59 years.  Neuropathy (2429491069): Onset on 3/20/2015 at 59 years.  Comments:  11/30/2016 CST 3:50 PM CST - Kitty Shook  Pain of lower extremity.  Urinary retention (8969249529): Onset on 3/12/2015 at 59 years.  Meralgia paraesthetica (773221623): Onset on 6/21/2014 at 59 years.  Chronic pain (160814849): Onset on 5/27/2014 at 58 years.  Cerebral edema (0000737): Onset on 1/10/2014 at 58 years.  Headache (6425788121): Onset on 1/10/2014 at 58 years.  Tachycardia (8599647694): Onset on 8/6/2012 at 57 years.  Seizure (615369002): Onset on 5/30/2011 at 56 years.  Cognitive impairment (7863754213): Onset on 10/17/2010 at 55 years.  Left homonymous hemianopsia (88218887): Onset on 10/17/2010 at 55 years.  Dysphagia (831306149): Onset on 10/17/2010 at 55 years.  Vitamin D deficiency (68566038): Onset on 10/14/2010 at 55 years.  Vocal cord paralysis (558437722): Onset on 10/12/2010 at 55 years.  Hyperglycemia (010410516): Onset on 9/30/2010 at 55 years.  Major depressive disorder (9539526409): Onset on 10/11/2007 at 52 years.  Left hemiparesis (798965835)  Hypotension (736478934)  Thrombocytopenia (7765329666)  Anemia (483203051)  Alcoholic hepatitis (294856824)  COPD (chronic obstructive pulmonary disease) (16940213)  Diabetes mellitus (797403163)  Comments:  4/13/2018 CDT 10:19 AM CDT - Scarlett Isaac  BP Goal: <130/80  Anxiety (3246310420)  Neuropathic pain  (681977455)   Family History:    Diabetes mellitus  Grandmother (M)  Grandfather (M)  Aunt (M)  Breast cancer  Mother (Carmen): onset at 40 .  Aunt (M)  Sister: onset at 30 .  Heart disease  Grandfather (M)     Procedure history:    [facility] - Total abdominal hysterectomy and bilateral salpingo-oophorectomy (SNOMED CT 5907970412) on 2012 at 57 Years.   delivery (SNOMED CT 2333423254) in  at 26 Years.  [facility]   Social History:        Tobacco Assessment: Current            Current every day smoker, Cigarettes, Total pack years: 20.                     Comments:                      2016 - Kitty Shook                     1/2 pack per day.      Substance Abuse Assessment: Denies Substance Abuse      Home and Environment Assessment            Marital status: .      Physical Examination   VS/Measurements   General:  Alert and oriented, No acute distress.    Neck:  Supple.    Respiratory:  Lungs are clear to auscultation, Respirations are non-labored.    Cardiovascular:  Normal rate, Regular rhythm.    Gastrointestinal:  Soft.    Musculoskeletal:  No tenderness, No swelling.    Integumentary:  No rash.    Neurologic:  Alert, Oriented, right sided hemiplegia.       Impression and Plan   Diagnosis     Chronic pain (VOK28-SF G89.29).     Course:  Unchanged.    Plan:  no change.    Patient Instructions:       Counseled: Patient, Regarding diagnosis, Regarding treatment, Regarding medications.

## 2022-02-16 NOTE — PROGRESS NOTES
Patient:   NICK TRUONG            MRN: [id number]            FIN: [id number]               Age:   62 years     Sex:  Female     :  1955   Associated Diagnoses:   Chronic pain   Author:   Stanford Gayle MD      Visit Information      Date of Service: 2017 01:20 pm  Performing Location: [facility]  Encounter#: [id number]      Primary Care Provider (PCP):  Stanford Gayle MD    NPI# [id number]      Referring Provider:  Stanford Gayle MD, NPI# [id number]      Chief Complaint   2017 1:25 PM CDT    Neuropathy per kinnic.        History of Present Illness   Neuropathic pain worse last few months  On gabapentin recently increased  No other new sxs  Both upper and lowe r extremity complaints  worse is right thigh and left ankle      Review of Systems   Constitutional:  Negative except as documented in history of present illness.    Respiratory:  Negative.    Cardiovascular:  Negative.    Gastrointestinal:  Negative.    Genitourinary:  Negative.    Musculoskeletal:  Negative except as documented in history of present illness.    Integumentary:  Negative.    Neurologic:  Negative except as documented in history of present illness.              Health Status   Allergies:    Allergic Reactions (Selected)  Severity Not Documented  TraZODone (Anxiety)   Medications:  (Selected)   Documented Medications  Documented  Dulcolax Laxative: ( 5 mg ), po, daily, 0 Refill(s), Type: Maintenance  Incruse Ellipta 62.5 mcg/inh inhalation powder: ( 62.5 mcg ), inh, q 24 hrs, 0 Refill(s), Type: Maintenance  Milk of Magnesia: po, hs, 0 Refill(s), Type: Maintenance  OxyCONTIN 10 mg oral tablet, extended release: 1 tab(s) ( 10 mg ), PO, q12hr, 0 Refill(s), Type: Maintenance  RisperDAL 1 mg oral tablet: 1 tab(s) ( 1 mg ), PO, BID, Instructions: 1 tablet po in the morning, 1 tablet at noon and 2 tablets at bedtime, # 60 tab(s), 0 Refill(s), Type: Maintenance  Tylenol 500 mg oral tablet: 1 tab(s) ( 500  mg ), PO, q4hr, PRN: for pain, 0 Refill(s), Type: Maintenance  Ventolin HFA 90 mcg/inh inhalation aerosol: 2 puff(s), inh, q4 hrs, PRN: as needed for wheezing, 0 Refill(s), Type: Maintenance  aspirin 81 mg oral tablet: 1 tab(s) ( 81 mg ), po, hs, 0 Refill(s), Type: Maintenance  bisacodyl: ( 5 mg ), po, daily, 0 Refill(s), Type: Maintenance  cholecalciferol 1000 intl units oral capsule: See Instructions, Instructions: 1 cap(s) po bid, 0 Refill(s), Type: Maintenance  clonazePAM 0.5 mg oral tablet: 0.5 tab(s) ( 0.25 mg ), po, qam, Instructions: and 1 tab(s) at hs, 0 Refill(s), Type: Maintenance  gabapentin 300 mg oral capsule: See Instructions, Instructions: 600-300-600, 0 Refill(s), Type: Maintenance  lidocaine 5% topical film: See Instructions, Instructions: 1 patch(es) to painful area of skin for up to 12hrs within a 24hr period, 0 Refill(s), Type: Maintenance  omega-3 polyunsaturated fatty acids 1000 mg oral capsule: 1 cap(s) ( 1,000 mg ), po, daily, 0 Refill(s), Type: Maintenance  oxyCODONE 5 mg oral capsule: 1 cap(s) ( 5 mg ), po, q6 hrs, Instructions: 1-2 tablets po every 4 hrs if needed for pain. Maximum of 2 tablets per day, 0 Refill(s), Type: Maintenance  venlafaxine 75 mg oral tablet: 2 tab(s) ( 150 mg ), po, daily, 0 Refill(s), Type: Maintenance   Problem list:    All Problems  Alcoholic hepatitis / SNOMED CT 626508910 / Confirmed  Anemia / SNOMED CT 697640995 / Confirmed  Anxiety / SNOMED CT 0340430996 / Confirmed  Dysphagia / SNOMED CT 937401969 / Confirmed  Cerebral edema / SNOMED CT 6068892 / Confirmed  COPD (chronic obstructive pulmonary disease) / SNOMED CT 08596022 / Confirmed  Chronic pain / SNOMED CT 274121361 / Confirmed  CVA (cerebrovascular accident) / SNOMED CT 357189609 / Confirmed  Diabetes mellitus / SNOMED CT 147109657 / Confirmed  Left foot drop / SNOMED CT 47613834 / Confirmed  Headache / SNOMED CT 9407504073 / Confirmed  Tachycardia / SNOMED CT 1434588645 / Confirmed  Hyperglycemia /  SNOMED CT 550124987 / Confirmed  Cognitive impairment / SNOMED CT 4947543418 / Confirmed  Left hemiparesis / SNOMED CT 774172553 / Confirmed  Left homonymous hemianopsia / SNOMED CT 67109617 / Confirmed  Hypotension / SNOMED CT 074475364 / Confirmed  Major depressive disorder / SNOMED CT 3832253803 / Confirmed  Cervical cancer / SNOMED CT 471020202 / Confirmed  Meralgia paraesthetica / SNOMED CT 472298896 / Confirmed  Neuropathy / SNOMED CT 8155358203 / Confirmed  Urinary retention / SNOMED CT 6816986571 / Confirmed  Thrombocytopenia / SNOMED CT 3386976777 / Confirmed  Seizure / SNOMED CT 073713275 / Confirmed  Tobacco user / SNOMED CT 143566711 / Probable  Vitamin D deficiency / SNOMED CT 09205646 / Confirmed  Vocal cord paralysis / SNOMED CT 925228280 / Confirmed      Histories   Past Medical History:    Active  Cervical cancer (877644373): Onset on 8/3/2015 at 60 years.  CVA (cerebrovascular accident) (804348019): Onset on 4/28/2015 at 59 years.  Left foot drop (06836632): Onset on 4/20/2015 at 59 years.  Neuropathy (1458260085): Onset on 3/20/2015 at 59 years.  Comments:  11/30/2016 CST 3:50 PM CST - Kitty Shook  Pain of lower extremity.  Urinary retention (7826299464): Onset on 3/12/2015 at 59 years.  Meralgia paraesthetica (082640502): Onset on 6/21/2014 at 59 years.  Chronic pain (119694684): Onset on 5/27/2014 at 58 years.  Cerebral edema (2425183): Onset on 1/10/2014 at 58 years.  Headache (9367301193): Onset on 1/10/2014 at 58 years.  Tachycardia (5628043270): Onset on 8/6/2012 at 57 years.  Seizure (800624601): Onset on 5/30/2011 at 56 years.  Cognitive impairment (9742987413): Onset on 10/17/2010 at 55 years.  Left homonymous hemianopsia (02850817): Onset on 10/17/2010 at 55 years.  Dysphagia (546221466): Onset on 10/17/2010 at 55 years.  Vitamin D deficiency (36115198): Onset on 10/14/2010 at 55 years.  Vocal cord paralysis (448194631): Onset on 10/12/2010 at 55 years.  Hyperglycemia (807697474):  Onset on 2010 at 55 years.  Major depressive disorder (9105061465): Onset on 10/11/2007 at 52 years.  Left hemiparesis (832083368)  Hypotension (581444847)  Thrombocytopenia (6587057937)  Anemia (431156741)  Alcoholic hepatitis (011720559)  COPD (chronic obstructive pulmonary disease) (02995156)  Diabetes mellitus (765996757)  Anxiety (9058855834)   Family History:    Diabetes mellitus  Grandmother (M)  Grandfather (M)  Aunt (M)  Breast cancer  Mother (Carmen): onset at 40 .  Aunt (M)  Sister: onset at 30 .  Heart disease  Grandfather (M)     Procedure history:    [facility] - Total abdominal hysterectomy and bilateral salpingo-oophorectomy (SNOMED CT 1293740032) on 2012 at 57 Years.   delivery (SNOMED CT 2632649353) in  at 26 Years.  Hysteroscopy (SNOMED CT 943336409).   Social History:        Tobacco Assessment: Current            Current every day smoker, Cigarettes, Total pack years: 20.                     Comments:                      2016 - Kitty Shook                     1/2 pack per day.      Substance Abuse Assessment: Denies Substance Abuse      Home and Environment Assessment            Marital status: .        Physical Examination   Vital Signs   2017 1:25 PM CDT Temperature Tympanic 97.2 DegF  LOW    Peripheral Pulse Rate 89 bpm    Systolic Blood Pressure 103 mmHg    Diastolic Blood Pressure 72 mmHg    Mean Arterial Pressure 82 mmHg      General:  Alert and oriented, No acute distress.    Musculoskeletal:  No tenderness, No swelling.    Integumentary:  No rash.    Neurologic:  Alert, Oriented, Cranial Nerves II-XII are grossly intact.       Impression and Plan   Diagnosis     Chronic pain (LEK31-WX G89.29).     Course:  Worsening.    Plan:  increase risperidal.    Patient Instructions:       Counseled: Patient, Regarding diagnosis, Regarding treatment, Regarding medications.

## 2022-02-16 NOTE — PROGRESS NOTES
Patient:   NICK TRUONG            MRN: [id number]                           Age:   63 years     Sex:  Female     :  1955   Associated Diagnoses:   Chronic pain   Author:   Stanford Gayle MD      Visit Information      Date of Service:    Performing Location:   Encounter#:      Primary Care Provider (PCP):  Stanford Gayle MD    NPI# [id number]      Referring Provider:  No referring provider recorded for selected visit.      Chief Complaint      History of Present Illness   Neuropathic pain   right sided continues  On gabapentin   No other new sxs  Both upper and lowe r extremity complaints  cymbalta as well         Review of Systems   Constitutional:  Negative except as documented in history of present illness.    Respiratory:  Negative.    Cardiovascular:  Negative.    Gastrointestinal:  Negative.    Genitourinary:  Negative.    Musculoskeletal:  Negative except as documented in history of present illness.    Integumentary:  Negative.    Neurologic:  Negative except as documented in history of present illness.              Health Status   Allergies:    Allergic Reactions (Selected)  Severity Not Documented  TraZODone (Anxiety)   Medications:  (Selected)   Prescriptions  Prescribed  Cymbalta 30 mg oral delayed release capsule: 1 cap(s) ( 30 mg ), po, daily, # 30 cap(s), 0 Refill(s), Type: Maintenance  clonazePAM 0.125 mg oral tablet, disintegratin tab(s) ( 0.125 mg ), Oral, bid, # 60 tab(s), 0 Refill(s), Type: Maintenance  methadone 5 mg/5 mL oral solution: 3 mL ( 3 mg ), PO, q12 hrs, PRN: for pain, # 180 mL, 0 Refill(s), Type: Maintenance  oxyCODONE 5 mg oral capsule: 1 cap(s) ( 5 mg ), po, q12 hrs, # 30 cap(s), 0 Refill(s), Type: Maintenance  Documented Medications  Documented  Dulcolax Laxative: ( 5 mg ), po, daily, 0 Refill(s), Type: Maintenance  Incruse Ellipta 62.5 mcg/inh inhalation powder: ( 62.5 mcg ), inh, q 24 hrs, 0 Refill(s), Type: Maintenance  RisperDAL 1 mg oral tablet: See  Instructions, Instructions: 2mg qam, 1mg in afternoon and 1mg at HS., 0 Refill(s), Type: Maintenance  Tylenol 500 mg oral tablet: 1 tab(s) ( 500 mg ), PO, q4hr, PRN: for pain, 0 Refill(s), Type: Maintenance  Ventolin HFA 90 mcg/inh inhalation aerosol: 2 puff(s), inh, q4 hrs, PRN: as needed for wheezing, 0 Refill(s), Type: Maintenance  aspirin 81 mg oral tablet: 1 tab(s) ( 81 mg ), po, hs, 0 Refill(s), Type: Maintenance  bisacodyl: ( 5 mg ), po, daily, 0 Refill(s), Type: Maintenance  cholecalciferol 1000 intl units oral capsule: See Instructions, Instructions: 1 cap(s) po bid, 0 Refill(s), Type: Maintenance  gabapentin 600 mg oral tablet: 1 tab(s) ( 600 mg ), PO, TID, # 270 tab(s), 0 Refill(s), Type: Maintenance  lidocaine 5% topical film: See Instructions, Instructions: 1 patch(es) to painful area of skin for up to 12hrs within a 24hr period, 0 Refill(s), Type: Maintenance  omega-3 polyunsaturated fatty acids 1000 mg oral capsule: 1 cap(s) ( 1,000 mg ), po, daily, 0 Refill(s), Type: Maintenance,    Medications          *denotes recorded medication          Cymbalta 30 mg oral delayed release capsule: 30 mg, 1 cap(s), po, daily, 30 cap(s), 0 Refill(s).          *Tylenol 500 mg oral tablet: 500 mg, 1 tab(s), PO, q4hr, PRN: for pain, 0 Refill(s).          *Ventolin HFA 90 mcg/inh inhalation aerosol: 2 puff(s), inh, q4 hrs, PRN: as needed for wheezing, 0 Refill(s).          *aspirin 81 mg oral tablet: 81 mg, 1 tab(s), po, hs, 0 Refill(s).          *bisacodyl: 5 mg, po, daily, 0 Refill(s).          *Dulcolax Laxative: 5 mg, po, daily, 0 Refill(s).          *cholecalciferol 1000 intl units oral capsule: See Instructions, 1 cap(s) po bid, 0 Refill(s).          clonazePAM 0.125 mg oral tablet, disintegratin.125 mg, 1 tab(s), Oral, bid, 60 tab(s), 0 Refill(s).          *gabapentin 600 mg oral tablet: 600 mg, 1 tab(s), PO, TID, 270 tab(s), 0 Refill(s).          *lidocaine 5% topical film: See Instructions, 1 patch(es) to  painful area of skin for up to 12hrs within a 24hr period, 0 Refill(s).          methadone 5 mg/5 mL oral solution: 3 mg, 3 mL, PO, q12 hrs, PRN: for pain, 180 mL, 0 Refill(s).          *omega-3 polyunsaturated fatty acids 1000 mg oral capsule: 1,000 mg, 1 cap(s), po, daily, 0 Refill(s).          oxyCODONE 5 mg oral capsule: 5 mg, 1 cap(s), po, q12 hrs, 30 cap(s), 0 Refill(s).          *RisperDAL 1 mg oral tablet: See Instructions, 2mg qam, 1mg in afternoon and 1mg at HS., 0 Refill(s).          *Incruse Ellipta 62.5 mcg/inh inhalation powder: 62.5 mcg, inh, q 24 hrs, 0 Refill(s).       Problem list:    All Problems  Alcoholic hepatitis / SNOMED CT 908737750 / Confirmed  Anemia / SNOMED CT 075123581 / Confirmed  Anxiety / SNOMED CT 1077895053 / Confirmed  Dysphagia / SNOMED CT 758868548 / Confirmed  Cerebral edema / SNOMED CT 6709166 / Confirmed  COPD (chronic obstructive pulmonary disease) / SNOMED CT 06544523 / Confirmed  Chronic pain / SNOMED CT 851252220 / Confirmed  CVA (cerebrovascular accident) / SNOMED CT 990076532 / Confirmed  Diabetes mellitus / SNOMED CT 960994811 / Confirmed  Left foot drop / SNOMED CT 94545384 / Confirmed  Headache / SNOMED CT 0643709690 / Confirmed  Tachycardia / SNOMED CT 4597628368 / Confirmed  Hyperglycemia / SNOMED CT 790739958 / Confirmed  Cognitive impairment / SNOMED CT 5187252683 / Confirmed  Left hemiparesis / SNOMED CT 695927047 / Confirmed  Left homonymous hemianopsia / SNOMED CT 37419142 / Confirmed  Hypotension / SNOMED CT 808633353 / Confirmed  Major depressive disorder / SNOMED CT 4383228078 / Confirmed  Cervical cancer / SNOMED CT 888258427 / Confirmed  Meralgia paraesthetica / SNOMED CT 425176089 / Confirmed  Neuropathic pain / SNOMED CT 681327725 / Confirmed  Neuropathy / SNOMED CT 1102122403 / Confirmed  Urinary retention / SNOMED CT 3908089044 / Confirmed  Thrombocytopenia / SNOMED CT 5694358596 / Confirmed  Seizure / SNOMED CT 096135452 / Confirmed  Tobacco user /  SNOMED CT 201131533 / Probable  Vitamin D deficiency / SNOMED CT 61753697 / Confirmed  Vocal cord paralysis / SNOMED CT 999493096 / Confirmed      Histories   Past Medical History:    Active  Cervical cancer (327357577): Onset on 8/3/2015 at 60 years.  CVA (cerebrovascular accident) (470989040): Onset on 4/28/2015 at 59 years.  Left foot drop (41332729): Onset on 4/20/2015 at 59 years.  Neuropathy (0854047735): Onset on 3/20/2015 at 59 years.  Comments:  11/30/2016 CST 3:50 PM CST - Kitty Shook  Pain of lower extremity.  Urinary retention (3332022380): Onset on 3/12/2015 at 59 years.  Meralgia paraesthetica (559668239): Onset on 6/21/2014 at 59 years.  Chronic pain (167334392): Onset on 5/27/2014 at 58 years.  Cerebral edema (8763175): Onset on 1/10/2014 at 58 years.  Headache (5707319300): Onset on 1/10/2014 at 58 years.  Tachycardia (3576756284): Onset on 8/6/2012 at 57 years.  Seizure (059065300): Onset on 5/30/2011 at 56 years.  Cognitive impairment (4722480427): Onset on 10/17/2010 at 55 years.  Left homonymous hemianopsia (03051259): Onset on 10/17/2010 at 55 years.  Dysphagia (065720455): Onset on 10/17/2010 at 55 years.  Vitamin D deficiency (04985003): Onset on 10/14/2010 at 55 years.  Vocal cord paralysis (349189831): Onset on 10/12/2010 at 55 years.  Hyperglycemia (878386259): Onset on 9/30/2010 at 55 years.  Major depressive disorder (6819882034): Onset on 10/11/2007 at 52 years.  Left hemiparesis (002080692)  Hypotension (833801769)  Thrombocytopenia (8372208313)  Anemia (760046974)  Alcoholic hepatitis (183792236)  COPD (chronic obstructive pulmonary disease) (13992679)  Diabetes mellitus (958528797)  Comments:  4/13/2018 CDT 10:19 AM CDT - Scarlett Isaac  BP Goal: <130/80  Anxiety (3932194866)  Neuropathic pain (286892340)   Family History:    Diabetes mellitus  Grandmother (M)  Grandfather (M)  Aunt (M)  Breast cancer  Mother (Carmen): onset at 40 .  Aunt (M)  Sister: onset at 30 .  Heart  disease  Grandfather (M)     Procedure history:    [facility] BSO - Total abdominal hysterectomy and bilateral salpingo-oophorectomy (SNOMED CT 2014841702) on 2012 at 57 Years.   delivery (SNOMED CT 5987004075) in  at 26 Years.  Hysteroscopy (SNOMED CT 344986837).   Social History:        Tobacco Assessment: Current            Current every day smoker, Cigarettes, Total pack years: 20.                     Comments:                      2016 - Kitty Shook                     1/2 pack per day.      Substance Abuse Assessment: Denies Substance Abuse      Home and Environment Assessment            Marital status: .        Physical Examination   VS/Measurements   General:  Alert and oriented, No acute distress.    Neck:  Supple.    Respiratory:  Lungs are clear to auscultation, Respirations are non-labored.    Cardiovascular:  Normal rate, Regular rhythm.    Gastrointestinal:  Soft.    Musculoskeletal:  No tenderness, No swelling.    Integumentary:  No rash.    Neurologic:  Alert, Oriented, right sided hemiplegia.       Impression and Plan   Diagnosis     Chronic pain (UHA06-IC G89.29).     Course:  Unchanged.    Plan:  no change.    Patient Instructions:       Counseled: Patient, Regarding diagnosis, Regarding treatment, Regarding medications.

## 2022-02-16 NOTE — PROGRESS NOTES
Patient:   NICK TRUONG            MRN: [id number]            FIN: [id number]               Age:   62 years     Sex:  Female     :  1955   Associated Diagnoses:   Chronic pain   Author:   Stanford Gayle MD      Visit Information      Date of Service: 2018 03:26 pm  Performing Location: [facility]  Encounter#: [id number]      Primary Care Provider (PCP):  Stanford Gayle MD    NPI# [id number]      Referring Provider:  Stanford Gayle MD, NPI# [id number]      Chief Complaint   2018 3:32 PM CST    Increased pain.        History of Present Illness   Neuropathic pain worse   On gabapentin   No other new sxs  Both upper and lowe r extremity complaints         Review of Systems   Constitutional:  Negative except as documented in history of present illness.    Respiratory:  Negative.    Cardiovascular:  Negative.    Gastrointestinal:  Negative.    Genitourinary:  Negative.    Musculoskeletal:  Negative except as documented in history of present illness.    Integumentary:  Negative.    Neurologic:  Negative except as documented in history of present illness.              Health Status   Allergies:    Allergic Reactions (Selected)  Severity Not Documented  TraZODone (Anxiety)   Medications:  (Selected)   Prescriptions  Prescribed  Cymbalta 30 mg oral delayed release capsule: 1 cap(s) ( 30 mg ), po, daily, # 30 cap(s), 0 Refill(s), Type: Maintenance  oxyCODONE 5 mg oral capsule: 1 cap(s) ( 5 mg ), po, q12 hrs, # 30 cap(s), 0 Refill(s), Type: Maintenance  Documented Medications  Documented  Dulcolax Laxative: ( 5 mg ), po, daily, 0 Refill(s), Type: Maintenance  Incruse Ellipta 62.5 mcg/inh inhalation powder: ( 62.5 mcg ), inh, q 24 hrs, 0 Refill(s), Type: Maintenance  Milk of Magnesia: po, hs, 0 Refill(s), Type: Maintenance  OxyCONTIN 10 mg oral tablet, extended release: 1 tab(s) ( 10 mg ), PO, q12hr, 0 Refill(s), Type: Maintenance  RisperDAL 1 mg oral tablet: See Instructions,  Instructions: 2mg qam, 1mg in afternoon and 1mg at HS., 0 Refill(s), Type: Maintenance  Tylenol 500 mg oral tablet: 1 tab(s) ( 500 mg ), PO, q4hr, PRN: for pain, 0 Refill(s), Type: Maintenance  Ventolin HFA 90 mcg/inh inhalation aerosol: 2 puff(s), inh, q4 hrs, PRN: as needed for wheezing, 0 Refill(s), Type: Maintenance  aspirin 81 mg oral tablet: 1 tab(s) ( 81 mg ), po, hs, 0 Refill(s), Type: Maintenance  bisacodyl: ( 5 mg ), po, daily, 0 Refill(s), Type: Maintenance  cholecalciferol 1000 intl units oral capsule: See Instructions, Instructions: 1 cap(s) po bid, 0 Refill(s), Type: Maintenance  clonazePAM 0.5 mg oral tablet: 0.5 tab(s) ( 0.25 mg ), po, qam, Instructions: and 1 tab(s) at hs, 0 Refill(s), Type: Maintenance  gabapentin 600 mg oral tablet: 1 tab(s) ( 600 mg ), PO, TID, # 270 tab(s), 0 Refill(s), Type: Maintenance  lidocaine 5% topical film: See Instructions, Instructions: 1 patch(es) to painful area of skin for up to 12hrs within a 24hr period, 0 Refill(s), Type: Maintenance  omega-3 polyunsaturated fatty acids 1000 mg oral capsule: 1 cap(s) ( 1,000 mg ), po, daily, 0 Refill(s), Type: Maintenance,    Medications          *denotes recorded medication          Cymbalta 30 mg oral delayed release capsule: 30 mg, 1 cap(s), po, daily, 30 cap(s), 0 Refill(s).          *Tylenol 500 mg oral tablet: 500 mg, 1 tab(s), PO, q4hr, PRN: for pain, 0 Refill(s).          *Ventolin HFA 90 mcg/inh inhalation aerosol: 2 puff(s), inh, q4 hrs, PRN: as needed for wheezing, 0 Refill(s).          *aspirin 81 mg oral tablet: 81 mg, 1 tab(s), po, hs, 0 Refill(s).          *bisacodyl: 5 mg, po, daily, 0 Refill(s).          *Dulcolax Laxative: 5 mg, po, daily, 0 Refill(s).          *cholecalciferol 1000 intl units oral capsule: See Instructions, 1 cap(s) po bid, 0 Refill(s).          *clonazePAM 0.5 mg oral tablet: 0.25 mg, 0.5 tab(s), po, qam, and 1 tab(s) at hs, 0 Refill(s).          *gabapentin 600 mg oral tablet: 600 mg, 1  tab(s), PO, TID, 270 tab(s), 0 Refill(s).          *lidocaine 5% topical film: See Instructions, 1 patch(es) to painful area of skin for up to 12hrs within a 24hr period, 0 Refill(s).          *Milk of Magnesia: po, hs, 0 Refill(s).          *omega-3 polyunsaturated fatty acids 1000 mg oral capsule: 1,000 mg, 1 cap(s), po, daily, 0 Refill(s).          *OxyCONTIN 10 mg oral tablet, extended release: 10 mg, 1 tab(s), PO, q12hr, 0 Refill(s).          oxyCODONE 5 mg oral capsule: 5 mg, 1 cap(s), po, q12 hrs, 30 cap(s), 0 Refill(s).          *RisperDAL 1 mg oral tablet: See Instructions, 2mg qam, 1mg in afternoon and 1mg at HS., 0 Refill(s).          *Incruse Ellipta 62.5 mcg/inh inhalation powder: 62.5 mcg, inh, q 24 hrs, 0 Refill(s).     Problem list:    All Problems  Alcoholic hepatitis / SNOMED CT 575488898 / Confirmed  Anemia / SNOMED CT 642302889 / Confirmed  Anxiety / SNOMED CT 5011924993 / Confirmed  Dysphagia / SNOMED CT 571573946 / Confirmed  Cerebral edema / SNOMED CT 5679972 / Confirmed  COPD (chronic obstructive pulmonary disease) / SNOMED CT 22462768 / Confirmed  Chronic pain / SNOMED CT 463153231 / Confirmed  CVA (cerebrovascular accident) / SNOMED CT 390247355 / Confirmed  Diabetes mellitus / SNOMED CT 247921484 / Confirmed  Left foot drop / SNOMED CT 21033993 / Confirmed  Headache / SNOMED CT 7503510976 / Confirmed  Tachycardia / SNOMED CT 7295629997 / Confirmed  Hyperglycemia / SNOMED CT 016950346 / Confirmed  Cognitive impairment / SNOMED CT 1860118766 / Confirmed  Left hemiparesis / SNOMED CT 976452832 / Confirmed  Left homonymous hemianopsia / SNOMED CT 81469293 / Confirmed  Hypotension / SNOMED CT 386437487 / Confirmed  Major depressive disorder / SNOMED CT 3567997383 / Confirmed  Cervical cancer / SNOMED CT 474881397 / Confirmed  Meralgia paraesthetica / SNOMED CT 679470527 / Confirmed  Neuropathic pain / SNOMED CT 758678237 / Confirmed  Neuropathy / SNOMED CT 1712005867 / Confirmed  Urinary  retention / SNOMED CT 7205801095 / Confirmed  Thrombocytopenia / SNOMED CT 5316567448 / Confirmed  Seizure / SNOMED CT 905758887 / Confirmed  Tobacco user / SNOMED CT 205013074 / Probable  Vitamin D deficiency / SNOMED CT 47107480 / Confirmed  Vocal cord paralysis / SNOMED CT 181533574 / Confirmed      Histories   Past Medical History:    Active  Cervical cancer (414221685): Onset on 8/3/2015 at 60 years.  CVA (cerebrovascular accident) (554570143): Onset on 4/28/2015 at 59 years.  Left foot drop (90447136): Onset on 4/20/2015 at 59 years.  Neuropathy (0904682632): Onset on 3/20/2015 at 59 years.  Comments:  11/30/2016 CST 3:50 PM CST - Kitty Shook  Pain of lower extremity.  Urinary retention (3675003249): Onset on 3/12/2015 at 59 years.  Meralgia paraesthetica (674617259): Onset on 6/21/2014 at 59 years.  Chronic pain (294610956): Onset on 5/27/2014 at 58 years.  Cerebral edema (7883254): Onset on 1/10/2014 at 58 years.  Headache (5782195629): Onset on 1/10/2014 at 58 years.  Tachycardia (7856599017): Onset on 8/6/2012 at 57 years.  Seizure (773941056): Onset on 5/30/2011 at 56 years.  Cognitive impairment (8013923197): Onset on 10/17/2010 at 55 years.  Left homonymous hemianopsia (58123269): Onset on 10/17/2010 at 55 years.  Dysphagia (394972410): Onset on 10/17/2010 at 55 years.  Vitamin D deficiency (03671180): Onset on 10/14/2010 at 55 years.  Vocal cord paralysis (762830674): Onset on 10/12/2010 at 55 years.  Hyperglycemia (828376817): Onset on 9/30/2010 at 55 years.  Major depressive disorder (8772573043): Onset on 10/11/2007 at 52 years.  Left hemiparesis (419312535)  Hypotension (381186713)  Thrombocytopenia (9111844920)  Anemia (103440396)  Alcoholic hepatitis (684051788)  COPD (chronic obstructive pulmonary disease) (05695378)  Diabetes mellitus (453601620)  Anxiety (0914200766)  Neuropathic pain (442804640)   Family History:    Diabetes mellitus  Grandmother (M)  Grandfather (M)  Aunt (M)  Breast  cancer  Mother (Carmen): onset at 40 .  Aunt (M)  Sister: onset at 30 .  Heart disease  Grandfather (M)     Procedure history:    [facility]SO - Total abdominal hysterectomy and bilateral salpingo-oophorectomy (SNOMED CT 9070550832) on 2012 at 57 Years.   delivery (SNOMED CT 7854704115) in  at 26 Years.  Hysteroscopy (SNOMED CT 313755583).   Social History:        Tobacco Assessment: Current            Current every day smoker, Cigarettes, Total pack years: 20.                     Comments:                      2016 - Kitty Shook                     1/2 pack per day.      Substance Abuse Assessment: Denies Substance Abuse      Home and Environment Assessment            Marital status: .        Physical Examination   Vital Signs   2018 3:32 PM CST Temperature Tympanic 97.9 DegF    Peripheral Pulse Rate 91 bpm    HR Method Electronic    Systolic Blood Pressure 102 mmHg    Diastolic Blood Pressure 73 mmHg    Mean Arterial Pressure 83 mmHg    BP Method Electronic    Vital Signs Comments Weight per kinnic      Measurements from flowsheet : Measurements   2018 3:32 PM CST    Weight Measured - Standard                154.40 lb     General:  Alert and oriented, No acute distress.    Musculoskeletal:  No tenderness, No swelling.    Integumentary:  No rash.    Neurologic:  Alert, Oriented, Cranial Nerves II-XII are grossly intact.       Impression and Plan   Diagnosis     Chronic pain (XZL36-TP G89.29).     Course:  Worsening.    Plan:  dc effexor start cymbalta.    Patient Instructions:       Counseled: Patient, Regarding diagnosis, Regarding treatment, Regarding medications.

## 2022-02-16 NOTE — TELEPHONE ENCOUNTER
---------------------  From: Karla Kidd CMA   To: [facility] Message Pool ([id number] - [place]);     Sent: 4/5/2019 4:28:01 PM CDT  Subject: Pain Meds update     PCP:   [id number]      Time of Call:  1542       Person Calling:  Carmen, did not identify relationship to patient  Phone number:  [phone]    Note:   Caller states she contacted patients pain clinic about medications and was informed as of 1/29/19 they are no longer in control of patients pain medication. Was informed to see PCP about this.    Last office visit and reason:  4/5/19 Chronic Pain w/ TFS---------------------  From: Scarlett Isaac ([facility] Message Pool ([id number]_[facility]))   To: Care Coordinators Pool ([facility] [place]);     Sent: 4/5/2019 5:04:15 PM CDT  Subject: FW: Pain Meds update     Sidra-  Can we chat about this next week.  I have other recommendations from [facility] as well.  Will need to try and coordinate her pain management between us, pain clinic and [facility].  [facility] wants her to go to pain clinic.  It seems her pain clinic contract may have been violated (one of the rounding [facility] must have written her a script for oxycodone therefore terminating her agreement with her current pain clinic).  I have her recent med list from [facility], and it was quite different from our list...---------------------  From: [place] CMA (Care Coordinators Pool ([facility] [place]))   To: Sidra Venegas CMA;     Sent: 4/8/2019 10:55:47 AM CDT  Subject: FW: Pain Meds updateFemale caller called again to make sure that [facility] knew that patient was not getting pain meds through the pain clinic as of January 29th 2019.  No name of caller or relationship to patient.  Number listed was same as the one on this message though.noted.

## 2022-02-16 NOTE — PROGRESS NOTES
Patient with complicated medical history here with a ingrown great toe on the right side.  This is affecting the medial aspect of the toe.  This is been going on for couple days.  We discussed options and she would like to have a partial nail avulsion done.  Informed consent was obtained.  Digital block using 5 cc of 2% lidocaine without epinephrine was performed.  Area was cleansed with iodine.  Good capillary refill, sensation, and pedal pulses were verified.  Partial nail avulsion was performed with no complications.  She tolerated this well.  She will keep her foot elevated and ice it over the next 24 hours.  She will continue dressing changes until any losing has stopped.

## 2022-02-16 NOTE — PROGRESS NOTES
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    Respiratory:  Negative.    Cardiovascular:  Negative.    Gastrointestinal:  Negative.    Genitourinary:  Negative.    Musculoskeletal:  Negative except as documented in history of present illness.    Integumentary:  Negative.    Neurologic:  Negative except as documented in history of present illness.              Health Status   Allergies:    Allergic Reactions (Selected)  Severity Not Documented  TraZODone (Anxiety)   Medications:  (Selected)   Prescriptions  Prescribed  Cymbalta 30 mg oral delayed release capsule: = 2 cap(s) ( 60 mg ), po, bid, # 360 cap(s), 3 Refill(s), Type: Maintenance  Luminas Pain Relief Patch: Luminas Pain Relief Patch, See Instructions, Instructions: Apply 1 patch every 24 hours, Supply, # 1 box(es), 11 Refill(s), Type: Maintenance  clonazePAM 0.125 mg oral tablet, disintegratin tab(s) ( 0.125 mg ), Oral, bid, # 60 tab(s), 0 Refill(s), Type: Maintenance  lidocaine 5% topical film: 1 patch(es), Topical, daily, # 30 patch(es), 11 Refill(s), Type:  Maintenance, Pharmacy: [facility] , 1 patch(es) Topical daily, Weight Measured  oxyCODONE 5 mg oral tablet: = 1 tab(s) ( 5 mg ), Oral, hs, PRN: as needed for pain, # 15 tab(s), 0 Refill(s), Type: Maintenance  Documented Medications  Documented  Dulcolax Laxative: = 2 tab(s), po, daily, 0 Refill(s), Type: Maintenance  Incruse Ellipta 62.5 mcg/inh inhalation powder: ( 62.5 mcg ), inh, q 24 hrs, 0 Refill(s), Type: Maintenance  MiraLax oral powder for reconstitution: ( 17 gm ), Oral, every other day, 0 Refill(s), Type: Maintenance  RisperDAL 1 mg oral tablet: = 1 tab(s) ( 1 mg ), Oral, daily, Instructions: 1mg BID and 0.5mg at HS, 0 Refill(s), Type: Maintenance  Ventolin HFA 90 mcg/inh inhalation aerosol: 2 puff(s), inh, q4 hrs, PRN: as needed for wheezing, 0 Refill(s), Type: Maintenance  acetaminophen 500 mg oral tablet: = 2 tab(s) ( 1,000 mg ), Oral, bid, Instructions: and 2 tabs prn q 6 hours for pain 6-10, 1 tab prn  for pain 1-5, PRN: as needed for pain, 0 Refill(s), Type: Maintenance  aspirin 81 mg oral tablet: 1 tab(s) ( 81 mg ), po, hs, 0 Refill(s), Type: Maintenance  gabapentin 300 mg oral capsule: = 3 cap(s) ( 900 mg ), Oral, hs, Instructions: takes 600mg Qam and 600mg at lunch and 900mg at HS, 0 Refill(s), Type: Maintenance   Problem list:    All Problems (Selected)  Left homonymous hemianopsia / SNOMED CT 58433248 / Confirmed  Vitamin D deficiency / SNOMED CT 44753629 / Confirmed  Hypotension / SNOMED CT 815419947 / Confirmed  Dysphagia / SNOMED CT 927368112 / Confirmed  Cerebral edema / SNOMED CT 7300431 / Confirmed  Vocal cord paralysis / SNOMED CT 185514012 / Confirmed  Left hemiparesis / SNOMED CT 577012751 / Confirmed  Anemia / SNOMED CT 897510344 / Confirmed  Neuropathic pain / SNOMED CT 981739459 / Confirmed  Alcoholic hepatitis / SNOMED CT 550028362 / Confirmed  CVA (cerebrovascular accident) / SNOMED CT 044050828 / Confirmed  Anxiety / SNOMED CT 4138002366 / Confirmed  Thrombocytopenia /  SNOMED CT 6882925067 / Confirmed  COPD (chronic obstructive pulmonary disease) / SNOMED CT 08250183 / Confirmed  Urinary retention / SNOMED CT 5746996439 / Confirmed  Tobacco user / SNOMED CT 334612480 / Probable  Cognitive impairment / SNOMED CT 6242551333 / Confirmed  Neuropathy / SNOMED CT 8524211199 / Confirmed  Meralgia paraesthetica / SNOMED CT 386997093 / Confirmed  Chronic pain / SNOMED CT 381591644 / Confirmed  Hyperglycemia / SNOMED CT 505679217 / Confirmed  Tachycardia / SNOMED CT 7398950745 / Confirmed  Headache / SNOMED CT 0647366260 / Confirmed  Diabetes mellitus / SNOMED CT 250130096 / Confirmed  Major depressive disorder / SNOMED CT 0462925925 / Confirmed  Left foot drop / SNOMED CT 58320761 / Confirmed      Histories   Past Medical History:    Active  Alcoholic hepatitis (021110903): Onset in 2016 at 61 years.  CVA (cerebrovascular accident) (863417763): Onset on 4/28/2015 at 59 years.  Left foot drop (86822054): Onset on 4/20/2015 at 59 years.  Neuropathy (2629280300): Onset on 3/20/2015 at 59 years.  Comments:  11/30/2016 CST 3:50 PM CST - Kitty Shook  Pain of lower extremity.  Urinary retention (0705456537): Onset on 3/12/2015 at 59 years.  Meralgia paraesthetica (160724663): Onset on 6/21/2014 at 59 years.  Chronic pain (699007313): Onset on 5/27/2014 at 58 years.  Cerebral edema (0354806): Onset on 1/10/2014 at 58 years.  Headache (6492361869): Onset on 1/10/2014 at 58 years.  Tachycardia (8441021456): Onset on 8/6/2012 at 57 years.  Cognitive impairment (8800901994): Onset on 10/17/2010 at 55 years.  Left homonymous hemianopsia (68953244): Onset on 10/17/2010 at 55 years.  Dysphagia (757112919): Onset on 10/17/2010 at 55 years.  Vitamin D deficiency (79659075): Onset on 10/14/2010 at 55 years.  Vocal cord paralysis (807159278): Onset on 10/12/2010 at 55 years.  Hyperglycemia (333784277): Onset on 9/30/2010 at 55 years.  Major depressive disorder (1739802940): Onset on 10/11/2007 at 52  years.  Left hemiparesis (774356324)  Hypotension (016610411)  Thrombocytopenia (3835553425)  Anemia (882921664)  COPD (chronic obstructive pulmonary disease) (40135564)  Diabetes mellitus (574139527)  Comments:  2018 CDT 10:19 AM CDT - Dereck Scarlett HUFFMAN  BP Goal: <130/80  Anxiety (2686429002)  Neuropathic pain (074946604)  Resolved  Cervical cancer (327600526): Onset on 8/3/2015 at 60 years.  Resolved.   Family History:    Diabetes mellitus  Grandmother (M)  Grandfather (M)  Aunt (M)  Breast cancer  Mother (Carmen): onset at 40 .  Aunt (M)  Sister: onset at 30 .  Heart disease  Grandfather (M)     Procedure history:    [facility] - Total abdominal hysterectomy and bilateral salpingo-oophorectomy (SNOMED CT 0544279755) on 2012 at 57 Years.  Radiation (SNOMED CT 152940744) in  at 56 Years.  Comments:  2019 10:53 AM CST - Kitty Shook  Radiation treatment for cervical cancer.   delivery (SNOMED CT 7740279343) in  at 26 Years.  Hysteroscopy (SNOMED CT 212875263).   Social History:        Tobacco Assessment: Current            Current every day smoker, Cigarettes, Total pack years: 20.                     Comments:                      2016 - Kitty Shook                     1/2 pack per day.      Substance Abuse Assessment: Denies Substance Abuse      Employment and Education Assessment            Disability      Home and Environment Assessment            Marital status: .        Physical Examination   VS/Measurements   General:  Alert and oriented, No acute distress.    Neurologic:  Alert, Oriented, right sided hemiplegia.       Impression and Plan   Diagnosis     Chronic pain (NHY83-RX G89.29).     COPD (chronic obstructive pulmonary disease) (NDH80-MN J44.9).     CVA (cerebrovascular accident) (NOE67-KB I63.9).     Left hemiparesis (UKU63-YH G81.94).     Left homonymous hemianopsia (JMB48-AA H53.462).     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     Course:   Unchanged.    Plan:  1.  History of CVA with left-sided hemiparesis struggles with her chronic pain     2.  History of seizure coated with her stroke     3.  History of alcohol hepatitis #4 COPD is as needed Ventolin     4.  Chronic pain since her stroke she has lidocaine patches as needed oxycodone gabapentin duloxetine     5.  Depression on bupropion     6.  Mood disorder on risperidone  .    Patient Instructions:       Counseled: Patient, Regarding diagnosis, Regarding treatment, Regarding medications.

## 2022-02-16 NOTE — PROGRESS NOTES
Patient:   NICK TRUONG            MRN: [id number]            FIN: [id number]               Age:   64 years     Sex:  Female     :  1955   Associated Diagnoses:   Bacterial pneumonia   Author:   Stanford Gayle MD      Visit Information      Date of Service: 2019 10:23 am  Performing Location: [facility]  Encounter#: [id number]      Primary Care Provider (PCP):  Stanford Gayle MD    NPI# [id number]      Referring Provider:  Stanford Gayle MD, NPI# [id number]      Chief Complaint   12/3/2019 10:30 AM CST   Possible pneumonia per [facility]      History of Present Illness   Patient is in today for possible pneumonia.  She lives in long-term care.  She had a significant stroke with left-sided hemiparesis.  She is chronic pain.  She had increasing congestion and cough over the last few days.  Oxygen saturations have been 85 to 92 L of oxygen.  No fevers.  No other new complaints         Review of Systems   Ear/Nose/Mouth/Throat:  Negative except as documented in history of present illness.    Respiratory:  Negative except as documented in history of present illness.    Cardiovascular:  Negative.    Gastrointestinal:  Negative.    Neurologic:  Negative except as documented in history of present illness.       Health Status   Allergies:    Allergic Reactions (Selected)  Severity Not Documented  TraZODone (Anxiety)   Medications:  (Selected)   Prescriptions  Prescribed  Cymbalta 30 mg oral delayed release capsule: = 2 cap(s) ( 60 mg ), po, bid, # 360 cap(s), 3 Refill(s), Type: Maintenance  Luminas Pain Relief Patch: Luminas Pain Relief Patch, See Instructions, Instructions: Apply 1 patch every 24 hours, Supply, # 1 box(es), 11 Refill(s), Type: Maintenance  clonazePAM 0.125 mg oral tablet, disintegratin tab(s) ( 0.125 mg ), Oral, bid, # 60 tab(s), 0 Refill(s), Type: Maintenance  lidocaine 5% topical film: 1 patch(es), Topical, daily, # 30 patch(es), 11 Refill(s), Type:  Maintenance  oxyCODONE 5 mg oral capsule: 1 cap(s) ( 5 mg ), po, q12 hrs, # 30 cap(s), 0 Refill(s), Type: Maintenance  Documented Medications  Documented  Dulcolax Laxative: = 2 tab(s), po, daily, 0 Refill(s), Type: Maintenance  Incruse Ellipta 62.5 mcg/inh inhalation powder: ( 62.5 mcg ), inh, q 24 hrs, 0 Refill(s), Type: Maintenance  MiraLax oral powder for reconstitution: ( 17 gm ), Oral, every other day, 0 Refill(s), Type: Maintenance  RisperDAL 1 mg oral tablet: = 1 tab(s) ( 1 mg ), Oral, daily, Instructions: 1mg BID and 0.5mg at HS, 0 Refill(s), Type: Maintenance  Ventolin HFA 90 mcg/inh inhalation aerosol: 2 puff(s), inh, q4 hrs, PRN: as needed for wheezing, 0 Refill(s), Type: Maintenance  acetaminophen 500 mg oral tablet: = 2 tab(s) ( 1,000 mg ), Oral, bid, Instructions: and 2 tabs prn q 6 hours for pain 6-10, 1 tab prn  for pain 1-5, PRN: as needed for pain, 0 Refill(s), Type: Maintenance  aspirin 81 mg oral tablet: 1 tab(s) ( 81 mg ), po, hs, 0 Refill(s), Type: Maintenance  gabapentin 300 mg oral capsule: = 3 cap(s) ( 900 mg ), Oral, hs, Instructions: takes 600mg Qam and 600mg at lunch and 900mg at HS, 0 Refill(s), Type: Maintenance  oxyCODONE 5 mg oral tablet: See Instructions, Instructions: Q8 HRS PRN (also has BID scheduled dose), 0 Refill(s), Type: Maintenance,    Medications          *denotes recorded medication          Cymbalta 30 mg oral delayed release capsule: 60 mg, 2 cap(s), po, bid, 360 cap(s), 3 Refill(s).          Luminas Pain Relief Patch: See Instructions, Apply 1 patch every 24 hours, 1 box(es), 11 Refill(s).          *acetaminophen 500 mg oral tablet: 1,000 mg, 2 tab(s), Oral, bid, and 2 tabs prn q 6 hours for pain 6-10, 1 tab prn  for pain 1-5, PRN: as needed for pain, 0 Refill(s).          *Ventolin HFA 90 mcg/inh inhalation aerosol: 2 puff(s), inh, q4 hrs, PRN: as needed for wheezing, 0 Refill(s).          *aspirin 81 mg oral tablet: 81 mg, 1 tab(s), po, hs, 0 Refill(s).           *Dulcolax Laxative: 2 tab(s), po, daily, 0 Refill(s).          clonazePAM 0.125 mg oral tablet, disintegratin.125 mg, 1 tab(s), Oral, bid, 60 tab(s), 0 Refill(s).          *gabapentin 300 mg oral capsule: 900 mg, 3 cap(s), Oral, hs, takes 600mg Qam and 600mg at lunch and 900mg at HS, 0 Refill(s).          lidocaine 5% topical film: 1 patch(es), Topical, daily, 30 patch(es), 11 Refill(s).          oxyCODONE 5 mg oral capsule: 5 mg, 1 cap(s), po, q12 hrs, 30 cap(s), 0 Refill(s).          *oxyCODONE 5 mg oral tablet: See Instructions, Q8 HRS PRN (also has BID scheduled dose), 0 Refill(s).          *MiraLax oral powder for reconstitution: 17 gm, Oral, every other day, 0 Refill(s).          *RisperDAL 1 mg oral tablet: 1 mg, 1 tab(s), Oral, daily, 1mg BID and 0.5mg at HS, 0 Refill(s).          *Incruse Ellipta 62.5 mcg/inh inhalation powder: 62.5 mcg, inh, q 24 hrs, 0 Refill(s).       Problem list:    All Problems (Selected)  Alcoholic hepatitis / SNOMED CT 592689582 / Confirmed  Anemia / SNOMED CT 513848791 / Confirmed  Anxiety / SNOMED CT 7148830548 / Confirmed  Dysphagia / SNOMED CT 145928952 / Confirmed  Cerebral edema / SNOMED CT 5533444 / Confirmed  COPD (chronic obstructive pulmonary disease) / SNOMED CT 08112200 / Confirmed  Chronic pain / SNOMED CT 567553974 / Confirmed  CVA (cerebrovascular accident) / SNOMED CT 648107689 / Confirmed  Diabetes mellitus / SNOMED CT 888402675 / Confirmed  Left foot drop / SNOMED CT 72636605 / Confirmed  Headache / SNOMED CT 2978443290 / Confirmed  Tachycardia / SNOMED CT 5412175571 / Confirmed  Hyperglycemia / SNOMED CT 555380103 / Confirmed  Cognitive impairment / SNOMED CT 2360728407 / Confirmed  Left hemiparesis / SNOMED CT 558437648 / Confirmed  Left homonymous hemianopsia / SNOMED CT 20735245 / Confirmed  Hypotension / SNOMED CT 089078846 / Confirmed  Major depressive disorder / SNOMED CT 0749695546 / Confirmed  Meralgia paraesthetica / SNOMED CT 977262784 /  Confirmed  Neuropathic pain / SNOMED CT 933185392 / Confirmed  Neuropathy / SNOMED CT 1882823866 / Confirmed  Urinary retention / SNOMED CT 3137205437 / Confirmed  Thrombocytopenia / SNOMED CT 4673337406 / Confirmed  Tobacco user / SNOMED CT 474348511 / Probable  Vitamin D deficiency / SNOMED CT 72426022 / Confirmed  Vocal cord paralysis / SNOMED CT 728500102 / Confirmed      Histories   Past Medical History:    Active  Alcoholic hepatitis (415202355): Onset in 2016 at 61 years.  CVA (cerebrovascular accident) (241434054): Onset on 4/28/2015 at 59 years.  Left foot drop (61181557): Onset on 4/20/2015 at 59 years.  Neuropathy (9938418351): Onset on 3/20/2015 at 59 years.  Comments:  11/30/2016 CST 3:50 PM CST - Kitty Shook  Pain of lower extremity.  Urinary retention (7846497933): Onset on 3/12/2015 at 59 years.  Meralgia paraesthetica (115357252): Onset on 6/21/2014 at 59 years.  Chronic pain (341830836): Onset on 5/27/2014 at 58 years.  Cerebral edema (3995354): Onset on 1/10/2014 at 58 years.  Headache (7917285943): Onset on 1/10/2014 at 58 years.  Tachycardia (1733535882): Onset on 8/6/2012 at 57 years.  Cognitive impairment (2731674065): Onset on 10/17/2010 at 55 years.  Left homonymous hemianopsia (18203153): Onset on 10/17/2010 at 55 years.  Dysphagia (423187552): Onset on 10/17/2010 at 55 years.  Vitamin D deficiency (29258918): Onset on 10/14/2010 at 55 years.  Vocal cord paralysis (110493297): Onset on 10/12/2010 at 55 years.  Hyperglycemia (363916275): Onset on 9/30/2010 at 55 years.  Major depressive disorder (2459235582): Onset on 10/11/2007 at 52 years.  Left hemiparesis (356758204)  Hypotension (636056973)  Thrombocytopenia (2022038736)  Anemia (543132504)  COPD (chronic obstructive pulmonary disease) (58135142)  Diabetes mellitus (326213805)  Comments:  4/13/2018 CDT 10:19 AM CDT - Scarlett Isaac  BP Goal: <130/80  Anxiety (4613441446)  Neuropathic pain (846565076)  Resolved  Cervical cancer  (631175562): Onset on 8/3/2015 at 60 years.  Resolved.   Family History:    Diabetes mellitus  Grandmother (M)  Grandfather (M)  Aunt (M)  Breast cancer  Mother (Carmen): onset at 40 .  Aunt (M)  Sister: onset at 30 .  Heart disease  Grandfather (M)     Procedure history:    [facility] - Total abdominal hysterectomy and bilateral salpingo-oophorectomy (SNOMED CT 2632350098) on 2012 at 57 Years.  Radiation (SNOMED CT 056805649) in  at 56 Years.  Comments:  2019 10:53 AM CST - Kitty Shook  Radiation treatment for cervical cancer.   delivery (SNOMED CT 1472144664) in  at 26 Years.  Hysteroscopy (SNOMED CT 829862302).   Social History:        Tobacco Assessment: Current            Current every day smoker, Cigarettes, Total pack years: 20.                     Comments:                      2016 - Kitty Shook                     1/2 pack per day.      Substance Abuse Assessment: Denies Substance Abuse      Employment and Education Assessment            Disability      Home and Environment Assessment            Marital status: .        Physical Examination   Vital Signs   12/3/2019 10:30 AM CST Temperature Tympanic 97.4 DegF  LOW    Peripheral Pulse Rate 114 bpm  HI    Systolic Blood Pressure 102 mmHg    Diastolic Blood Pressure 71 mmHg    Mean Arterial Pressure 81 mmHg      General:  Alert and oriented, No acute distress.    Eye:  Normal conjunctiva.    HENT:  Tympanic membranes are clear, No pharyngeal erythema.    Neck:  Supple, Non-tender, No lymphadenopathy.    Respiratory:       Breath sounds: Left, Base, Crackles present.    Cardiovascular:  Regular rhythm.    Gastrointestinal:  Soft, Non-tender.    Musculoskeletal:  No tenderness.    Integumentary:  No rash.    Neurologic:  Alert, Oriented.       Review / Management   Radiology results   Reveals no acute disease process      Impression and Plan   Diagnosis     Bacterial pneumonia (XIQ89-GX J15.9).     Course:  Not  progressing as expected.    Plan:  Patient with pneumonia by exam.  labs are pending.  Will treat with Levaquin.  Recheck in a day or 2 if not improving sooner if worse   .    Patient Instructions:       Counseled: Patient, Regarding diagnosis, Regarding treatment, Regarding medications.

## 2022-02-16 NOTE — TELEPHONE ENCOUNTER
---------------------  From: Sidra Venegas CMA   Sent: 4/19/2019 3:55:30 PM CDT  Subject: Pain meds.     CC contacted the [facility] and reviewed the patient's medication list.  Updated clinic chart to reflect what they are giving her.  LMTCB with Carmen, who had called the clinic regarding Anna's pain medications.  No release is on file will talk to her about this if she is to be the spokesperson for Anna.      [facility] really wants pt to re-establish at a pain clinic for pain management.    CSA was violated at [facility].  They will see patient but will not Rx opiates.  Wondering if pt would be willing to go [facility] ([phone]) can place referral if they agree.    Sidra Venegas CMA.Spoke with Carmen for over 20 minutes.  They are willing to have Anna go to [facility].  She will make sure BREEZY is signed for us and for all of her records.  Carmen would like to be contacted to schedule any appointments.  Her home number is [phone] or Cell: [phone].  Her home address if anything needs to be mailed is Carmen Mathew, [address] [place] [address].    Referral was placed for [facility] for pain management per TFS.    Sidra Venegas CMA.Spoke with [facility], they are not excepting patients that are not in the [place] [place] [facility], [facility] or [facility] system. Will see what TFS recommends when returns to clinic.  Sidra Venegas CMA

## 2022-02-16 NOTE — PROGRESS NOTES
Patient:   NICK TRUONG            MRN: [id number]            FIN: [id number]               Age:   64 years     Sex:  Female     :  1955   Associated Diagnoses:   Chronic pain   Author:   Stanford Gayle MD      Visit Information      Date of Service: 01/10/2020 08:30 am  Performing Location: [facility]  Encounter#: [id number]      Primary Care Provider (PCP):  Stanford Gayle MD    NPI# [id number]      Referring Provider:  No referring provider recorded for selected visit.      Chief Complaint      History of Present Illness   Neuropathic pain   right sided continues   On gabapentin   No other new sxs  Both upper and lower extremity complaints  cymbalta as well         Review of Systems   Constitutional:  Negative except as documented in history of present illness.    Respiratory:  Negative.    Cardiovascular:  Negative.    Gastrointestinal:  Negative.    Genitourinary:  Negative.    Musculoskeletal:  Negative except as documented in history of present illness.    Integumentary:  Negative.    Neurologic:  Negative except as documented in history of present illness.              Health Status   Allergies:    Allergic Reactions (Selected)  Severity Not Documented  TraZODone (Anxiety)   Medications:  (Selected)   Prescriptions  Prescribed  Cymbalta 30 mg oral delayed release capsule: = 2 cap(s) ( 60 mg ), po, bid, # 360 cap(s), 3 Refill(s), Type: Maintenance  Luminas Pain Relief Patch: Luminas Pain Relief Patch, See Instructions, Instructions: Apply 1 patch every 24 hours, Supply, # 1 box(es), 11 Refill(s), Type: Maintenance  clonazePAM 0.125 mg oral tablet, disintegratin tab(s) ( 0.125 mg ), Oral, bid, # 60 tab(s), 0 Refill(s), Type: Maintenance  lidocaine 5% topical film: 1 patch(es), Topical, daily, # 30 patch(es), 11 Refill(s), Type: Maintenance  oxyCODONE 5 mg oral capsule: 1 cap(s) ( 5 mg ), po, q12 hrs, # 30 cap(s), 0 Refill(s), Type: Maintenance  Documented  Medications  Documented  Dulcolax Laxative: = 2 tab(s), po, daily, 0 Refill(s), Type: Maintenance  Incruse Ellipta 62.5 mcg/inh inhalation powder: ( 62.5 mcg ), inh, q 24 hrs, 0 Refill(s), Type: Maintenance  MiraLax oral powder for reconstitution: ( 17 gm ), Oral, every other day, 0 Refill(s), Type: Maintenance  RisperDAL 1 mg oral tablet: = 1 tab(s) ( 1 mg ), Oral, daily, Instructions: 1mg BID and 0.5mg at HS, 0 Refill(s), Type: Maintenance  Ventolin HFA 90 mcg/inh inhalation aerosol: 2 puff(s), inh, q4 hrs, PRN: as needed for wheezing, 0 Refill(s), Type: Maintenance  acetaminophen 500 mg oral tablet: = 2 tab(s) ( 1,000 mg ), Oral, bid, Instructions: and 2 tabs prn q 6 hours for pain 6-10, 1 tab prn  for pain 1-5, PRN: as needed for pain, 0 Refill(s), Type: Maintenance  aspirin 81 mg oral tablet: 1 tab(s) ( 81 mg ), po, hs, 0 Refill(s), Type: Maintenance  gabapentin 300 mg oral capsule: = 3 cap(s) ( 900 mg ), Oral, hs, Instructions: takes 600mg Qam and 600mg at lunch and 900mg at HS, 0 Refill(s), Type: Maintenance  oxyCODONE 5 mg oral tablet: See Instructions, Instructions: Q8 HRS PRN (also has BID scheduled dose), 0 Refill(s), Type: Maintenance,    Medications          *denotes recorded medication          Cymbalta 30 mg oral delayed release capsule: 60 mg, 2 cap(s), po, bid, 360 cap(s), 3 Refill(s).          Luminas Pain Relief Patch: See Instructions, Apply 1 patch every 24 hours, 1 box(es), 11 Refill(s).          *acetaminophen 500 mg oral tablet: 1,000 mg, 2 tab(s), Oral, bid, and 2 tabs prn q 6 hours for pain 6-10, 1 tab prn  for pain 1-5, PRN: as needed for pain, 0 Refill(s).          *Ventolin HFA 90 mcg/inh inhalation aerosol: 2 puff(s), inh, q4 hrs, PRN: as needed for wheezing, 0 Refill(s).          *aspirin 81 mg oral tablet: 81 mg, 1 tab(s), po, hs, 0 Refill(s).          *Dulcolax Laxative: 2 tab(s), po, daily, 0 Refill(s).          clonazePAM 0.125 mg oral tablet, disintegratin.125 mg, 1 tab(s),  Oral, bid, 60 tab(s), 0 Refill(s).          *gabapentin 300 mg oral capsule: 900 mg, 3 cap(s), Oral, hs, takes 600mg Qam and 600mg at lunch and 900mg at HS, 0 Refill(s).          lidocaine 5% topical film: 1 patch(es), Topical, daily, 30 patch(es), 11 Refill(s).          oxyCODONE 5 mg oral capsule: 5 mg, 1 cap(s), po, q12 hrs, 30 cap(s), 0 Refill(s).          *oxyCODONE 5 mg oral tablet: See Instructions, Q8 HRS PRN (also has BID scheduled dose), 0 Refill(s).          *MiraLax oral powder for reconstitution: 17 gm, Oral, every other day, 0 Refill(s).          *RisperDAL 1 mg oral tablet: 1 mg, 1 tab(s), Oral, daily, 1mg BID and 0.5mg at HS, 0 Refill(s).          *Incruse Ellipta 62.5 mcg/inh inhalation powder: 62.5 mcg, inh, q 24 hrs, 0 Refill(s).       Problem list:    All Problems  Alcoholic hepatitis / SNOMED CT 950597809 / Confirmed  Anemia / SNOMED CT 699154588 / Confirmed  Anxiety / SNOMED CT 3535118046 / Confirmed  Dysphagia / SNOMED CT 327037288 / Confirmed  Cerebral edema / SNOMED CT 6123319 / Confirmed  COPD (chronic obstructive pulmonary disease) / SNOMED CT 66523847 / Confirmed  Chronic pain / SNOMED CT 199400250 / Confirmed  CVA (cerebrovascular accident) / SNOMED CT 798368335 / Confirmed  Diabetes mellitus / SNOMED CT 314356801 / Confirmed  Left foot drop / SNOMED CT 63665177 / Confirmed  Headache / SNOMED CT 4953214457 / Confirmed  Tachycardia / SNOMED CT 3400189693 / Confirmed  Hyperglycemia / SNOMED CT 198209809 / Confirmed  Cognitive impairment / SNOMED CT 7398640565 / Confirmed  Left hemiparesis / SNOMED CT 200014790 / Confirmed  Left homonymous hemianopsia / SNOMED CT 27439543 / Confirmed  Hypotension / SNOMED CT 604848221 / Confirmed  Major depressive disorder / SNOMED CT 2796155428 / Confirmed  Meralgia paraesthetica / SNOMED CT 554650139 / Confirmed  Neuropathic pain / SNOMED CT 955102206 / Confirmed  Neuropathy / SNOMED CT 4565849249 / Confirmed  Urinary retention / SNOMED CT 5141163282 /  Confirmed  Thrombocytopenia / SNOMED CT 9259440573 / Confirmed  Tobacco user / SNOMED CT 460192124 / Probable  Vitamin D deficiency / SNOMED CT 02918955 / Confirmed  Vocal cord paralysis / SNOMED CT 452099632 / Confirmed  Inactive: Seizure / SNOMED CT 792032080  Resolved: Cervical cancer / SNOMED CT 298707285      Histories   Past Medical History:    Active  Alcoholic hepatitis (560564862): Onset in 2016 at 61 years.  CVA (cerebrovascular accident) (135425154): Onset on 4/28/2015 at 59 years.  Left foot drop (27485875): Onset on 4/20/2015 at 59 years.  Neuropathy (3303081734): Onset on 3/20/2015 at 59 years.  Comments:  11/30/2016 CST 3:50 PM CST - Kitty Shook  Pain of lower extremity.  Urinary retention (6015487246): Onset on 3/12/2015 at 59 years.  Meralgia paraesthetica (184644710): Onset on 6/21/2014 at 59 years.  Chronic pain (767495145): Onset on 5/27/2014 at 58 years.  Cerebral edema (7064358): Onset on 1/10/2014 at 58 years.  Headache (1276304249): Onset on 1/10/2014 at 58 years.  Tachycardia (1346912102): Onset on 8/6/2012 at 57 years.  Cognitive impairment (6485670808): Onset on 10/17/2010 at 55 years.  Left homonymous hemianopsia (33624836): Onset on 10/17/2010 at 55 years.  Dysphagia (938369080): Onset on 10/17/2010 at 55 years.  Vitamin D deficiency (73892353): Onset on 10/14/2010 at 55 years.  Vocal cord paralysis (846308472): Onset on 10/12/2010 at 55 years.  Hyperglycemia (216511201): Onset on 9/30/2010 at 55 years.  Major depressive disorder (5991334102): Onset on 10/11/2007 at 52 years.  Left hemiparesis (631480339)  Hypotension (392350459)  Thrombocytopenia (9496444044)  Anemia (648107161)  COPD (chronic obstructive pulmonary disease) (68971129)  Diabetes mellitus (624560014)  Comments:  4/13/2018 CDT 10:19 AM CDT - Scarlett Isaac  BP Goal: <130/80  Anxiety (3119446252)  Neuropathic pain (290856322)  Resolved  Cervical cancer (840144806): Onset on 8/3/2015 at 60 years.  Resolved.   Family  History:    Diabetes mellitus  Grandmother (M)  Grandfather (M)  Aunt (M)  Breast cancer  Mother (Carmen): onset at 40 .  Aunt (M)  Sister: onset at 30 .  Heart disease  Grandfather (M)     Procedure history:    [facility] - Total abdominal hysterectomy and bilateral salpingo-oophorectomy (SNOMED CT 4354767099) on 2012 at 57 Years.  Radiation (SNOMED CT 989860407) in  at 56 Years.  Comments:  2019 10:53 AM CST - Kitty Shook  Radiation treatment for cervical cancer.   delivery (SNOMED CT 6123481428) in  at 26 Years.  Hysteroscopy (SNOMED CT 113643872).   Social History:        Tobacco Assessment: Current            Current every day smoker, Cigarettes, Total pack years: 20.                     Comments:                      2016 - Kitty Shook                     1/2 pack per day.      Substance Abuse Assessment: Denies Substance Abuse      Employment and Education Assessment            Disability      Home and Environment Assessment            Marital status: .        Physical Examination   VS/Measurements   General:  Alert and oriented, No acute distress.    Respiratory:  Respirations are non-labored.    Neurologic:  Alert, Oriented, right sided hemiplegia.       Impression and Plan   Diagnosis     Chronic pain (UML21-FM G89.29).     Course:  Unchanged.    Plan:  slow taper narcotics.    Patient Instructions:       Counseled: Patient, Regarding diagnosis, Regarding treatment, Regarding medications.

## 2022-02-16 NOTE — PROGRESS NOTES
Patient:   NICK TRUONG            MRN: [id number]            FIN: [id number]               Age:   66 years     Sex:  Female     :  1955   Associated Diagnoses:   Cognitive impairment; Left hemiparesis; Major depressive disorder   Author:   Stanford Gayle MD      Visit Information      Date of Service: 2021 06:36 am  Performing Location: [facility]  Encounter#: [id number]      Primary Care Provider (PCP):  Stanford Gayle MD    NPI# [id number]      Referring Provider:  Stanford Gayle MD    NPI# [id number]      History of Present Illness   Neuropathic pain   right sided continues   On gabapentin and cymbalta  No other new sxs  Both upper and lower extremity complaints         Review of Systems   Constitutional:  Negative except as documented in history of present illness.    Musculoskeletal:  Negative except as documented in history of present illness.    Neurologic:  Negative except as documented in history of present illness.       Health Status   Allergies:    Allergic Reactions (Selected)  Severity Not Documented  TraZODone (Anxiety)   Medications:  (Selected)   Prescriptions  Prescribed  Cymbalta 30 mg oral delayed release capsule: = 2 cap(s) ( 60 mg ), po, bid, # 360 cap(s), 3 Refill(s), Type: Maintenance  Luminas Pain Relief Patch: Luminas Pain Relief Patch, See Instructions, Instructions: Apply 1 patch every 24 hours, Supply, # 1 box(es), 11 Refill(s), Type: Maintenance  clonazePAM 0.125 mg oral tablet, disintegratin tab(s) ( 0.125 mg ), Oral, bid, # 60 tab(s), 0 Refill(s), Type: Maintenance  lidocaine 5% topical film: 1 patch(es), Topical, daily, # 30 patch(es), 11 Refill(s), Type: Maintenance, Pharmacy: [facility] , 1 patch(es) Topical daily, 166.5, lb, 19 13:27:00 CDT, Weight Measured  oxyCODONE 5 mg oral tablet: = 1 tab(s) ( 5 mg ), Oral, hs, PRN: as needed for pain, # 30 tab(s), 0 Refill(s), Type: Maintenance, Pharmacy: [facility]  [place], 1 tab(s) Oral hs,PRN:as needed for pain  Documented Medications  Documented  Dulcolax Laxative: = 2 tab(s), po, daily, 0 Refill(s), Type: Maintenance  Incruse Ellipta 62.5 mcg/inh inhalation powder: ( 62.5 mcg ), inh, q 24 hrs, 0 Refill(s), Type: Maintenance  MiraLax oral powder for reconstitution: ( 17 gm ), Oral, every other day, 0 Refill(s), Type: Maintenance  RisperDAL 1 mg oral tablet: = 1 tab(s) ( 1 mg ), Oral, daily, Instructions: 1mg BID and 0.5mg at HS, 0 Refill(s), Type: Maintenance  Ventolin HFA 90 mcg/inh inhalation aerosol: 2 puff(s), inh, q4 hrs, PRN: as needed for wheezing, 0 Refill(s), Type: Maintenance  acetaminophen 500 mg oral tablet: = 2 tab(s) ( 1,000 mg ), Oral, bid, Instructions: and 2 tabs prn q 6 hours for pain 6-10, 1 tab prn  for pain 1-5, PRN: as needed for pain, 0 Refill(s), Type: Maintenance  aspirin 81 mg oral tablet: 1 tab(s) ( 81 mg ), po, hs, 0 Refill(s), Type: Maintenance  gabapentin 300 mg oral capsule: = 3 cap(s) ( 900 mg ), Oral, hs, Instructions: takes 600mg Qam and 600mg at lunch and 900mg at HS, 0 Refill(s), Type: Maintenance   Problem list:    All Problems (Selected)  CVA (cerebrovascular accident) / SNOMED CT 685338322 / Confirmed  Left hemiparesis / SNOMED CT 146285610 / Confirmed  Hypotension / SNOMED CT 010064485 / Confirmed  Thrombocytopenia / SNOMED CT 6333794597 / Confirmed  Anemia / SNOMED CT 330524448 / Confirmed  Alcoholic hepatitis / SNOMED CT 437098445 / Confirmed  COPD (chronic obstructive pulmonary disease) / SNOMED CT 56371269 / Confirmed  Diabetes mellitus / SNOMED CT 038633569 / Confirmed  Tobacco user / SNOMED CT 232924548 / Probable  Left foot drop / SNOMED CT 97421834 / Confirmed  Neuropathy / SNOMED CT 9845446563 / Confirmed  Anxiety / SNOMED CT 1257742898 / Confirmed  Major depressive disorder / SNOMED CT 8972295530 / Confirmed  Hyperglycemia / SNOMED CT 972489051 / Confirmed  Vocal cord paralysis / SNOMED CT 248750669 / Confirmed  Vitamin D  deficiency / SNOMED CT 58145005 / Confirmed  Cognitive impairment / SNOMED CT 0124783006 / Confirmed  Left homonymous hemianopsia / SNOMED CT 36569727 / Confirmed  Dysphagia / SNOMED CT 776930363 / Confirmed  Tachycardia / SNOMED CT 6954366713 / Confirmed  Cerebral edema / SNOMED CT 2445888 / Confirmed  Headache / SNOMED CT 4642823039 / Confirmed  Chronic pain / SNOMED CT 739465920 / Confirmed  Meralgia paraesthetica / SNOMED CT 306532602 / Confirmed  Urinary retention / SNOMED CT 3713576601 / Confirmed  Neuropathic pain / SNOMED CT 962757175 / Confirmed  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  CVA (cerebrovascular accident) (685213774): Onset on 4/28/2015 at 59 years.  Left foot drop (86170717): Onset on 4/20/2015 at 59 years.  Neuropathy (4055192129): Onset on 3/20/2015 at 59 years.  Comments:  11/30/2016 CST 3:50 PM CST - Kitty Shook  Pain of lower extremity.  Urinary retention (3194133400): Onset on 3/12/2015 at 59 years.  Meralgia paraesthetica (179018378): Onset on 6/21/2014 at 59 years.  Chronic pain (639653513): Onset on 5/27/2014 at 58 years.  Cerebral edema (9291612): Onset on 1/10/2014 at 58 years.  Headache (3231687037): Onset on 1/10/2014 at 58 years.  Tachycardia (5075662646): Onset on 8/6/2012 at 57 years.  Cognitive impairment (2731044498): Onset on 10/17/2010 at 55 years.  Left homonymous hemianopsia (27204303): Onset on 10/17/2010 at 55 years.  Dysphagia (999978292): Onset on 10/17/2010 at 55 years.  Vitamin D deficiency (90768017): Onset on 10/14/2010 at 55 years.  Vocal cord paralysis (073007574): Onset on 10/12/2010 at 55 years.  Hyperglycemia (062035422): Onset on 9/30/2010 at 55 years.  Major depressive disorder (3807591369): Onset on 10/11/2007 at 52 years.  Left hemiparesis (913804565)  Hypotension (525270951)  Thrombocytopenia (3495785666)  Anemia  (421471653)  COPD (chronic obstructive pulmonary disease) (84893136)  Diabetes mellitus (864517154)  Comments:  2018 CDT 10:19 AM CDT - Dereck Scarlett HUFFMAN  BP Goal: <130/80  Anxiety (2165479787)  Neuropathic pain (135757430)  Resolved  Cervical cancer (441321340): Onset on 8/3/2015 at 60 years.  Resolved.   Family History:    Diabetes mellitus  Grandmother (M)  Grandfather (M)  Aunt (M)  Breast cancer  Mother (Carmen): onset at 40 .  Aunt (M)  Sister: onset at 30 .  Heart disease  Grandfather (M)     Procedure history:    [facility] - Total abdominal hysterectomy and bilateral salpingo-oophorectomy (SNOMED CT 0525151626) on 2012 at 57 Years.  Radiation (SNOMED CT 961461071) in  at 56 Years.  Comments:  2019 10:53 AM CST - Kitty Shook  Radiation treatment for cervical cancer.   delivery (SNOMED CT 9995500661) in  at 26 Years.  Hysteroscopy (SNOMED CT 658320040).   Social History:        Tobacco Assessment: Current            Current every day smoker, Cigarettes, Total pack years: 20.                     Comments:                      2016 - Kitty Shook                     1/2 pack per day.      Substance Abuse Assessment: Denies Substance Abuse      Employment and Education Assessment            Disability      Home and Environment Assessment            Marital status: .        Physical Examination   Documented vital signs:  Within normal limits.    Neurologic:  Alert.    Psychiatric:  Cooperative, Appropriate mood & affect.       Impression and Plan   Diagnosis     Cognitive impairment (AKX14-CJ R41.89).     Left hemiparesis (IDK18-UB G81.94).     Major depressive disorder (CPW82-EW F32.9).     Plan:  no change in cares  .

## 2022-02-16 NOTE — PROGRESS NOTES
Patient:   NICK TRUONG            MRN: [id number]            FIN: [id number]               Age:   64 years     Sex:  Female     :  1955   Associated Diagnoses:   Chronic pain   Author:   Stanford Gayle MD      Visit Information      Date of Service: 2019 01:20 pm  Performing Location: [facility]  Encounter#: [id number]      Primary Care Provider (PCP):  Stanford Gayle MD    NPI# [id number]      Referring Provider:  Stanford Gayle MD# [id number]      Chief Complaint   2019 1:27 PM CDT     pain per kinni        History of Present Illness   Patient is in for follow-up on her chronic pain she is chronic pain since her stroke.  She is struggled with different medications for pain control.  She has been to a pain clinic in the past.  Pain clinic has stopped seeing her because providers here had provided her with oxycodone outside her pain contract.  She has been on tramadol and Butrans without success did not like the way they made her feel         Review of Systems   Constitutional:  Negative except as documented in history of present illness.    Musculoskeletal:  Negative except as documented in history of present illness.    Neurologic:  Negative except as documented in history of present illness.              Health Status   Allergies:    Allergic Reactions (Selected)  Severity Not Documented  TraZODone (Anxiety)   Medications:  (Selected)   Prescriptions  Prescribed  Cymbalta 30 mg oral delayed release capsule: = 1 cap(s) ( 30 mg ), po, bid, # 30 cap(s), 0 Refill(s), Type: Maintenance  clonazePAM 0.125 mg oral tablet, disintegratin tab(s) ( 0.125 mg ), Oral, bid, # 60 tab(s), 0 Refill(s), Type: Maintenance  oxyCODONE 5 mg oral capsule: 1 cap(s) ( 5 mg ), po, q12 hrs, # 30 cap(s), 0 Refill(s), Type: Maintenance  Documented Medications  Documented  Dulcolax Laxative: = 2 tab(s), po, daily, 0 Refill(s), Type: Maintenance  Incruse Ellipta 62.5 mcg/inh inhalation  powder: ( 62.5 mcg ), inh, q 24 hrs, 0 Refill(s), Type: Maintenance  MiraLax oral powder for reconstitution: ( 17 gm ), Oral, every other day, 0 Refill(s), Type: Maintenance  RisperDAL 1 mg oral tablet: = 1 tab(s) ( 1 mg ), Oral, daily, Instructions: 1mg BID and 0.5mg at HS, 0 Refill(s), Type: Maintenance  Ventolin HFA 90 mcg/inh inhalation aerosol: 2 puff(s), inh, q4 hrs, PRN: as needed for wheezing, 0 Refill(s), Type: Maintenance  acetaminophen 500 mg oral tablet: = 2 tab(s) ( 1,000 mg ), Oral, bid, Instructions: and 2 tabs prn q 6 hours for pain 6-10, 1 tab prn  for pain 1-5, PRN: as needed for pain, 0 Refill(s), Type: Maintenance  aspirin 81 mg oral tablet: 1 tab(s) ( 81 mg ), po, hs, 0 Refill(s), Type: Maintenance  gabapentin 300 mg oral capsule: = 3 cap(s) ( 900 mg ), Oral, hs, Instructions: takes 600mg Qam and 600mg at lunch and 900mg at HS, 0 Refill(s), Type: Maintenance  lidocaine 5% topical film: See Instructions, Instructions: 1 patch(es) to painful area of skin for up to 12hrs within a 24hr period, 0 Refill(s), Type: Maintenance  oxyCODONE 5 mg oral tablet: See Instructions, Instructions: Q8 HRS PRN (also has BID scheduled dose), 0 Refill(s), Type: Maintenance,    Medications          *denotes recorded medication          Cymbalta 30 mg oral delayed release capsule: 30 mg, 1 cap(s), po, bid, 30 cap(s), 0 Refill(s).          *acetaminophen 500 mg oral tablet: 1,000 mg, 2 tab(s), Oral, bid, and 2 tabs prn q 6 hours for pain 6-10, 1 tab prn  for pain 1-5, PRN: as needed for pain, 0 Refill(s).          *Ventolin HFA 90 mcg/inh inhalation aerosol: 2 puff(s), inh, q4 hrs, PRN: as needed for wheezing, 0 Refill(s).          *aspirin 81 mg oral tablet: 81 mg, 1 tab(s), po, hs, 0 Refill(s).          *Dulcolax Laxative: 2 tab(s), po, daily, 0 Refill(s).          clonazePAM 0.125 mg oral tablet, disintegratin.125 mg, 1 tab(s), Oral, bid, 60 tab(s), 0 Refill(s).          *gabapentin 300 mg oral capsule: 900 mg, 3  cap(s), Oral, hs, takes 600mg Qam and 600mg at lunch and 900mg at HS, 0 Refill(s).          *lidocaine 5% topical film: See Instructions, 1 patch(es) to painful area of skin for up to 12hrs within a 24hr period, 0 Refill(s).          oxyCODONE 5 mg oral capsule: 5 mg, 1 cap(s), po, q12 hrs, 30 cap(s), 0 Refill(s).          *oxyCODONE 5 mg oral tablet: See Instructions, Q8 HRS PRN (also has BID scheduled dose), 0 Refill(s).          *MiraLax oral powder for reconstitution: 17 gm, Oral, every other day, 0 Refill(s).          *RisperDAL 1 mg oral tablet: 1 mg, 1 tab(s), Oral, daily, 1mg BID and 0.5mg at HS, 0 Refill(s).          *Incruse Ellipta 62.5 mcg/inh inhalation powder: 62.5 mcg, inh, q 24 hrs, 0 Refill(s).       Problem list:    All Problems  Alcoholic hepatitis / SNOMED CT 732376540 / Confirmed  Anemia / SNOMED CT 206174272 / Confirmed  Anxiety / SNOMED CT 8241933345 / Confirmed  Dysphagia / SNOMED CT 670736482 / Confirmed  Cerebral edema / SNOMED CT 3429052 / Confirmed  COPD (chronic obstructive pulmonary disease) / SNOMED CT 15402149 / Confirmed  Chronic pain / SNOMED CT 626471488 / Confirmed  CVA (cerebrovascular accident) / SNOMED CT 064147246 / Confirmed  Diabetes mellitus / SNOMED CT 989221676 / Confirmed  Left foot drop / SNOMED CT 95226822 / Confirmed  Headache / SNOMED CT 8379439870 / Confirmed  Tachycardia / SNOMED CT 2546371468 / Confirmed  Hyperglycemia / SNOMED CT 416862382 / Confirmed  Cognitive impairment / SNOMED CT 2482218626 / Confirmed  Left hemiparesis / SNOMED CT 583288439 / Confirmed  Left homonymous hemianopsia / SNOMED CT 67462602 / Confirmed  Hypotension / SNOMED CT 929664826 / Confirmed  Major depressive disorder / SNOMED CT 9858238013 / Confirmed  Meralgia paraesthetica / SNOMED CT 646263481 / Confirmed  Neuropathic pain / SNOMED CT 302038145 / Confirmed  Neuropathy / SNOMED CT 4370347238 / Confirmed  Urinary retention / SNOMED CT 5899167512 / Confirmed  Thrombocytopenia / SNOMED CT  8743559975 / Confirmed  Tobacco user / SNOMED CT 627130578 / Probable  Vitamin D deficiency / SNOMED CT 55153530 / Confirmed  Vocal cord paralysis / SNOMED CT 538799040 / Confirmed  Inactive: Seizure / SNOMED CT 899420622  Resolved: Cervical cancer / SNOMED CT 719493841      Histories   Past Medical History:    Active  Alcoholic hepatitis (785268649): Onset in 2016 at 61 years.  CVA (cerebrovascular accident) (408044666): Onset on 4/28/2015 at 59 years.  Left foot drop (80460932): Onset on 4/20/2015 at 59 years.  Neuropathy (4715547205): Onset on 3/20/2015 at 59 years.  Comments:  11/30/2016 CST 3:50 PM CST - Kitty Shook  Pain of lower extremity.  Urinary retention (9589715389): Onset on 3/12/2015 at 59 years.  Meralgia paraesthetica (205795978): Onset on 6/21/2014 at 59 years.  Chronic pain (772201904): Onset on 5/27/2014 at 58 years.  Cerebral edema (4445941): Onset on 1/10/2014 at 58 years.  Headache (6083503701): Onset on 1/10/2014 at 58 years.  Tachycardia (4089881800): Onset on 8/6/2012 at 57 years.  Cognitive impairment (4475599913): Onset on 10/17/2010 at 55 years.  Left homonymous hemianopsia (90761620): Onset on 10/17/2010 at 55 years.  Dysphagia (889519275): Onset on 10/17/2010 at 55 years.  Vitamin D deficiency (48341810): Onset on 10/14/2010 at 55 years.  Vocal cord paralysis (425215170): Onset on 10/12/2010 at 55 years.  Hyperglycemia (158853140): Onset on 9/30/2010 at 55 years.  Major depressive disorder (5060623449): Onset on 10/11/2007 at 52 years.  Left hemiparesis (231605957)  Hypotension (730596355)  Thrombocytopenia (7957727372)  Anemia (632518657)  COPD (chronic obstructive pulmonary disease) (64998453)  Diabetes mellitus (985973108)  Comments:  4/13/2018 CDT 10:19 AM CDT - Scarlett Isaac  BP Goal: <130/80  Anxiety (2108040814)  Neuropathic pain (524344959)  Resolved  Cervical cancer (761634211): Onset on 8/3/2015 at 60 years.  Resolved.   Family History:    Diabetes mellitus  Grandmother  (M)  Grandfather (M)  Aunt (M)  Breast cancer  Mother (Carmen): onset at 40 .  Aunt (M)  Sister: onset at 30 .  Heart disease  Grandfather (M)     Procedure history:    [facility] - Total abdominal hysterectomy and bilateral salpingo-oophorectomy (SNOMED CT 9720317052) on 2012 at 57 Years.  Radiation (SNOMED CT 225186305) in  at 56 Years.  Comments:  2019 10:53 AM CST - Kitty Shook  Radiation treatment for cervical cancer.   delivery (SNOMED CT 0664209377) in  at 26 Years.  Hysteroscopy (SNOMED CT 203914256).   Social History:        Tobacco Assessment: Current            Current every day smoker, Cigarettes, Total pack years: 20.                     Comments:                      2016 - Kitty Shook                     1/2 pack per day.      Substance Abuse Assessment: Denies Substance Abuse      Employment and Education Assessment            Disability      Home and Environment Assessment            Marital status: .        Physical Examination   Vital Signs   2019 1:27 PM CDT Temperature Tympanic 97.9 DegF    Peripheral Pulse Rate 89 bpm    Systolic Blood Pressure 136 mmHg  HI    Diastolic Blood Pressure 83 mmHg  HI    Mean Arterial Pressure 101 mmHg    Vital Signs Comments weight per kinni 19      Measurements from flowsheet : Measurements   2019 1:27 PM CDT     Weight Measured - Standard                166.5 lb     General:  Alert and oriented, No acute distress.    Neurologic:  Alert, Oriented, right sided hemiplegia.       Impression and Plan   Diagnosis     Chronic pain (XZL52-LP G89.29).     Course:  Worsening.    Plan:  refer back  to pain clinic .    Patient Instructions:       Counseled: Patient, Regarding diagnosis, Regarding treatment, Regarding medications.

## 2022-02-16 NOTE — PROGRESS NOTES
"   Patient:   NICK TRUONG            MRN: [id number]            FIN: [id number]               Age:   62 years     Sex:  Female     :  1955   Associated Diagnoses:   Alcoholic hepatitis; Cognitive impairment; COPD (chronic obstructive pulmonary disease); CVA (cerebrovascular accident); Diabetes mellitus; Left hemiparesis; Left homonymous hemianopsia; Major depressive disorder   Author:   Stanford Gayle MD      Visit Information      Date of Service: 2017 01:20 pm  Performing Location: [facility]  Encounter#: [id number]      Primary Care Provider (PCP):  Stanford Gayle MD    NPI# [id number]      Referring Provider:  Stanford Gayle MD, NPI# [id number]      History of Present Illness   From  visit   \"The patient was admitted to the [facility] on 2016.  The patient is a 61-year-old female with a history of anxiety and cognitive impairment, past cervical cancer, meralgia paresthetica of the right side, left-sided weakness, past stroke, seizure disorder, and COPD.  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  She has also had an unintentional overdose of opiates.  She has chronic constipation.  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  She was admitted, it looks like, from [facility] where she had spent four days.  She was admitted there on  after being discharged from a TCU.  Apparently family was no longer able to provide the care she needed and  stated that she had exhausted her benefits in TCU and a long-term care facility would need to be sought, which is how she came here to the [facility]\"  No change since arrival  Still struggles with pain      Review of Systems             Health Status   Allergies:    Allergic Reactions (Selected)  Severity Not Documented  TraZODone (Anxiety)   Medications:  (Selected)   Documented Medications  Documented  Dulcolax Laxative: ( 5 mg ), po, daily, 0 Refill(s), Type: " Maintenance  Incruse Ellipta 62.5 mcg/inh inhalation powder: ( 62.5 mcg ), inh, q 24 hrs, 0 Refill(s), Type: Maintenance  Milk of Magnesia: po, hs, 0 Refill(s), Type: Maintenance  OxyCONTIN 10 mg oral tablet, extended release: 1 tab(s) ( 10 mg ), PO, q12hr, 0 Refill(s), Type: Maintenance  RisperDAL 1 mg oral tablet: 1 tab(s) ( 1 mg ), PO, BID, Instructions: 1 tablet po in the morning, 1 tablet at noon and 2 tablets at bedtime, # 60 tab(s), 0 Refill(s), Type: Maintenance  Tylenol 500 mg oral tablet: 1 tab(s) ( 500 mg ), PO, q4hr, PRN: for pain, 0 Refill(s), Type: Maintenance  Ventolin HFA 90 mcg/inh inhalation aerosol: 2 puff(s), inh, q4 hrs, PRN: as needed for wheezing, 0 Refill(s), Type: Maintenance  aspirin 81 mg oral tablet: 1 tab(s) ( 81 mg ), po, hs, 0 Refill(s), Type: Maintenance  bisacodyl: ( 5 mg ), po, daily, 0 Refill(s), Type: Maintenance  cholecalciferol 1000 intl units oral capsule: See Instructions, Instructions: 1 cap(s) po bid, 0 Refill(s), Type: Maintenance  clonazePAM 0.5 mg oral tablet: 0.5 tab(s) ( 0.25 mg ), po, qam, Instructions: and 1 tab(s) at hs, 0 Refill(s), Type: Maintenance  gabapentin 300 mg oral capsule: See Instructions, Instructions: 600-300-600, 0 Refill(s), Type: Maintenance  lidocaine 5% topical film: See Instructions, Instructions: 1 patch(es) to painful area of skin for up to 12hrs within a 24hr period, 0 Refill(s), Type: Maintenance  omega-3 polyunsaturated fatty acids 1000 mg oral capsule: 1 cap(s) ( 1,000 mg ), po, daily, 0 Refill(s), Type: Maintenance  oxyCODONE 5 mg oral capsule: 1 cap(s) ( 5 mg ), po, q6 hrs, Instructions: 1-2 tablets po every 4 hrs if needed for pain. Maximum of 2 tablets per day, 0 Refill(s), Type: Maintenance  venlafaxine 75 mg oral tablet: 2 tab(s) ( 150 mg ), po, daily, 0 Refill(s), Type: Maintenance   Problem list:    All Problems  Alcoholic hepatitis / SNOMED CT 968263643 / Confirmed  Anemia / SNOMED CT 792209311 / Confirmed  Anxiety / SNOMED CT  3958919903 / Confirmed  Dysphagia / SNOMED CT 201478746 / Confirmed  Cerebral edema / SNOMED CT 6671561 / Confirmed  COPD (chronic obstructive pulmonary disease) / SNOMED CT 39267091 / Confirmed  Chronic pain / SNOMED CT 855212239 / Confirmed  CVA (cerebrovascular accident) / SNOMED CT 190657603 / Confirmed  Diabetes mellitus / SNOMED CT 723753674 / Confirmed  Left foot drop / SNOMED CT 61148660 / Confirmed  Headache / SNOMED CT 5602582917 / Confirmed  Tachycardia / SNOMED CT 1055314798 / Confirmed  Hyperglycemia / SNOMED CT 340786059 / Confirmed  Cognitive impairment / SNOMED CT 0035628497 / Confirmed  Left hemiparesis / SNOMED CT 888196910 / Confirmed  Left homonymous hemianopsia / SNOMED CT 08865420 / Confirmed  Hypotension / SNOMED CT 345190714 / Confirmed  Major depressive disorder / SNOMED CT 0983814036 / Confirmed  Cervical cancer / SNOMED CT 016246886 / Confirmed  Meralgia paraesthetica / SNOMED CT 655443359 / Confirmed  Neuropathy / SNOMED CT 9750757994 / Confirmed  Urinary retention / SNOMED CT 6916535339 / Confirmed  Thrombocytopenia / SNOMED CT 4542066146 / Confirmed  Seizure / SNOMED CT 403395841 / Confirmed  Tobacco user / SNOMED CT 357953400 / Probable  Vitamin D deficiency / SNOMED CT 28110321 / Confirmed  Vocal cord paralysis / SNOMED CT 299004590 / Confirmed      Histories   Past Medical History:    Active  Cervical cancer (734091408): Onset on 8/3/2015 at 60 years.  CVA (cerebrovascular accident) (350116116): Onset on 4/28/2015 at 59 years.  Left foot drop (59259214): Onset on 4/20/2015 at 59 years.  Neuropathy (0735366034): Onset on 3/20/2015 at 59 years.  Comments:  11/30/2016 CST 3:50 PM CST - Kitty Shook  Pain of lower extremity.  Urinary retention (2813389519): Onset on 3/12/2015 at 59 years.  Meralgia paraesthetica (274453932): Onset on 6/21/2014 at 59 years.  Chronic pain (167788815): Onset on 5/27/2014 at 58 years.  Cerebral edema (3864339): Onset on 1/10/2014 at 58 years.  Headache  (0650155618): Onset on 1/10/2014 at 58 years.  Tachycardia (7859075060): Onset on 2012 at 57 years.  Seizure (323017323): Onset on 2011 at 56 years.  Cognitive impairment (6723723857): Onset on 10/17/2010 at 55 years.  Left homonymous hemianopsia (00501743): Onset on 10/17/2010 at 55 years.  Dysphagia (498688909): Onset on 10/17/2010 at 55 years.  Vitamin D deficiency (43742544): Onset on 10/14/2010 at 55 years.  Vocal cord paralysis (009330593): Onset on 10/12/2010 at 55 years.  Hyperglycemia (649014755): Onset on 2010 at 55 years.  Major depressive disorder (4067262968): Onset on 10/11/2007 at 52 years.  Left hemiparesis (721709503)  Hypotension (746754217)  Thrombocytopenia (4578925713)  Anemia (832895207)  Alcoholic hepatitis (118505002)  COPD (chronic obstructive pulmonary disease) (69834332)  Diabetes mellitus (469130034)  Anxiety (7725381198)   Family History:    Diabetes mellitus  Grandmother (M)  Grandfather (M)  Aunt (M)  Breast cancer  Mother (Carmen): onset at 40 .  Aunt (M)  Sister: onset at 30 .  Heart disease  Grandfather (M)     Procedure history:    [facility] - Total abdominal hysterectomy and bilateral salpingo-oophorectomy (SNOMED CT 9288135795) on 2012 at 57 Years.   delivery (SNOMED CT 7804719192) in  at 26 Years.  Hysteroscopy (SNOMED CT 072683013).   Social History:        Tobacco Assessment: Current            Current every day smoker, Cigarettes, Total pack years: 20.                     Comments:                      2016 - Kitty Shook                     1/2 pack per day.      Substance Abuse Assessment: Denies Substance Abuse      Home and Environment Assessment            Marital status: .        Physical Examination   Neck:  Supple, Non-tender.    Respiratory:  Lungs are clear to auscultation, Respirations are non-labored.    Cardiovascular:  Normal rate, Regular rhythm.    Gastrointestinal:  Soft.    Neurologic:  Alert, left sided hemiparesis.        Impression and Plan   Diagnosis     Alcoholic hepatitis (EKG59-ZJ K70.10).     Cognitive impairment (JTR39-DC R41.89).     COPD (chronic obstructive pulmonary disease) (GXU59-HM J44.9).     CVA (cerebrovascular accident) (NMW07-SG I63.9).     Diabetes mellitus (YOK85-AE E11.9).     Left hemiparesis (NUN00-OV G81.94).     Left homonymous hemianopsia (PTR08-MM H53.462).     Major depressive disorder (YSS95-OW F32.9).     Course:  Progressing as expected.    Plan:  fu 2 month.

## 2022-02-16 NOTE — TELEPHONE ENCOUNTER
---------------------From: Gifty Garg CMA (Phone Messages Pool ([address] [place] [address])) To: [facility]FS Message Pool ([address] [place] [address]);   Sent: 10/11/2019 11:18:14 AM CDTSubject: General Message-Lidoderm patch Phone MessagePCP:   TFS  Time of Call:  1034   Person Calling:  Mica Mathew (BREEZY on file)Phone number:  [phone], Ok LM Returned call at: 1100Note:   Mica calling asking if [facility] can refill pt's Lidocaine 5% patches.  States Dr Morrow in [place] was prescribing for her, but since he hasn't seen her in >1yr, unable to order.  Per [facility] script last written 7/9/2018 #90 apply 1 patch to painful area of skin for up to 12hrs within a 24hr period.  Talked with mica and she said she gets the script from [facility] since they aren't covered under her Medicare and [facility] unable to get them for her.  We did discuss that the new patches have also been ordered thru [facility] and she was appreciative of this.  She said she would like [facility] to have these on hand in case those new patches aren't working for her.  Please [id number] - [address] [address] [place] [address] () [phone]---------------------From: Scarlett Isaac ([facility] Message Pool ([address] [place] [address])) To: Stanford Gayle MD;   Sent: 10/11/2019 11:19:48 AM CDTSubject: FW: General Message-Lidoderm patch---------------------From: Stanford Gayle MD To: [employer] Message Pool ([address] [place] [address]);   Sent: 10/11/2019 11:58:22 AM CDTSubject: RE: General Message-Lidoderm patch ok to refill same # and directions 1 yrRx faxed to number listed below.  Confirmation received.

## 2022-02-16 NOTE — PROGRESS NOTES
NICK TRUONG :  1955  11/15/2019 MRN:  [id number]  [facility] Visit  Primary HCP: Stanford Gayle MD   S: The staff asked me to see this resident as she is due for routine rounds.  She continues to have chronic pain in her legs; otherwise, tells me that she is doing well.     Her diagnoses include     1) Hemiplegia following cerebrovascular accident  2) Anxiety  3) Major depressive disorder  4) Neuralgia and neuritis  5) Chronic pain  O: Blood pressure is 153/89.  Pulse is 81.  Respiratory rate 20.  Temperature 97.  Oxygen saturation on room air is 95%.  She is alert and speech is clear.     A: Neuralgia.     P: No changes at this time.    D:  11/15/2019  T:  2019 Evonne Hartley RN, C-NP/sq    c:  [facility]

## 2022-02-16 NOTE — PROGRESS NOTES
Patient:   NICK TRUONG            MRN: [id number]            FIN: [id number]               Age:   62 years     Sex:  Female     :  1955   Associated Diagnoses:   Chronic pain   Author:   Stanford Gayle MD      Visit Information      Date of Service: 2017 10:18 am  Performing Location: [facility]  Encounter#: [id number]      Primary Care Provider (PCP):  Stanford Gayle MD    NPI# [id number]      Referring Provider:  Stanford Gayle MD, NPI# [id number]      Chief Complaint   2017 10:25 AM CDT    Neuropathy throughout entire body.        History of Present Illness   Neuropathic pain worse last few months  On gabapentin  No other new sxs  Both upper and lowe r extremity complaints      Review of Systems   Constitutional:  Negative except as documented in history of present illness.    Respiratory:  Negative.    Cardiovascular:  Negative.    Gastrointestinal:  Negative.    Genitourinary:  Negative.    Musculoskeletal:  Negative except as documented in history of present illness.    Integumentary:  Negative.    Neurologic:  Negative except as documented in history of present illness.             Health Status   Allergies:    Allergic Reactions (Selected)  Severity Not Documented  TraZODone (Anxiety)   Medications:  (Selected)   Documented Medications  Documented  Incruse Ellipta 62.5 mcg/inh inhalation powder: ( 62.5 mcg ), inh, q 24 hrs, 0 Refill(s), Type: Maintenance  OxyCONTIN 10 mg oral tablet, extended release: 1 tab(s) ( 10 mg ), PO, q12hr, 0 Refill(s), Type: Maintenance  RisperDAL 1 mg oral tablet: 1 tab(s) ( 1 mg ), PO, BID, Instructions: 1 tablet po in the morning, 1 tablet at noon and 2 tablets at bedtime, # 60 tab(s), 0 Refill(s), Type: Maintenance  Tylenol 500 mg oral tablet: 1 tab(s) ( 500 mg ), PO, q4hr, PRN: for pain, 0 Refill(s), Type: Maintenance  Ventolin HFA 90 mcg/inh inhalation aerosol: 2 puff(s), inh, q4 hrs, PRN: as needed for wheezing, 0 Refill(s),  Type: Maintenance  aspirin 81 mg oral tablet: 1 tab(s) ( 81 mg ), po, hs, 0 Refill(s), Type: Maintenance  bisacodyl: ( 5 mg ), po, daily, 0 Refill(s), Type: Maintenance  cholecalciferol 1000 intl units oral capsule: See Instructions, Instructions: 1 cap(s) po bid, 0 Refill(s), Type: Maintenance  clonazePAM 0.5 mg oral tablet: 0.5 tab(s) ( 0.25 mg ), po, qam, Instructions: and 1 tab(s) at hs, 0 Refill(s), Type: Maintenance  gabapentin 300 mg oral capsule: 2 cap(s) ( 600 mg ), po, tid, 0 Refill(s), Type: Maintenance  lidocaine 5% topical film: See Instructions, Instructions: 1 patch(es) to painful area of skin for up to 12hrs within a 24hr period, 0 Refill(s), Type: Maintenance  omega-3 polyunsaturated fatty acids 1000 mg oral capsule: 1 cap(s) ( 1,000 mg ), po, daily, 0 Refill(s), Type: Maintenance  oxyCODONE 5 mg oral capsule: 1 cap(s) ( 5 mg ), po, q6 hrs, Instructions: 1-2 tablets po every 4 hrs if needed for pain. Maximum of 2 tablets per day, 0 Refill(s), Type: Maintenance  venlafaxine 75 mg oral tablet: 2 tab(s) ( 150 mg ), po, daily, 0 Refill(s), Type: Maintenance   Problem list:    All Problems  Alcoholic hepatitis / SNOMED CT 426555963 / Confirmed  Anemia / SNOMED CT 162874837 / Confirmed  Anxiety / SNOMED CT 6647649132 / Confirmed  Dysphagia / SNOMED CT 259091179 / Confirmed  Cerebral edema / SNOMED CT 7715076 / Confirmed  COPD (chronic obstructive pulmonary disease) / SNOMED CT 46121104 / Confirmed  Chronic pain / SNOMED CT 067117555 / Confirmed  CVA (cerebrovascular accident) / SNOMED CT 087559867 / Confirmed  Diabetes mellitus / SNOMED CT 706514758 / Confirmed  Left foot drop / SNOMED CT 34202987 / Confirmed  Headache / SNOMED CT 8564967772 / Confirmed  Tachycardia / SNOMED CT 4667860359 / Confirmed  Hyperglycemia / SNOMED CT 537495528 / Confirmed  Cognitive impairment / SNOMED CT 8088954993 / Confirmed  Left hemiparesis / SNOMED CT 580041548 / Confirmed  Left homonymous hemianopsia / SNOMED CT 16210750  / Confirmed  Hypotension / SNOMED CT 359247413 / Confirmed  Major depressive disorder / SNOMED CT 0018678978 / Confirmed  Cervical cancer / SNOMED CT 256994465 / Confirmed  Meralgia paraesthetica / SNOMED CT 322705829 / Confirmed  Neuropathy / SNOMED CT 7534549768 / Confirmed  Urinary retention / SNOMED CT 9640114990 / Confirmed  Thrombocytopenia / SNOMED CT 6443480662 / Confirmed  Seizure / SNOMED CT 316658155 / Confirmed  Tobacco user / SNOMED CT 216522610 / Probable  Vitamin D deficiency / SNOMED CT 05661215 / Confirmed  Vocal cord paralysis / SNOMED CT 992186156 / Confirmed      Histories   Past Medical History:    Active  Cervical cancer (564158443): Onset on 8/3/2015 at 60 years.  CVA (cerebrovascular accident) (213914402): Onset on 4/28/2015 at 59 years.  Left foot drop (45707484): Onset on 4/20/2015 at 59 years.  Neuropathy (5654927154): Onset on 3/20/2015 at 59 years.  Comments:  11/30/2016 CST 3:50 PM CST - Kitty Shook  Pain of lower extremity.  Urinary retention (8173769422): Onset on 3/12/2015 at 59 years.  Meralgia paraesthetica (210024439): Onset on 6/21/2014 at 59 years.  Chronic pain (943429724): Onset on 5/27/2014 at 58 years.  Cerebral edema (7222517): Onset on 1/10/2014 at 58 years.  Headache (4551426623): Onset on 1/10/2014 at 58 years.  Tachycardia (8747815492): Onset on 8/6/2012 at 57 years.  Seizure (776510014): Onset on 5/30/2011 at 56 years.  Cognitive impairment (9250223751): Onset on 10/17/2010 at 55 years.  Left homonymous hemianopsia (97004714): Onset on 10/17/2010 at 55 years.  Dysphagia (173347565): Onset on 10/17/2010 at 55 years.  Vitamin D deficiency (27706628): Onset on 10/14/2010 at 55 years.  Vocal cord paralysis (236982965): Onset on 10/12/2010 at 55 years.  Hyperglycemia (892147247): Onset on 9/30/2010 at 55 years.  Major depressive disorder (5502834514): Onset on 10/11/2007 at 52 years.  Left hemiparesis (193758171)  Hypotension (643939159)  Thrombocytopenia  (9902443153)  Anemia (782287632)  Alcoholic hepatitis (530371960)  COPD (chronic obstructive pulmonary disease) (62200976)  Diabetes mellitus (868209253)  Anxiety (6765461577)   Family History:    Diabetes mellitus  Grandmother (M)  Grandfather (M)  Aunt (M)  Breast cancer  Mother (Carmen): onset at 40 .  Aunt (M)  Sister: onset at 30 .  Heart disease  Grandfather (M)     Procedure history:    [facility] BSO - Total abdominal hysterectomy and bilateral salpingo-oophorectomy (SNOMED CT 3465555532) on 2012 at 57 Years.   delivery (SNOMED CT 4155779166) in  at 26 Years.  Hysteroscopy (SNOMED CT 986444344).   Social History:        Tobacco Assessment: Current            Current every day smoker, Cigarettes, Total pack years: 20.                     Comments:                      2016 - Kitty Shook                     1/2 pack per day.      Substance Abuse Assessment: Denies Substance Abuse      Home and Environment Assessment            Marital status: .        Physical Examination   Vital Signs   2017 10:25 AM CDT Temperature Tympanic 97.6 DegF  LOW    Peripheral Pulse Rate 74 bpm    HR Method Electronic    Systolic Blood Pressure 144 mmHg  HI    Diastolic Blood Pressure 82 mmHg    Mean Arterial Pressure 103 mmHg    BP Method Electronic      Measurements from flowsheet : Measurements   2017 10:25 AM CDT    Weight Measured - Standard                149.0 lb     General:  Alert and oriented, No acute distress.    Musculoskeletal:  No tenderness, No swelling.    Integumentary:  No rash.    Neurologic:  Alert, Oriented, Cranial Nerves II-XII are grossly intact.       Impression and Plan   Diagnosis     Chronic pain (SVH48-SW G89.29).     Course:  Worsening.    Plan:  increase gabapentin  fu 1 week if not better sooner if worse.    Patient Instructions:       Counseled: Patient, Caregiver, Regarding diagnosis, Regarding treatment, Regarding medications.

## 2022-02-16 NOTE — PROGRESS NOTES
NICK TRUONG  : 1955  MRN: [id number]  [facility] Visit  Primary Care Physician: ELIJAH Gayle MD  S: The patient has been a long-term resident at the [facility] due to complications following left CVA and subsequent neuro changes and weakness to that left side.  On top of this she has a pretty extensive history with diabetes, COPD, impaired cognition, history of cervical cancer, anxiety, alcoholic hepatitis, left-sided hemiparesis, meralgia paresthetica, neuropathic pain, seizure disorder, past history of tobacco use, vitamin D deficiency, and vocal cord palsy.   The patient s main concern over the past many months has been ongoing right-sided body pain.  She states that this causes her great distress and anxiety.  She is currently on OxyContin scheduled as well as gabapentin.  There were some questions about the use of Lyrica as noted in one of the previous notes from Dr. Gayle from April.  The patient tells me that she has been to the pain clinic and they have not been able to offer her any other response or improvement.  She also tells me that she has capped out with her physical therapy; although, she has not had it for the past several months.  I asked her if she wanted me to look into this again and she has declined that offer stating that she would not participate even if offered and covered by her insurance.    O: Blood pressure is 159/69.  Pulse rate is 74.  Regular respiratory rate, 18, unlabored.  Temperature is 98.0.  O2 is 93% on room air.  She is alert and cooperative when I am in the room.  She is lying in bed.  She is eating lunch.  She is using her right arm to assist with eating.  Head is normocephalic.  Trachea is midline.  No evidence of JVD.  Lung sounds are clear bilaterally but she does not have full expansion; part of this is just because she is lying in bed.  Cardio assessment: S1, S2 with no S3 or S4.  No JVD as previously stated.  She has  trace lower extremity edema.  Abdomen is soft and nontender with positive bowel sounds throughout.    A: Chronic pain due to left-sided hemiparesis and right-sided neuropathic pain.    P: Under advisement per Dr. Uriah Gayle, her medications are not going to be changed today.  The patient has declined any intervention with physical therapy/occupational therapy.  She states that she has no other complaints so no other interventions will be done.  I have communicated to her that if she does have any issues that should arise or if she would like to speak with one of us she always has the right and she states that she understands, and that is how I left our visit with the patient today.    D: 06/14/2018  T: 06/14/2018  PRIMO Dickens-CNP/sandra  c:  [facility] and [facility]

## 2022-02-16 NOTE — PROGRESS NOTES
Patient:   NICK TRUONG            MRN: [id number]            FIN: [id number]               Age:   64 years     Sex:  Female     :  1955   Associated Diagnoses:   Chronic pain   Author:   Stanford Gayle MD      Visit Information      Date of Service: 2019 01:20 pm  Performing Location: [facility]  Encounter#: [id number]      Primary Care Provider (PCP):  Stanford Gayle MD    NPI# [id number]      Referring Provider:  Stanford Gayle MD# [id number]      Chief Complaint   2019 1:29 PM CDT    Nursing home rounds        History of Present Illness   Neuropathic pain   right sided continues but also leg leg(non stroke side)  On gabapentin   No other new sxs  Both upper and lower extremity complaints  cymbalta as well         Review of Systems   Constitutional:  Negative except as documented in history of present illness.    Respiratory:  Negative.    Cardiovascular:  Negative.    Gastrointestinal:  Negative.    Genitourinary:  Negative.    Musculoskeletal:  Negative except as documented in history of present illness.    Integumentary:  Negative.    Neurologic:  Negative except as documented in history of present illness.              Health Status   Allergies:    Allergic Reactions (Selected)  Severity Not Documented  TraZODone (Anxiety)   Medications:  (Selected)   Prescriptions  Prescribed  Cymbalta 30 mg oral delayed release capsule: = 1 cap(s) ( 30 mg ), po, bid, # 30 cap(s), 0 Refill(s), Type: Maintenance  clonazePAM 0.125 mg oral tablet, disintegratin tab(s) ( 0.125 mg ), Oral, bid, # 60 tab(s), 0 Refill(s), Type: Maintenance  oxyCODONE 5 mg oral capsule: 1 cap(s) ( 5 mg ), po, q12 hrs, # 30 cap(s), 0 Refill(s), Type: Maintenance  Documented Medications  Documented  Dulcolax Laxative: = 2 tab(s), po, daily, 0 Refill(s), Type: Maintenance  Incruse Ellipta 62.5 mcg/inh inhalation powder: ( 62.5 mcg ), inh, q 24 hrs, 0 Refill(s), Type: Maintenance  MiraLax oral  powder for reconstitution: ( 17 gm ), Oral, every other day, 0 Refill(s), Type: Maintenance  RisperDAL 1 mg oral tablet: = 1 tab(s) ( 1 mg ), Oral, daily, Instructions: 1mg BID and 0.5mg at HS, 0 Refill(s), Type: Maintenance  Ventolin HFA 90 mcg/inh inhalation aerosol: 2 puff(s), inh, q4 hrs, PRN: as needed for wheezing, 0 Refill(s), Type: Maintenance  acetaminophen 500 mg oral tablet: = 2 tab(s) ( 1,000 mg ), Oral, bid, Instructions: and 2 tabs prn q 6 hours for pain 6-10, 1 tab prn  for pain 1-5, PRN: as needed for pain, 0 Refill(s), Type: Maintenance  aspirin 81 mg oral tablet: 1 tab(s) ( 81 mg ), po, hs, 0 Refill(s), Type: Maintenance  gabapentin 300 mg oral capsule: = 3 cap(s) ( 900 mg ), Oral, hs, Instructions: takes 600mg Qam and 600mg at lunch and 900mg at HS, 0 Refill(s), Type: Maintenance  lidocaine 5% topical film: See Instructions, Instructions: 1 patch(es) to painful area of skin for up to 12hrs within a 24hr period, 0 Refill(s), Type: Maintenance  oxyCODONE 5 mg oral tablet: See Instructions, Instructions: Q8 HRS PRN (also has BID scheduled dose), 0 Refill(s), Type: Maintenance,    Medications          *denotes recorded medication          Cymbalta 30 mg oral delayed release capsule: 30 mg, 1 cap(s), po, bid, 30 cap(s), 0 Refill(s).          *acetaminophen 500 mg oral tablet: 1,000 mg, 2 tab(s), Oral, bid, and 2 tabs prn q 6 hours for pain 6-10, 1 tab prn  for pain 1-5, PRN: as needed for pain, 0 Refill(s).          *Ventolin HFA 90 mcg/inh inhalation aerosol: 2 puff(s), inh, q4 hrs, PRN: as needed for wheezing, 0 Refill(s).          *aspirin 81 mg oral tablet: 81 mg, 1 tab(s), po, hs, 0 Refill(s).          *Dulcolax Laxative: 2 tab(s), po, daily, 0 Refill(s).          clonazePAM 0.125 mg oral tablet, disintegratin.125 mg, 1 tab(s), Oral, bid, 60 tab(s), 0 Refill(s).          *gabapentin 300 mg oral capsule: 900 mg, 3 cap(s), Oral, hs, takes 600mg Qam and 600mg at lunch and 900mg at HS, 0  Refill(s).          *lidocaine 5% topical film: See Instructions, 1 patch(es) to painful area of skin for up to 12hrs within a 24hr period, 0 Refill(s).          oxyCODONE 5 mg oral capsule: 5 mg, 1 cap(s), po, q12 hrs, 30 cap(s), 0 Refill(s).          *oxyCODONE 5 mg oral tablet: See Instructions, Q8 HRS PRN (also has BID scheduled dose), 0 Refill(s).          *MiraLax oral powder for reconstitution: 17 gm, Oral, every other day, 0 Refill(s).          *RisperDAL 1 mg oral tablet: 1 mg, 1 tab(s), Oral, daily, 1mg BID and 0.5mg at HS, 0 Refill(s).          *Incruse Ellipta 62.5 mcg/inh inhalation powder: 62.5 mcg, inh, q 24 hrs, 0 Refill(s).     Problem list:    All Problems  Alcoholic hepatitis / SNOMED CT 952453716 / Confirmed  Anemia / SNOMED CT 667094370 / Confirmed  Anxiety / SNOMED CT 7324152239 / Confirmed  Dysphagia / SNOMED CT 889906317 / Confirmed  Cerebral edema / SNOMED CT 1746585 / Confirmed  COPD (chronic obstructive pulmonary disease) / SNOMED CT 92324524 / Confirmed  Chronic pain / SNOMED CT 083643368 / Confirmed  CVA (cerebrovascular accident) / SNOMED CT 237380681 / Confirmed  Diabetes mellitus / SNOMED CT 779958976 / Confirmed  Left foot drop / SNOMED CT 38142961 / Confirmed  Headache / SNOMED CT 8191818882 / Confirmed  Tachycardia / SNOMED CT 2458729338 / Confirmed  Hyperglycemia / SNOMED CT 245438178 / Confirmed  Cognitive impairment / SNOMED CT 5803874149 / Confirmed  Left hemiparesis / SNOMED CT 512964578 / Confirmed  Left homonymous hemianopsia / SNOMED CT 01145786 / Confirmed  Hypotension / SNOMED CT 391324555 / Confirmed  Major depressive disorder / SNOMED CT 7207241588 / Confirmed  Meralgia paraesthetica / SNOMED CT 340644926 / Confirmed  Neuropathic pain / SNOMED CT 411894051 / Confirmed  Neuropathy / SNOMED CT 5831829624 / Confirmed  Urinary retention / SNOMED CT 6501178987 / Confirmed  Thrombocytopenia / SNOMED CT 1760294914 / Confirmed  Tobacco user / SNOMED CT 798082652 /  Probable  Vitamin D deficiency / SNOMED CT 77661042 / Confirmed  Vocal cord paralysis / SNOMED CT 644124194 / Confirmed  Inactive: Seizure / SNOMED CT 172232618  Resolved: Cervical cancer / SNOMED CT 866701103      Histories   Past Medical History:    Active  Alcoholic hepatitis (551117538): Onset in 2016 at 61 years.  CVA (cerebrovascular accident) (659069822): Onset on 4/28/2015 at 59 years.  Left foot drop (26887469): Onset on 4/20/2015 at 59 years.  Neuropathy (7265875788): Onset on 3/20/2015 at 59 years.  Comments:  11/30/2016 CST 3:50 PM CST - Kitty Shook  Pain of lower extremity.  Urinary retention (6961682021): Onset on 3/12/2015 at 59 years.  Meralgia paraesthetica (282785265): Onset on 6/21/2014 at 59 years.  Chronic pain (927834372): Onset on 5/27/2014 at 58 years.  Cerebral edema (4255209): Onset on 1/10/2014 at 58 years.  Headache (4026095878): Onset on 1/10/2014 at 58 years.  Tachycardia (7550006067): Onset on 8/6/2012 at 57 years.  Cognitive impairment (1159864797): Onset on 10/17/2010 at 55 years.  Left homonymous hemianopsia (95004294): Onset on 10/17/2010 at 55 years.  Dysphagia (408078751): Onset on 10/17/2010 at 55 years.  Vitamin D deficiency (95227613): Onset on 10/14/2010 at 55 years.  Vocal cord paralysis (544367998): Onset on 10/12/2010 at 55 years.  Hyperglycemia (077736489): Onset on 9/30/2010 at 55 years.  Major depressive disorder (7833720634): Onset on 10/11/2007 at 52 years.  Left hemiparesis (869422944)  Hypotension (422128604)  Thrombocytopenia (3715963792)  Anemia (611266974)  COPD (chronic obstructive pulmonary disease) (69017975)  Diabetes mellitus (451964635)  Comments:  4/13/2018 CDT 10:19 AM CDT - Scarlett Isaac  BP Goal: <130/80  Anxiety (6537328521)  Neuropathic pain (327482990)  Resolved  Cervical cancer (156201236): Onset on 8/3/2015 at 60 years.  Resolved.   Family History:    Diabetes mellitus  Grandmother (M)  Grandfather (M)  Aunt (M)  Breast cancer  Mother  (Carmen): onset at 40 .  Aunt (M)  Sister: onset at 30 .  Heart disease  Grandfather (M)     Procedure history:    [facility] BSO - Total abdominal hysterectomy and bilateral salpingo-oophorectomy (SNOMED CT 6807187366) on 2012 at 57 Years.  Radiation (SNOMED CT 329764786) in  at 56 Years.  Comments:  2019 10:53 AM CST - Kitty Shook  Radiation treatment for cervical cancer.   delivery (SNOMED CT 2019606283) in  at 26 Years.  Hysteroscopy (SNOMED CT 776717498).   Social History:        Tobacco Assessment: Current            Current every day smoker, Cigarettes, Total pack years: 20.                     Comments:                      2016 - Kitty Shook                     1/2 pack per day.      Substance Abuse Assessment: Denies Substance Abuse      Employment and Education Assessment            Disability      Home and Environment Assessment            Marital status: .      Physical Examination   Vital Signs   2019 1:29 PM CDT Temperature Tympanic 97.6 DegF  LOW    Peripheral Pulse Rate 106 bpm  HI    HR Method Electronic    Systolic Blood Pressure 119 mmHg    Diastolic Blood Pressure 80 mmHg    Mean Arterial Pressure 93 mmHg    BP Method Electronic    Vital Signs Comments weight per kinni      Measurements from flowsheet : Measurements   2019 1:29 PM CDT    Weight Measured - Standard                166.0 lb     General:  Alert and oriented, No acute distress.    Neck:  Supple.    Respiratory:  Respirations are non-labored.    Cardiovascular:  Normal rate, Regular rhythm.    Musculoskeletal:  No tenderness, No swelling.    Integumentary:  No rash.    Neurologic:  Alert, Oriented, right sided hemiplegia.       Impression and Plan   Diagnosis     Chronic pain (JXB92-LT G89.29).     Course:  Unchanged.    Plan:  trial prednisone taper to see effect on pain.    Patient Instructions:       Counseled: Patient, Regarding diagnosis, Regarding treatment, Regarding medications.

## 2022-02-16 NOTE — TELEPHONE ENCOUNTER
---------------------  From: Karla Kidd CMA   To: [facility] Message Pool ([id number]_[facility]);     Sent: 1/4/2019 2:25:19 PM CST  Subject: [facility]     PCP:   TITO, caller requested MADISON      Time of Call:  1222       Person Calling:  Provider ITALIA Moreau @ [facility]  Phone number:  [phone]    Note:   Caller is requesting a call back from [facility] about concerns with patients opioid usage and imaging results they have. She will be faxing documentation (not specified).    Last office visit and reason:  12/11/2018 [facility] judi banegas/ AVEL has never seen this pt before.  This is a TFS pt.  I will forward to him for further review.  Luis Akins CMA---------------------  From: Luis Akins CMA ([facility] Message Pool ([id number]_[facility]))   To: [employer] Message Pool ([id number]_[place] [facility] [place]);     Sent: 1/7/2019 7:44:28 AM CST  Subject: FW: [facility]---------------------  From: Scarlett Isaac ([employer] Message Pool (32224_[facility]))   To: Santos Shen PA-C;     Sent: 1/8/2019 3:56:40 PM CST  Subject: FW: [facility]     I assume you saw this patient at the [facility]...would you be able to return call as she requested you specifically?       Paula Moreau NP @ [facility]  Phone number:  [phone]---------------------  From: Santos Shen PA-C   To: [employer] Message Pool ([id number]_[place] [facility] [place]);     Sent: 1/8/2019 4:31:17 PM CST  Subject: RE: [facility]     I spoke with ITALIA Moreau about this patient.  [facility] will no longer prescribe opiates for this patient as she has   been getting opiates from the [facility] as well as from [facility].  But they will continue to   see the patient.  ITALIA Moreau will be faxing notes over to us today or tomorrow.  Additionally, patient had some recent imaging done that showed pelvic floor changes as well as increased size of right kidney  that may need further evaluation.---------------------  From: Scalrett Isaac ([facility] Message  Pool ([id number]_[facility]))   To: Stanford Gayle MD;     Sent: 1/8/2019 4:38:03 PM CST  Subject: FW: [facility]     FYI----------------------  From: Stanford Gayle MD   To: Scarlett Isaac;     Sent: 1/11/2019 7:52:49 AM CST  Subject: RE: [facility]     Please get a copy of the imaging she had done so we can fu further if needed---------------------  From: Scarlett Isaac   To: Laura Gallardo;     Sent: 1/11/2019 8:24:37 AM CST  Subject: FW: [facility]---------------------  From: Laura Gallardo   To: Scarlett Isaac;     Cc: Stanford Gayle MD;      Sent: 1/11/2019 10:01:30 AM CST  Subject: RE: [facility]     Contacted Gifty from [facility] and confirmed that the most recent reports of the abdomen and spine were 10/24/18.  Routed MRI reports to you for review.

## 2022-02-16 NOTE — TELEPHONE ENCOUNTER
---------------------  From: Wanda Campoverde MA (Phone Messages Pool ([id number]_[facility]))   To: Surjit Young MD;     Sent: 12/7/2020 3:35:19 PM CST  Subject: Oxycodone refill     Phone Message    PCP:   TITO      Time of Call:  1449       Person Calling:  Deisy from [facility]  Phone number:  [phone]    Reason for call:  pt is needing her Oxycodone refilled and has been out x2 days.  Per nurse, request was sent 2 days ago for refill.  Returned call at: _    Note:   last refilled 11/20/2020 for #15    Last office visit and reason:  _    Transferred to: _Cannot send electronically. Will try faxing rx.    received confirmation that fax went through---------------------  From: Surjit Young MD   To: [facility] Message Pool ([id number]_[place] [facility] [place]);     Sent: 12/7/2020 3:45:59 PM CST  Subject: RE: Oxycodone refill     ok for refillCannot send electronically. Will try faxing rx.

## 2022-02-16 NOTE — PROGRESS NOTES
Chief Complaint    follow up = the pain is worse and med doesnt agree with her  History of Present Illness      Anna complains of diffuse neuropathic pain.  She does not have a headache.  She is not getting out of her room and being physically active staff report.  She thinks that the gabapentin which was increased September 8 has made things worse rather than better and would like to decrease it.  No nausea vomiting or confusion.  She does admit to feeling a little foggy.  She has a chronic pain in the right thigh.  This pain is no worse.  Review of Systems      She has fatigue but no change in weight as noted.  She is physically inactive.  No cough dyspnea abdominal pain diarrhea swollen joints.  She is weak on the left side leg worse than arm.  Physical Exam   Vitals & Measurements    HR: 81(Peripheral)  BP: 107/77       Patient appears comfortable.  Alert and oriented.  Psychiatric psychomotor retardation with general slowness.  HEENT exam is unremarkable.  Neck supple no thyromegaly.  Chest clear.  Cardiac exam regular.  Extremities have no edema.  Neurologic exam she is alert is oriented cranial nerves are normal she has a brace on the left side and weakness of the leg more than the arm.  Osteoarthritic appearing finger joints.  She has a Lidoderm patch on the right thigh.  Assessment/Plan       Diffuse Pain         Patient is on a rather robust pharmacologic program.  Etiology unclear perhaps related to her stroke.  Will increase her gabapentin from 600 3 times daily to 600 300 600.  Follow-up in a week with Dr. Gayle.  PT.         Ordered:          78722 office outpatient new 45 minutes (Charge), Quantity: 1, Diffuse Pain  Major depressive disorder  Meralgia paraesthetica  Neuropathy                Major depressive disorder         On therapy.         Ordered:          09670 office outpatient new 45 minutes (Charge), Quantity: 1, Diffuse Pain  Major depressive disorder  Meralgia paraesthetica   Neuropathy                Meralgia paraesthetica         On appropriate therapy.         Ordered:          [id number] office outpatient new 45 minutes (Charge), Quantity: 1, Diffuse Pain  Major depressive disorder  Meralgia paraesthetica  Neuropathy                Neuropathy         Ordered:          35581 office outpatient new 45 minutes (Charge), Quantity: 1, Diffuse Pain  Major depressive disorder  Meralgia paraesthetica  Neuropathy           Problem List/Past Medical History    Ongoing     Alcoholic hepatitis     Anemia     Anxiety     Cerebral edema     Cervical cancer     Chronic pain     Cognitive impairment     COPD (chronic obstructive pulmonary disease)     CVA (cerebrovascular accident)     Diabetes mellitus     Dysphagia     Headache     Hyperglycemia     Hypotension     Left foot drop     Left hemiparesis     Left homonymous hemianopsia     Major depressive disorder     Meralgia paraesthetica     Neuropathy     Seizure     Tachycardia     Thrombocytopenia     Tobacco user     Urinary retention     Vitamin D deficiency     Vocal cord paralysis    Historical  Procedure/Surgical History     [facility] - Total abdominal hysterectomy and bilateral salpingo-oophorectomy (2012)      delivery ()     Hysteroscopy  Medications    aspirin 81 mg oral tablet, 81 mg= 1 tab(s), po, hs    bisacodyl, 5 mg, po, daily    cholecalciferol 1000 intl units oral capsule, See Instructions    clonazePAM 0.5 mg oral tablet, 0.25 mg= 0.5 tab(s), po, qam    gabapentin 300 mg oral capsule, See Instructions    Incruse Ellipta 62.5 mcg/inh inhalation powder, 62.5 mcg, inh, q 24 hrs    lidocaine 5% topical film, See Instructions    omega-3 polyunsaturated fatty acids 1000 mg oral capsule, 1000 mg= 1 cap(s), po, daily    oxyCODONE 5 mg oral capsule, 5 mg= 1 cap(s), po, q6 hrs    OxyCONTIN 10 mg oral tablet, extended release, 10 mg= 1 tab(s), po, q12 hrs    RisperDAL 1 mg oral tablet, 1 mg= 1 tab(s), po, bid    Tylenol  500 mg oral tablet, 500 mg= 1 tab(s), po, q4 hrs, PRN    venlafaxine 75 mg oral tablet, 150 mg= 2 tab(s), po, daily    Ventolin HFA 90 mcg/inh inhalation aerosol, 2 puff(s), inh, q4 hrs, PRN  Allergies    traZODone (anxiety)  Social History    Smoking Status - 01/11/2017     Former smoker     Home and Environment - 11/30/2016      Marital status: .     Substance Abuse - Denies Substance Abuse, 11/30/2016     Tobacco - Current, 11/30/2016      Current every day smoker, Cigarettes, Total pack years: 20.  Family History    Breast cancer: Mother (Dx about 40), Sister (Dx about 30) and Aunt (M).    Diabetes mellitus: Aunt (M), Grandfather (M) and Grandmother (M).    Heart disease: Grandfather (M).  Immunizations      Vaccine Date Status      influenza virus vaccine, inactivated 11/15/2013 Recorded      pneumococcal (PPSV23) 07/19/2012 Recorded      Hep B 04/10/1992 Recorded      Hep B 11/21/1991 Recorded      Hep B 10/15/1991 Recorded  Lab Results      Results (Last 90 days)                Laboratory                     Hematology                          CBC                               Hct:      40.2 %  (09/08/17 10:57 AM CDT)                                                                                                                                          Hgb:      13.7 gm/dL  (09/08/17 10:57 AM CDT)                                                                                                                                          MCH:      28.8 pg  (09/08/17 10:57 AM CDT)                                                                                                                                          MCHC:      34.1 gm/dL  (09/08/17 10:57 AM CDT)                                                                                                                                          MCV:      84.6 fL  (09/08/17 10:57 AM CDT)                                                                                                                                           MPV:      8.7 fL  (09/08/17 10:57 AM CDT)                                                                                                                                          Platelet:      270   (09/08/17 10:57 AM CDT)                                                                                                                                          RBC:      4.75   (09/08/17 10:57 AM CDT)                                                                                                                                          RDW:      13.8 %  (09/08/17 10:57 AM CDT)                                                                                                                                          WBC                                        (09/08/17 10:57 AM CDT)                                                                                                                                                4.6   (09/08/17 10:57 AM CDT)                                                                                                                                     Differential                               Abs Basophils:      18   (09/08/17 10:57 AM CDT)                                                                                                                                          Abs Eosinophils:      69   (09/08/17 10:57 AM CDT)                                                                                                                                          Abs Lymphocytes:      1780   (09/08/17 10:57 AM CDT)                                                                                                                                          Abs Monocytes:      313   (09/08/17 10:57 AM CDT)                                                                                                                                           Abs Neutrophils:      2420   (09/08/17 10:57 AM CDT)                                                                                                                                          Basophils:      0.4 %  (09/08/17 10:57 AM CDT)                                                                                                                                          Eosinophils:      1.5 %  (09/08/17 10:57 AM CDT)                                                                                                                                          Lymphocytes:      38.7 %  (09/08/17 10:57 AM CDT)                                                                                                                                          Monocytes:      6.8 %  (09/08/17 10:57 AM CDT)                                                                                                                                          Neutrophils:      52.6 %  (09/08/17 10:57 AM CDT)                                                                                                                                     Other Hematology                               Sed Rate                                        (09/08/17 10:57 AM CDT)                                                                                                                                                14   (09/08/17 10:57 AM CDT)

## 2022-02-16 NOTE — PROGRESS NOTES
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    Respiratory:  Negative.    Cardiovascular:  Negative.    Gastrointestinal:  Negative.    Genitourinary:  Negative.    Musculoskeletal:  Negative except as documented in history of present illness.    Integumentary:  Negative.    Neurologic:  Negative except as documented in history of present illness.              Health Status   Allergies:    Allergic Reactions (Selected)  Severity Not Documented  TraZODone (Anxiety)   Medications:  (Selected)   Prescriptions  Prescribed  Cymbalta 30 mg oral delayed release capsule: = 2 cap(s) ( 60 mg ), po, bid, # 360 cap(s), 3 Refill(s), Type: Maintenance  Luminas Pain Relief Patch: Luminas Pain Relief Patch, See Instructions, Instructions: Apply 1 patch every 24 hours, Supply, # 1 box(es), 11 Refill(s), Type: Maintenance  clonazePAM 0.125 mg oral tablet, disintegratin tab(s) ( 0.125 mg ), Oral, bid, # 60 tab(s), 0 Refill(s), Type: Maintenance  lidocaine 5% topical film: 1 patch(es), Topical, daily, # 30 patch(es), 11 Refill(s), Type:  Maintenance, Pharmacy: [facility], 1 patch(es) Topical daily, 166.5, lb, 09/04/19 13:27:00 CDT, Weight Measured  oxyCODONE 5 mg oral tablet: = 1 tab(s) ( 5 mg ), Oral, hs, PRN: as needed for pain, # 30 tab(s), 0 Refill(s), Type: Maintenance, [facility], 1 tab(s) Oral hs,PRN:as needed for pain, 166.5, lb, 09/04/19 13:27:00 CDT, Weight Measured  Documented Medications  Documented  Dulcolax Laxative: = 2 tab(s), po, daily, 0 Refill(s), Type: Maintenance  Incruse Ellipta 62.5 mcg/inh inhalation powder: ( 62.5 mcg ), inh, q 24 hrs, 0 Refill(s), Type: Maintenance  MiraLax oral powder for reconstitution: ( 17 gm ), Oral, every other day, 0 Refill(s), Type: Maintenance  RisperDAL 1 mg oral tablet: = 1 tab(s) ( 1 mg ), Oral, daily, Instructions: 1mg BID and 0.5mg at HS, 0 Refill(s), Type: Maintenance  Ventolin HFA 90 mcg/inh inhalation aerosol: 2 puff(s), inh, q4 hrs, PRN: as needed for wheezing, 0 Refill(s), Type: Maintenance  acetaminophen 500 mg oral tablet: = 2 tab(s) ( 1,000 mg ), Oral, bid, Instructions: and 2 tabs prn q 6 hours for pain 6-10, 1 tab prn  for pain 1-5, PRN: as needed for pain, 0 Refill(s), Type: Maintenance  aspirin 81 mg oral tablet: 1 tab(s) ( 81 mg ), po, hs, 0 Refill(s), Type: Maintenance  gabapentin 300 mg oral capsule: = 3 cap(s) ( 900 mg ), Oral, hs, Instructions: takes 600mg Qam and 600mg at lunch and 900mg at HS, 0 Refill(s), Type: Maintenance   Problem list:    All Problems (Selected)  Left foot drop / SNOMED CT 14024945 / Confirmed  Major depressive disorder / SNOMED CT 8775102187 / Confirmed  Diabetes mellitus / SNOMED CT 175960325 / Confirmed  Headache / SNOMED CT 6869333376 / Confirmed  Tachycardia / SNOMED CT 0648350339 / Confirmed  Hyperglycemia / SNOMED CT 676027460 / Confirmed  Chronic pain / SNOMED CT 890920311 / Confirmed  Meralgia paraesthetica / SNOMED CT 140030058 / Confirmed  Neuropathy / SNOMED CT 0103095400 / Confirmed  Cognitive impairment /  SNOMED CT 6674238098 / Confirmed  Tobacco user / SNOMED CT 369547102 / Probable  Urinary retention / SNOMED CT 8823457105 / Confirmed  COPD (chronic obstructive pulmonary disease) / SNOMED CT 76722505 / Confirmed  Thrombocytopenia / SNOMED CT 5414467968 / Confirmed  Anxiety / SNOMED CT 7106095773 / Confirmed  CVA (cerebrovascular accident) / SNOMED CT 365498933 / Confirmed  Alcoholic hepatitis / SNOMED CT 200453280 / Confirmed  Neuropathic pain / SNOMED CT 650717524 / Confirmed  Anemia / SNOMED CT 188254777 / Confirmed  Left hemiparesis / SNOMED CT 975865177 / Confirmed  Vocal cord paralysis / SNOMED CT 833261839 / Confirmed  Cerebral edema / SNOMED CT 6709415 / Confirmed  Dysphagia / SNOMED CT 526564571 / Confirmed  Hypotension / SNOMED CT 218759716 / Confirmed  Vitamin D deficiency / SNOMED CT 68779723 / Confirmed  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  CVA (cerebrovascular accident) (207948056): Onset on 4/28/2015 at 59 years.  Left foot drop (06884893): Onset on 4/20/2015 at 59 years.  Neuropathy (2266436993): Onset on 3/20/2015 at 59 years.  Comments:  11/30/2016 CST 3:50 PM CST - Kitty Shook  Pain of lower extremity.  Urinary retention (9909897110): Onset on 3/12/2015 at 59 years.  Meralgia paraesthetica (415778140): Onset on 6/21/2014 at 59 years.  Chronic pain (959520246): Onset on 5/27/2014 at 58 years.  Cerebral edema (6218338): Onset on 1/10/2014 at 58 years.  Headache (3533634615): Onset on 1/10/2014 at 58 years.  Tachycardia (9810704040): Onset on 8/6/2012 at 57 years.  Cognitive impairment (0783969376): Onset on 10/17/2010 at 55 years.  Left homonymous hemianopsia (04125172): Onset on 10/17/2010 at 55 years.  Dysphagia (822830849): Onset on 10/17/2010 at 55 years.  Vitamin D deficiency (72361530): Onset  on 10/14/2010 at 55 years.  Vocal cord paralysis (026587630): Onset on 10/12/2010 at 55 years.  Hyperglycemia (556612836): Onset on 2010 at 55 years.  Major depressive disorder (3485189636): Onset on 10/11/2007 at 52 years.  Left hemiparesis (979752244)  Hypotension (074930491)  Thrombocytopenia (9756503414)  Anemia (777340912)  COPD (chronic obstructive pulmonary disease) (47354702)  Diabetes mellitus (140658008)  Comments:  2018 CDT 10:19 AM CDT - Scarlett Isaac  BP Goal: <130/80  Anxiety (1638182484)  Neuropathic pain (062328842)  Resolved  Cervical cancer (251051483): Onset on 8/3/2015 at 60 years.  Resolved.   Family History:    Diabetes mellitus  Grandmother (M)  Grandfather (M)  Aunt (M)  Breast cancer  Mother (Carmen): onset at 40 .  Aunt (M)  Sister: onset at 30 .  Heart disease  Grandfather (M)     Procedure history:    [facility] BSO - Total abdominal hysterectomy and bilateral salpingo-oophorectomy (SNOMED CT 4046319580) on 2012 at 57 Years.  Radiation (SNOMED CT 561912206) in  at 56 Years.  Comments:  2019 10:53 AM [facility] - Kitty Shook  Radiation treatment for cervical cancer.   delivery (SNOMED CT 8221450948) in  at 26 Years.  Hysteroscopy (SNOMED CT 283044088).   Social History:        Tobacco Assessment: Current            Current every day smoker, Cigarettes, Total pack years: 20.                     Comments:                      2016 - Kitty Shook                     1/2 pack per day.      Substance Abuse Assessment: Denies Substance Abuse      Employment and Education Assessment            Disability      Home and Environment Assessment            Marital status: .        Physical Examination   VS/Measurements   General:  Alert and oriented, No acute distress.    Neurologic:  Alert, Oriented, left sided hemiplegia.       Impression and Plan   Diagnosis     Chronic pain (CSC58-CZ G89.29).     COPD (chronic obstructive pulmonary disease) (RKC18-PS  J44.9).     CVA (cerebrovascular accident) (ECW81-BB I63.9).     Left hemiparesis (LOL94-DK G81.94).     Left homonymous hemianopsia (YOR56-ST H53.462).     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     Course:  Unchanged.    Plan:  No qualifying data available.   .    Patient Instructions:       Counseled: Patient, Regarding diagnosis, Regarding treatment, Regarding medications.

## 2022-02-16 NOTE — PROGRESS NOTES
Patient:   NICK TRUONG            MRN: [id number]            FIN: [id number]               Age:   63 years     Sex:  Female     :  1955   Associated Diagnoses:   Chronic pain   Author:   Stanford Gayle MD      Visit Information      Date of Service: 2018 00:00 am  Performing [facility]  Encounter#: [id number]      Primary Care Provider (PCP):  Stanford Gayle MD    NPI# [id number]      Referring Provider:  Evonne Khan    NPI# [id number]      Chief Complaint      History of Present Illness   Neuropathic pain   right sided continues  On gabapentin   No other new sxs  Both upper and lowe r extremity complaints  cymbalta as well         Review of Systems   Constitutional:  Negative except as documented in history of present illness.    Respiratory:  Negative.    Cardiovascular:  Negative.    Gastrointestinal:  Negative.    Genitourinary:  Negative.    Musculoskeletal:  Negative except as documented in history of present illness.    Integumentary:  Negative.    Neurologic:  Negative except as documented in history of present illness.              Health Status   Allergies:    Allergic Reactions (Selected)  Severity Not Documented  TraZODone (Anxiety)   Medications:  (Selected)   Prescriptions  Prescribed  Cymbalta 30 mg oral delayed release capsule: 1 cap(s) ( 30 mg ), po, daily, # 30 cap(s), 0 Refill(s), Type: Maintenance  clonazePAM 0.125 mg oral tablet, disintegratin tab(s) ( 0.125 mg ), Oral, bid, # 60 tab(s), 0 Refill(s), Type: Maintenance  methadone 5 mg/5 mL oral solution: 3 mL ( 3 mg ), PO, q12 hrs, PRN: for pain, # 180 mL, 0 Refill(s), Type: Maintenance  oxyCODONE 5 mg oral capsule: 1 cap(s) ( 5 mg ), po, q12 hrs, # 30 cap(s), 0 Refill(s), Type: Maintenance  Documented Medications  Documented  Butrans 5 mcg/hr transdermal film, extended release: 1 patch(es), Topical, qweek, Instructions: 1 patch once weekly  apply to clean, dry, intact skin  Rx is given by  pain specialist, 0 Refill(s), Type: Maintenance  Dulcolax Laxative: ( 5 mg ), po, daily, 0 Refill(s), Type: Maintenance  Incruse Ellipta 62.5 mcg/inh inhalation powder: ( 62.5 mcg ), inh, q 24 hrs, 0 Refill(s), Type: Maintenance  RisperDAL 1 mg oral tablet: See Instructions, Instructions: 2mg qam, 1mg in afternoon and 1mg at HS., 0 Refill(s), Type: Maintenance  Tylenol 500 mg oral tablet: 1 tab(s) ( 500 mg ), PO, q4hr, PRN: for pain, 0 Refill(s), Type: Maintenance  Ventolin HFA 90 mcg/inh inhalation aerosol: 2 puff(s), inh, q4 hrs, PRN: as needed for wheezing, 0 Refill(s), Type: Maintenance  aspirin 81 mg oral tablet: 1 tab(s) ( 81 mg ), po, hs, 0 Refill(s), Type: Maintenance  azithromycin 250 mg oral tablet: = 1 packet(s), PO, Once, Instructions: Verbal order given to Mumtaz, # 6 tab(s), 0 Refill(s), Type: Maintenance  bisacodyl: ( 5 mg ), po, daily, 0 Refill(s), Type: Maintenance  cefdinir 300 mg oral capsule: = 1 cap(s) ( 300 mg ), PO, q12hr, Instructions: Verbal order given to Mumtaz, # 20 cap(s), 0 Refill(s), Type: Maintenance  cholecalciferol 1000 intl units oral capsule: See Instructions, Instructions: 1 cap(s) po bid, 0 Refill(s), Type: Maintenance  gabapentin 600 mg oral tablet: 1 tab(s) ( 600 mg ), PO, TID, # 270 tab(s), 0 Refill(s), Type: Maintenance  lidocaine 5% topical film: See Instructions, Instructions: 1 patch(es) to painful area of skin for up to 12hrs within a 24hr period, 0 Refill(s), Type: Maintenance  omega-3 polyunsaturated fatty acids 1000 mg oral capsule: 1 cap(s) ( 1,000 mg ), po, daily, 0 Refill(s), Type: Maintenance,    Medications          *denotes recorded medication          Cymbalta 30 mg oral delayed release capsule: 30 mg, 1 cap(s), po, daily, 30 cap(s), 0 Refill(s).          *Tylenol 500 mg oral tablet: 500 mg, 1 tab(s), PO, q4hr, PRN: for pain, 0 Refill(s).          *Ventolin HFA 90 mcg/inh inhalation aerosol: 2 puff(s), inh, q4 hrs, PRN: as needed for wheezing, 0 Refill(s).           *aspirin 81 mg oral tablet: 81 mg, 1 tab(s), po, hs, 0 Refill(s).          *azithromycin 250 mg oral tablet: 1 packet(s), PO, Once, for 5 day(s), Verbal order given to Mumtaz, 6 tab(s), 0 Refill(s).          *bisacodyl: 5 mg, po, daily, 0 Refill(s).          *Dulcolax Laxative: 5 mg, po, daily, 0 Refill(s).          *Butrans 5 mcg/hr transdermal film, extended release: 1 patch(es), Topical, qweek, 1 patch once weekly  apply to clean, dry, intact skin  Rx is given by pain specialist, 0 Refill(s).          *cefdinir 300 mg oral capsule: 300 mg, 1 cap(s), PO, q12hr, for 10 day(s), Verbal order given to Mumtaz, 20 cap(s), 0 Refill(s).          *cholecalciferol 1000 intl units oral capsule: See Instructions, 1 cap(s) po bid, 0 Refill(s).          clonazePAM 0.125 mg oral tablet, disintegratin.125 mg, 1 tab(s), Oral, bid, 60 tab(s), 0 Refill(s).          *gabapentin 600 mg oral tablet: 600 mg, 1 tab(s), PO, TID, 270 tab(s), 0 Refill(s).          *lidocaine 5% topical film: See Instructions, 1 patch(es) to painful area of skin for up to 12hrs within a 24hr period, 0 Refill(s).          methadone 5 mg/5 mL oral solution: 3 mg, 3 mL, PO, q12 hrs, PRN: for pain, 180 mL, 0 Refill(s).          *omega-3 polyunsaturated fatty acids 1000 mg oral capsule: 1,000 mg, 1 cap(s), po, daily, 0 Refill(s).          oxyCODONE 5 mg oral capsule: 5 mg, 1 cap(s), po, q12 hrs, 30 cap(s), 0 Refill(s).          *RisperDAL 1 mg oral tablet: See Instructions, 2mg qam, 1mg in afternoon and 1mg at HS., 0 Refill(s).          *Incruse Ellipta 62.5 mcg/inh inhalation powder: 62.5 mcg, inh, q 24 hrs, 0 Refill(s).   Problem list:    All Problems  Alcoholic hepatitis / SNOMED CT 853965987 / Confirmed  Anemia / SNOMED CT 184756333 / Confirmed  Anxiety / SNOMED CT 9388826068 / Confirmed  Dysphagia / SNOMED CT 680559971 / Confirmed  Cerebral edema / SNOMED CT 1555116 / Confirmed  COPD (chronic obstructive pulmonary disease) / SNOMED CT 06258068 /  Confirmed  Chronic pain / SNOMED CT 240731370 / Confirmed  CVA (cerebrovascular accident) / SNOMED CT 177985428 / Confirmed  Diabetes mellitus / SNOMED CT 259280248 / Confirmed  Left foot drop / SNOMED CT 99807007 / Confirmed  Headache / SNOMED CT 5583294283 / Confirmed  Tachycardia / SNOMED CT 9182623435 / Confirmed  Hyperglycemia / SNOMED CT 660807130 / Confirmed  Cognitive impairment / SNOMED CT 5055846892 / Confirmed  Left hemiparesis / SNOMED CT 327286890 / Confirmed  Left homonymous hemianopsia / SNOMED CT 28555489 / Confirmed  Hypotension / SNOMED CT 839611084 / Confirmed  Major depressive disorder / SNOMED CT 6397271454 / Confirmed  Meralgia paraesthetica / SNOMED CT 839452232 / Confirmed  Neuropathic pain / SNOMED CT 228854679 / Confirmed  Neuropathy / SNOMED CT 0494495989 / Confirmed  Urinary retention / SNOMED CT 8365054533 / Confirmed  Thrombocytopenia / SNOMED CT 4745320031 / Confirmed  Tobacco user / SNOMED CT 193096504 / Probable  Vitamin D deficiency / SNOMED CT 93704400 / Confirmed  Vocal cord paralysis / SNOMED CT 426172746 / Confirmed  Inactive: Seizure / SNOMED CT 252471728  Resolved: Cervical cancer / SNOMED CT 494616293      Histories   Past Medical History:    Active  Alcoholic hepatitis (678908687): Onset in 2016 at 61 years.  CVA (cerebrovascular accident) (549404100): Onset on 4/28/2015 at 59 years.  Left foot drop (36109440): Onset on 4/20/2015 at 59 years.  Neuropathy (6487868193): Onset on 3/20/2015 at 59 years.  Comments:  11/30/2016 CST 3:50 PM CST - Kitty Shook  Pain of lower extremity.  Urinary retention (1865084974): Onset on 3/12/2015 at 59 years.  Meralgia paraesthetica (862593906): Onset on 6/21/2014 at 59 years.  Chronic pain (298489185): Onset on 5/27/2014 at 58 years.  Cerebral edema (9787599): Onset on 1/10/2014 at 58 years.  Headache (4284561590): Onset on 1/10/2014 at 58 years.  Tachycardia (0764466081): Onset on 8/6/2012 at 57 years.  Cognitive impairment (6163918731):  Onset on 10/17/2010 at 55 years.  Left homonymous hemianopsia (37407453): Onset on 10/17/2010 at 55 years.  Dysphagia (515045245): Onset on 10/17/2010 at 55 years.  Vitamin D deficiency (03460950): Onset on 10/14/2010 at 55 years.  Vocal cord paralysis (255235654): Onset on 10/12/2010 at 55 years.  Hyperglycemia (711073692): Onset on 2010 at 55 years.  Major depressive disorder (7468904978): Onset on 10/11/2007 at 52 years.  Left hemiparesis (976762600)  Hypotension (445389285)  Thrombocytopenia (0472283271)  Anemia (672112708)  COPD (chronic obstructive pulmonary disease) (93099999)  Diabetes mellitus (907021414)  Comments:  2018 CDT 10:19 AM CDT - Scarlett Isaac  BP Goal: <130/80  Anxiety (5723902259)  Neuropathic pain (114915385)  Resolved  Cervical cancer (421062655): Onset on 8/3/2015 at 60 years.  Resolved.   Family History:    Diabetes mellitus  Grandmother (M)  Grandfather (M)  Aunt (M)  Breast cancer  Mother (Carmen): onset at 40 .  Aunt (M)  Sister: onset at 30 .  Heart disease  Grandfather (M)     Procedure history:    [facility] - Total abdominal hysterectomy and bilateral salpingo-oophorectomy (SNOMED CT 4000693472) on 2012 at 57 Years.  Radiation (SNOMED CT 824327490) in  at 56 Years.  Comments:  2019 10:53 AM [facility] - Kitty Shook  Radiation treatment for cervical cancer.   delivery (SNOMED CT 9565948596) in  at 26 Years.  Hysteroscopy (SNOMED CT 001631702).   Social History:        Tobacco Assessment: Current            Current every day smoker, Cigarettes, Total pack years: 20.                     Comments:                      2016 - Kitty Shook                     1/2 pack per day.      Substance Abuse Assessment: Denies Substance Abuse      Employment and Education Assessment            Disability      Home and Environment Assessment            Marital status: .      Physical Examination   VS/Measurements   General:  Alert and oriented, No acute  distress.    Neck:  Supple.    Respiratory:  Lungs are clear to auscultation, Respirations are non-labored.    Cardiovascular:  Normal rate, Regular rhythm.    Gastrointestinal:  Soft.    Musculoskeletal:  No tenderness, No swelling.    Integumentary:  No rash.    Neurologic:  Alert, Oriented, right sided hemiplegia.       Impression and Plan   Diagnosis     Chronic pain ([id number] G89.29).     Course:  Unchanged.    Plan:  no change.    Patient Instructions:       Counseled: Patient, Regarding diagnosis, Regarding treatment, Regarding medications.

## 2022-02-16 NOTE — PROGRESS NOTES
Patient:   NICK TRUONG            MRN: [id number]            FIN: [id number]               Age:   65 years     Sex:  Female     :  1955   Associated Diagnoses:   Chronic pain   Author:   Stanford Gayle MD      Visit Information      Primary Care Provider (PCP):  Stanford Gayle MD    NPI# [id number]      Referring Provider:  No referring provider recorded for selected visit.      Chief Complaint      History of Present Illness   Neuropathic pain   right sided continues   On gabapentin   No other new sxs  Both upper and lower extremity complaints  cymbalta as well         Review of Systems   Constitutional:  Negative except as documented in history of present illness.    Respiratory:  Negative.    Cardiovascular:  Negative.    Gastrointestinal:  Negative.    Genitourinary:  Negative.    Musculoskeletal:  Negative except as documented in history of present illness.    Integumentary:  Negative.    Neurologic:  Negative except as documented in history of present illness.              Health Status   Allergies:    Allergic Reactions (Selected)  Severity Not Documented  TraZODone (Anxiety)   Medications:  (Selected)   Prescriptions  Prescribed  Cymbalta 30 mg oral delayed release capsule: = 2 cap(s) ( 60 mg ), po, bid, # 360 cap(s), 3 Refill(s), Type: Maintenance  Luminas Pain Relief Patch: Luminas Pain Relief Patch, See Instructions, Instructions: Apply 1 patch every 24 hours, Supply, # 1 box(es), 11 Refill(s), Type: Maintenance  clonazePAM 0.125 mg oral tablet, disintegratin tab(s) ( 0.125 mg ), Oral, bid, # 60 tab(s), 0 Refill(s), Type: Maintenance  lidocaine 5% topical film: 1 patch(es), Topical, daily, # 30 patch(es), 11 Refill(s), Type: Maintenance  oxyCODONE 5 mg oral tablet: = 1 tab(s) ( 5 mg ), Oral, hs, PRN: as needed for pain, # 30 tab(s), 0 Refill(s), Type: Maintenance, Pharmacy: [facility], 1 tab(s) Oral hs,PRN:as needed for pain, Weight Measured  Documented  Medications  Documented  Dulcolax Laxative: = 2 tab(s), po, daily, 0 Refill(s), Type: Maintenance  Incruse Ellipta 62.5 mcg/inh inhalation powder: ( 62.5 mcg ), inh, q 24 hrs, 0 Refill(s), Type: Maintenance  MiraLax oral powder for reconstitution: ( 17 gm ), Oral, every other day, 0 Refill(s), Type: Maintenance  RisperDAL 1 mg oral tablet: = 1 tab(s) ( 1 mg ), Oral, daily, Instructions: 1mg BID and 0.5mg at HS, 0 Refill(s), Type: Maintenance  Ventolin HFA 90 mcg/inh inhalation aerosol: 2 puff(s), inh, q4 hrs, PRN: as needed for wheezing, 0 Refill(s), Type: Maintenance  acetaminophen 500 mg oral tablet: = 2 tab(s) ( 1,000 mg ), Oral, bid, Instructions: and 2 tabs prn q 6 hours for pain 6-10, 1 tab prn  for pain 1-5, PRN: as needed for pain, 0 Refill(s), Type: Maintenance  aspirin 81 mg oral tablet: 1 tab(s) ( 81 mg ), po, hs, 0 Refill(s), Type: Maintenance  gabapentin 300 mg oral capsule: = 3 cap(s) ( 900 mg ), Oral, hs, Instructions: takes 600mg Qam and 600mg at lunch and 900mg at HS, 0 Refill(s), Type: Maintenance   Problem list:    All Problems (Selected)  Alcoholic hepatitis / SNOMED CT 389639138 / Confirmed  Anemia / SNOMED CT 854108633 / Confirmed  Anxiety / SNOMED CT 8687005092 / Confirmed  Dysphagia / SNOMED CT 692932523 / Confirmed  Cerebral edema / SNOMED CT 9857720 / Confirmed  COPD (chronic obstructive pulmonary disease) / SNOMED CT 85959852 / Confirmed  Chronic pain / SNOMED CT 288065895 / Confirmed  CVA (cerebrovascular accident) / SNOMED CT 878617931 / Confirmed  Diabetes mellitus / SNOMED CT 781038908 / Confirmed  Left foot drop / SNOMED CT 09178147 / Confirmed  Headache / SNOMED CT 6626612547 / Confirmed  Tachycardia / SNOMED CT 4656073106 / Confirmed  Hyperglycemia / SNOMED CT 524393510 / Confirmed  Cognitive impairment / SNOMED CT 8737380610 / Confirmed  Left hemiparesis / SNOMED CT 755731232 / Confirmed  Left homonymous hemianopsia / SNOMED CT 77602539 / Confirmed  Hypotension / SNOMED CT  439435273 / Confirmed  Major depressive disorder / SNOMED CT 8161835863 / Confirmed  Meralgia paraesthetica / SNOMED CT 797704260 / Confirmed  Neuropathic pain / SNOMED CT 080426238 / Confirmed  Neuropathy / SNOMED CT 3589943315 / Confirmed  Urinary retention / SNOMED CT 2818540642 / Confirmed  Thrombocytopenia / SNOMED CT 5929283296 / Confirmed  Tobacco user / SNOMED CT 275150980 / Probable  Vitamin D deficiency / SNOMED CT 13077360 / Confirmed  Vocal cord paralysis / SNOMED CT 879577722 / Confirmed      Histories   Past Medical History:    Active  Alcoholic hepatitis (834827483): Onset in 2016 at 61 years.  CVA (cerebrovascular accident) (600232444): Onset on 4/28/2015 at 59 years.  Left foot drop (57935529): Onset on 4/20/2015 at 59 years.  Neuropathy (1848777345): Onset on 3/20/2015 at 59 years.  Comments:  11/30/2016 CST 3:50 PM CST - Kitty Shook  Pain of lower extremity.  Urinary retention (7496601616): Onset on 3/12/2015 at 59 years.  Meralgia paraesthetica (290400033): Onset on 6/21/2014 at 59 years.  Chronic pain (473981647): Onset on 5/27/2014 at 58 years.  Cerebral edema (7106881): Onset on 1/10/2014 at 58 years.  Headache (5549840612): Onset on 1/10/2014 at 58 years.  Tachycardia (3829950710): Onset on 8/6/2012 at 57 years.  Cognitive impairment (4412671007): Onset on 10/17/2010 at 55 years.  Left homonymous hemianopsia (18869282): Onset on 10/17/2010 at 55 years.  Dysphagia (332975414): Onset on 10/17/2010 at 55 years.  Vitamin D deficiency (56096022): Onset on 10/14/2010 at 55 years.  Vocal cord paralysis (374007787): Onset on 10/12/2010 at 55 years.  Hyperglycemia (088231666): Onset on 9/30/2010 at 55 years.  Major depressive disorder (6335940023): Onset on 10/11/2007 at 52 years.  Left hemiparesis (704792162)  Hypotension (579852597)  Thrombocytopenia (2601064861)  Anemia (456514040)  COPD (chronic obstructive pulmonary disease) (96898435)  Diabetes mellitus (854557982)  Comments:  4/13/2018 CDT  10:19 AM CDT - Dereck Scarlett HUFFMAN  BP Goal: <130/80  Anxiety (5088023555)  Neuropathic pain (001848105)  Resolved  Cervical cancer (735930595): Onset on 8/3/2015 at 60 years.  Resolved.   Family History:    Diabetes mellitus  Grandmother (M)  Grandfather (M)  Aunt (M)  Breast cancer  Mother (Carmen): onset at 40 .  Aunt (M)  Sister: onset at 30 .  Heart disease  Grandfather (M)     Procedure history:    [facility] - Total abdominal hysterectomy and bilateral salpingo-oophorectomy (SNOMED CT 1475096470) on 2012 at 57 Years.  Radiation (SNOMED CT 497207156) in  at 56 Years.  Comments:  2019 10:53 AM CST - Kitty Shook  Radiation treatment for cervical cancer.   delivery (SNOMED CT 2663736279) in  at 26 Years.  Hysteroscopy (SNOMED CT 724837403).   Social History:        Tobacco Assessment: Current            Current every day smoker, Cigarettes, Total pack years: 20.                     Comments:                      2016 - Kitty Shook                     1/2 pack per day.      Substance Abuse Assessment: Denies Substance Abuse      Employment and Education Assessment            Disability      Home and Environment Assessment            Marital status: .        Physical Examination   VS/Measurements   General:  Alert and oriented, No acute distress.    Neck:  Supple.    Respiratory:  Respirations are non-labored.    Cardiovascular:  Normal rate, Regular rhythm.    Musculoskeletal:  No tenderness, No swelling.    Integumentary:  No rash.    Neurologic:  Alert, Oriented, right sided hemiplegia.       Impression and Plan   Diagnosis     Chronic pain (QIF86-LU G89.29).     Course:  Unchanged.    Plan:  down to 1 oxycodone daily.    Patient Instructions:       Counseled: Patient, Regarding diagnosis, Regarding treatment, Regarding medications.

## 2022-02-16 NOTE — PROGRESS NOTES
Patient:   NICK TRUONG            MRN: [id number]            FIN: [id number]               Age:   62 years     Sex:  Female     :  1955   Associated Diagnoses:   Chronic pain   Author:   Stanford Gayle MD      Visit Information      Date of Service: 2018 11:10 am  Performing Location: [facility]  Encounter#: [id number]      Primary Care Provider (PCP):  Stanford Gayle MD    NPI# [id number]      Referring Provider:  Stanford Gayle MD, NPI# [id number]      Chief Complaint   2018 11:16 AM CDT    Pain per kinnic        History of Present Illness   Neuropathic pain worse  right sided  On gabapentin   No other new sxs  Both upper and lowe r extremity complaints  cymbalta just increased         Review of Systems   Constitutional:  Negative except as documented in history of present illness.    Respiratory:  Negative.    Cardiovascular:  Negative.    Gastrointestinal:  Negative.    Genitourinary:  Negative.    Musculoskeletal:  Negative except as documented in history of present illness.    Integumentary:  Negative.    Neurologic:  Negative except as documented in history of present illness.              Health Status   Allergies:    Allergic Reactions (Selected)  Severity Not Documented  TraZODone (Anxiety)   Medications:  (Selected)   Prescriptions  Prescribed  Cymbalta 30 mg oral delayed release capsule: 1 cap(s) ( 30 mg ), po, daily, # 30 cap(s), 0 Refill(s), Type: Maintenance  oxyCODONE 5 mg oral capsule: 1 cap(s) ( 5 mg ), po, q12 hrs, # 30 cap(s), 0 Refill(s), Type: Maintenance  Documented Medications  Documented  Dulcolax Laxative: ( 5 mg ), po, daily, 0 Refill(s), Type: Maintenance  Incruse Ellipta 62.5 mcg/inh inhalation powder: ( 62.5 mcg ), inh, q 24 hrs, 0 Refill(s), Type: Maintenance  OxyCONTIN 10 mg oral tablet, extended release: 1 tab(s) ( 10 mg ), PO, q12hr, 0 Refill(s), Type: Maintenance  RisperDAL 1 mg oral tablet: See Instructions, Instructions: 2mg qam,  1mg in afternoon and 1mg at HS., 0 Refill(s), Type: Maintenance  Tylenol 500 mg oral tablet: 1 tab(s) ( 500 mg ), PO, q4hr, PRN: for pain, 0 Refill(s), Type: Maintenance  Ventolin HFA 90 mcg/inh inhalation aerosol: 2 puff(s), inh, q4 hrs, PRN: as needed for wheezing, 0 Refill(s), Type: Maintenance  aspirin 81 mg oral tablet: 1 tab(s) ( 81 mg ), po, hs, 0 Refill(s), Type: Maintenance  bisacodyl: ( 5 mg ), po, daily, 0 Refill(s), Type: Maintenance  cholecalciferol 1000 intl units oral capsule: See Instructions, Instructions: 1 cap(s) po bid, 0 Refill(s), Type: Maintenance  clonazePAM 0.5 mg oral tablet: 0.5 tab(s) ( 0.25 mg ), po, qam, Instructions: and 1 tab(s) at hs, 0 Refill(s), Type: Maintenance  gabapentin 600 mg oral tablet: 1 tab(s) ( 600 mg ), PO, TID, # 270 tab(s), 0 Refill(s), Type: Maintenance  lidocaine 5% topical film: See Instructions, Instructions: 1 patch(es) to painful area of skin for up to 12hrs within a 24hr period, 0 Refill(s), Type: Maintenance  omega-3 polyunsaturated fatty acids 1000 mg oral capsule: 1 cap(s) ( 1,000 mg ), po, daily, 0 Refill(s), Type: Maintenance,    Medications          *denotes recorded medication          Cymbalta 30 mg oral delayed release capsule: 30 mg, 1 cap(s), po, daily, 30 cap(s), 0 Refill(s).          *Tylenol 500 mg oral tablet: 500 mg, 1 tab(s), PO, q4hr, PRN: for pain, 0 Refill(s).          *Ventolin HFA 90 mcg/inh inhalation aerosol: 2 puff(s), inh, q4 hrs, PRN: as needed for wheezing, 0 Refill(s).          *aspirin 81 mg oral tablet: 81 mg, 1 tab(s), po, hs, 0 Refill(s).          *bisacodyl: 5 mg, po, daily, 0 Refill(s).          *Dulcolax Laxative: 5 mg, po, daily, 0 Refill(s).          *cholecalciferol 1000 intl units oral capsule: See Instructions, 1 cap(s) po bid, 0 Refill(s).          *clonazePAM 0.5 mg oral tablet: 0.25 mg, 0.5 tab(s), po, qam, and 1 tab(s) at hs, 0 Refill(s).          *gabapentin 600 mg oral tablet: 600 mg, 1 tab(s), PO, TID, 270 tab(s),  0 Refill(s).          *lidocaine 5% topical film: See Instructions, 1 patch(es) to painful area of skin for up to 12hrs within a 24hr period, 0 Refill(s).          *omega-3 polyunsaturated fatty acids 1000 mg oral capsule: 1,000 mg, 1 cap(s), po, daily, 0 Refill(s).          *OxyCONTIN 10 mg oral tablet, extended release: 10 mg, 1 tab(s), PO, q12hr, 0 Refill(s).          oxyCODONE 5 mg oral capsule: 5 mg, 1 cap(s), po, q12 hrs, 30 cap(s), 0 Refill(s).          *RisperDAL 1 mg oral tablet: See Instructions, 2mg qam, 1mg in afternoon and 1mg at HS., 0 Refill(s).          *Incruse Ellipta 62.5 mcg/inh inhalation powder: 62.5 mcg, inh, q 24 hrs, 0 Refill(s).     Problem list:    All Problems  Alcoholic hepatitis / SNOMED CT 819524047 / Confirmed  Anemia / SNOMED CT 539933379 / Confirmed  Anxiety / SNOMED CT 7420009699 / Confirmed  Dysphagia / SNOMED CT 157897007 / Confirmed  Cerebral edema / SNOMED CT 1606814 / Confirmed  COPD (chronic obstructive pulmonary disease) / SNOMED CT 23625933 / Confirmed  Chronic pain / SNOMED CT 586329793 / Confirmed  CVA (cerebrovascular accident) / SNOMED CT 666756783 / Confirmed  Diabetes mellitus / SNOMED CT 663041206 / Confirmed  Left foot drop / SNOMED CT 43300999 / Confirmed  Headache / SNOMED CT 4310258532 / Confirmed  Tachycardia / SNOMED CT 8366039934 / Confirmed  Hyperglycemia / SNOMED CT 895021821 / Confirmed  Cognitive impairment / SNOMED CT 7614278479 / Confirmed  Left hemiparesis / SNOMED CT 925124969 / Confirmed  Left homonymous hemianopsia / SNOMED CT 60710519 / Confirmed  Hypotension / SNOMED CT 258315734 / Confirmed  Major depressive disorder / SNOMED CT 3829373652 / Confirmed  Cervical cancer / SNOMED CT 636133757 / Confirmed  Meralgia paraesthetica / SNOMED CT 159309474 / Confirmed  Neuropathic pain / SNOMED CT 780392844 / Confirmed  Neuropathy / SNOMED CT 0684999952 / Confirmed  Urinary retention / SNOMED CT 9637129857 / Confirmed  Thrombocytopenia / SNOMED CT  3731691881 / Confirmed  Seizure / SNOMED CT 356286870 / Confirmed  Tobacco user / SNOMED CT 588998806 / Probable  Vitamin D deficiency / SNOMED CT 40745130 / Confirmed  Vocal cord paralysis / SNOMED CT 416909620 / Confirmed      Histories   Past Medical History:    Active  Cervical cancer (503858204): Onset on 8/3/2015 at 60 years.  CVA (cerebrovascular accident) (638010915): Onset on 4/28/2015 at 59 years.  Left foot drop (18582532): Onset on 4/20/2015 at 59 years.  Neuropathy (0943226643): Onset on 3/20/2015 at 59 years.  Comments:  11/30/2016 CST 3:50 PM CST - Kitty Shook  Pain of lower extremity.  Urinary retention (2085092214): Onset on 3/12/2015 at 59 years.  Meralgia paraesthetica (245823493): Onset on 6/21/2014 at 59 years.  Chronic pain (434022057): Onset on 5/27/2014 at 58 years.  Cerebral edema (4788055): Onset on 1/10/2014 at 58 years.  Headache (3204870722): Onset on 1/10/2014 at 58 years.  Tachycardia (0028445431): Onset on 8/6/2012 at 57 years.  Seizure (359859346): Onset on 5/30/2011 at 56 years.  Cognitive impairment (9045810838): Onset on 10/17/2010 at 55 years.  Left homonymous hemianopsia (78379751): Onset on 10/17/2010 at 55 years.  Dysphagia (954033800): Onset on 10/17/2010 at 55 years.  Vitamin D deficiency (29843225): Onset on 10/14/2010 at 55 years.  Vocal cord paralysis (825858869): Onset on 10/12/2010 at 55 years.  Hyperglycemia (393262890): Onset on 9/30/2010 at 55 years.  Major depressive disorder (5166285909): Onset on 10/11/2007 at 52 years.  Left hemiparesis (571437052)  Hypotension (402773923)  Thrombocytopenia (7020866867)  Anemia (485830247)  Alcoholic hepatitis (251341283)  COPD (chronic obstructive pulmonary disease) (31926778)  Diabetes mellitus (566576201)  Anxiety (4418168594)  Neuropathic pain (962061747)   Family History:    Diabetes mellitus  Grandmother (M)  Grandfather (M)  Aunt (M)  Breast cancer  Mother (Carmen): onset at 40 .  Aunt (M)  Sister: onset at 30 .  Heart  disease  Grandfather (M)     Procedure history:    [facility] - Total abdominal hysterectomy and bilateral salpingo-oophorectomy (SNOMED CT 2710651101) on 2012 at 57 Years.   delivery (SNOMED CT 7345663890) in  at 26 Years.  Hysteroscopy (SNOMED CT 190433476).   Social History:        Tobacco Assessment: Current            Current every day smoker, Cigarettes, Total pack years: 20.                     Comments:                      2016 - Kitty Shook                     1/2 pack per day.      Substance Abuse Assessment: Denies Substance Abuse      Home and Environment Assessment            Marital status: .        Physical Examination   Vital Signs   2018 11:16 AM CDT Temperature Tympanic 97.8 DegF  LOW    Peripheral Pulse Rate 91 bpm    HR Method Electronic    Systolic Blood Pressure 157 mmHg  HI    Diastolic Blood Pressure 94 mmHg  HI    Mean Arterial Pressure 115 mmHg    BP Method Electronic    Vital Signs Comments weight per kinnic on 18      Measurements from flowsheet : Measurements   2018 11:16 AM CDT    Weight Measured - Standard                157.8 lb     General:  Alert and oriented, No acute distress.    Musculoskeletal:  No tenderness, No swelling.    Integumentary:  No rash.    Neurologic:  Alert, Oriented, right sided hemiplegia.       Impression and Plan   Diagnosis     Chronic pain (EAA95-BZ G89.29).     Course:  Worsening.    Plan:  refer to pain clinic consider lyrica.    Patient Instructions:       Counseled: Patient, Regarding diagnosis, Regarding treatment, Regarding medications.

## 2022-02-16 NOTE — PROGRESS NOTES
History of Present Illness       Asked to renew pain medicines for patient.  She has been in the nursing home because of chronic pain along with COPD a CVA left hemiparesis and mood disorder with a history of a stroke.  She does use a pain patch along with Cymbalta.  She has been on oxycodone 5 mg at night for pain he also takes Risperdal.  Review of Systems       Nurses are unaware of any recent changes  Assessment/Plan       1. Chronic pain (G89.29)          I have reviewed her pain medicine.  There is been no change in her status she seems to be tolerating her present medications  Patient Information     Name:NICK TRUONG      Address:      [address] [place] [id number]     Sex:Female     YOB: 1955     [phone]     Emergency Contact:JASPAL TRUONG     MRN:[id number]     FIN:[id number]     Location:[facility]     Date of Service:07/19/2021      Primary Care Physician:       Stanford Gayel MD, [phone]      Attending Physician:       Surjit Young MD, [phone]  Problem List/Past Medical History    Ongoing     Alcoholic hepatitis     Anemia     Anxiety     Cerebral edema     Chronic pain     Cognitive impairment     COPD (chronic obstructive pulmonary disease)     CVA (cerebrovascular accident)     Diabetes mellitus       Comments: BP Goal: <130/80     Dysphagia     Headache     Hyperglycemia     Hypotension     Left foot drop     Left hemiparesis     Left homonymous hemianopsia     Major depressive disorder     Meralgia paraesthetica     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     Seizure       Comments: With subsequent CVA resulting with left sided hemiparesis.     Tachycardia     Thrombocytopenia     Tobacco user     Urinary retention     Vitamin D deficiency     Vocal cord paralysis    Historical     Cervical cancer  Procedure/Surgical History     [facility] - Total abdominal  hysterectomy and bilateral salpingo-oophorectomy (2012)     Radiation ()      Comments: Radiation treatment for cervical cancer..      delivery ()     Hysteroscopy  Medications    acetaminophen 500 mg oral tablet, 1000 mg= 2 tab(s), Oral, bid, PRN    aspirin 81 mg oral tablet, 81 mg= 1 tab(s), Oral, hs    clonazePAM 0.125 mg oral tablet, disintegrating, 0.125 mg= 1 tab(s), Oral, bid    Cymbalta 30 mg oral delayed release capsule, 60 mg= 2 cap(s), Oral, bid, 3 refills    Dulcolax Laxative, 2 tab(s), Oral, daily    gabapentin 300 mg oral capsule, 900 mg= 3 cap(s), Oral, hs    Incruse Ellipta 62.5 mcg/inh inhalation powder, 62.5 mcg, Inhale, q 24 hrs    lidocaine 5% topical film, 1 patch(es), Topical, daily, 11 refills    Luminas Pain Relief Patch, See Instructions, 11 refills    MiraLax oral powder for reconstitution, 17 gm, Oral, every other day    oxyCODONE 5 mg oral tablet, 5 mg= 1 tab(s), Oral, hs, PRN    RisperDAL 1 mg oral tablet, 1 mg= 1 tab(s), Oral, daily    Ventolin HFA 90 mcg/inh inhalation aerosol, 2 puff(s), Inhale, q4 hrs, PRN  Allergies    traZODone (anxiety)  Social History    Smoking Status     Never smoker     Employment/School      Disability     Home/Environment      Marital status: .     Substance Abuse - Denies Substance Abuse     Tobacco - Current      Current every day smoker, Cigarettes, Total pack years: 20.  Family History    Breast cancer: Mother (Dx about 40), Sister (Dx about 30) and Aunt (M).    Diabetes mellitus: Aunt (M), Grandfather (M) and Grandmother (M).    Heart disease: Grandfather (M).  Immunizations          Scheduled Immunizations          Dose Date(s)          Hep B          04/10/1992, 1991, 10/15/1991          influenza virus vaccine, inactivated          11/15/2013          pneumococcal (PPSV23)          2012          Other Immunizations          Td          2004

## 2022-02-16 NOTE — PROGRESS NOTES
Patient:   NICK TRUONG            MRN: [id number]            FIN: [id number]               Age:   63 years     Sex:  Female     :  1955   Associated Diagnoses:   Chronic pain   Author:   Stanford Gayle MD      Visit Information      Date of Service: 2019 01:15 pm  Performing Location: [facility]  Encounter#: [id number]      Primary Care Provider (PCP):  Stanford Gayle MD    NPI# [id number]      Referring Provider:  Stanford Gayle MD, NPI# [id number]      Chief Complaint   2019 1:31 PM CDT     pain management        History of Present Illness   Still in pain  Has seen pain clinic   Did not like butran or tramadol      Review of Systems   Constitutional:  Negative except as documented in history of present illness.    Musculoskeletal:  Negative except as documented in history of present illness.    Neurologic:  Negative except as documented in history of present illness.              Health Status   Allergies:    Allergic Reactions (Selected)  Severity Not Documented  TraZODone (Anxiety)   Medications:  (Selected)   Prescriptions  Prescribed  Cymbalta 30 mg oral delayed release capsule: = 1 cap(s) ( 30 mg ), po, bid, # 30 cap(s), 0 Refill(s), Type: Maintenance  clonazePAM 0.125 mg oral tablet, disintegratin tab(s) ( 0.125 mg ), Oral, bid, # 60 tab(s), 0 Refill(s), Type: Maintenance  oxyCODONE 5 mg oral capsule: 1 cap(s) ( 5 mg ), po, q12 hrs, # 30 cap(s), 0 Refill(s), Type: Maintenance  Documented Medications  Documented  Dulcolax Laxative: ( 5 mg ), po, daily, 0 Refill(s), Type: Maintenance  Incruse Ellipta 62.5 mcg/inh inhalation powder: ( 62.5 mcg ), inh, q 24 hrs, 0 Refill(s), Type: Maintenance  MiraLax oral powder for reconstitution: ( 17 gm ), Oral, every other day, 0 Refill(s), Type: Maintenance  RisperDAL 1 mg oral tablet: = 1 tab(s) ( 1 mg ), Oral, tid, 0 Refill(s), Type: Maintenance  Ventolin HFA 90 mcg/inh inhalation aerosol: 2 puff(s), inh, q4 hrs, PRN:  as needed for wheezing, 0 Refill(s), Type: Maintenance  acetaminophen 500 mg oral tablet: = 2 tab(s) ( 1,000 mg ), Oral, q6 hrs, PRN: as needed for pain, 0 Refill(s), Type: Maintenance  aspirin 81 mg oral tablet: 1 tab(s) ( 81 mg ), po, hs, 0 Refill(s), Type: Maintenance  cholecalciferol 1000 intl units oral capsule: See Instructions, Instructions: 1 cap(s) po bid, 0 Refill(s), Type: Maintenance  gabapentin 300 mg oral capsule: = 3 cap(s) ( 900 mg ), Oral, hs, Instructions: takes 600mg Qam and 600mg at lunch and 900mg at HS, 0 Refill(s), Type: Maintenance  gabapentin 600 mg oral tablet: 1 tab(s) ( 600 mg ), PO, TID, # 270 tab(s), 0 Refill(s), Type: Maintenance  lidocaine 5% topical film: See Instructions, Instructions: 1 patch(es) to painful area of skin for up to 12hrs within a 24hr period, 0 Refill(s), Type: Maintenance  omega-3 polyunsaturated fatty acids 1000 mg oral capsule: 1 cap(s) ( 1,000 mg ), po, daily, 0 Refill(s), Type: Maintenance,    Medications          *denotes recorded medication          Cymbalta 30 mg oral delayed release capsule: 30 mg, 1 cap(s), po, bid, 30 cap(s), 0 Refill(s).          *acetaminophen 500 mg oral tablet: 1,000 mg, 2 tab(s), Oral, q6 hrs, PRN: as needed for pain, 0 Refill(s).          *Ventolin HFA 90 mcg/inh inhalation aerosol: 2 puff(s), inh, q4 hrs, PRN: as needed for wheezing, 0 Refill(s).          *aspirin 81 mg oral tablet: 81 mg, 1 tab(s), po, hs, 0 Refill(s).          *Dulcolax Laxative: 5 mg, po, daily, 0 Refill(s).          *cholecalciferol 1000 intl units oral capsule: See Instructions, 1 cap(s) po bid, 0 Refill(s).          clonazePAM 0.125 mg oral tablet, disintegratin.125 mg, 1 tab(s), Oral, bid, 60 tab(s), 0 Refill(s).          *gabapentin 600 mg oral tablet: 600 mg, 1 tab(s), PO, TID, 270 tab(s), 0 Refill(s).          *gabapentin 300 mg oral capsule: 900 mg, 3 cap(s), Oral, hs, takes 600mg Qam and 600mg at lunch and 900mg at HS, 0 Refill(s).           *lidocaine 5% topical film: See Instructions, 1 patch(es) to painful area of skin for up to 12hrs within a 24hr period, 0 Refill(s).          *omega-3 polyunsaturated fatty acids 1000 mg oral capsule: 1,000 mg, 1 cap(s), po, daily, 0 Refill(s).          oxyCODONE 5 mg oral capsule: 5 mg, 1 cap(s), po, q12 hrs, 30 cap(s), 0 Refill(s).          *MiraLax oral powder for reconstitution: 17 gm, Oral, every other day, 0 Refill(s).          *RisperDAL 1 mg oral tablet: 1 mg, 1 tab(s), Oral, tid, 0 Refill(s).          *Incruse Ellipta 62.5 mcg/inh inhalation powder: 62.5 mcg, inh, q 24 hrs, 0 Refill(s).   Problem list:    All Problems  Alcoholic hepatitis / SNOMED CT 467053811 / Confirmed  Anemia / SNOMED CT 048039302 / Confirmed  Anxiety / SNOMED CT 8484225524 / Confirmed  Dysphagia / SNOMED CT 040722088 / Confirmed  Cerebral edema / SNOMED CT 4448133 / Confirmed  COPD (chronic obstructive pulmonary disease) / SNOMED CT 97238918 / Confirmed  Chronic pain / SNOMED CT 093993034 / Confirmed  CVA (cerebrovascular accident) / SNOMED CT 712414688 / Confirmed  Diabetes mellitus / SNOMED CT 310612447 / Confirmed  Left foot drop / SNOMED CT 63548435 / Confirmed  Headache / SNOMED CT 8332712878 / Confirmed  Tachycardia / SNOMED CT 6569701980 / Confirmed  Hyperglycemia / SNOMED CT 650316563 / Confirmed  Cognitive impairment / SNOMED CT 5236889174 / Confirmed  Left hemiparesis / SNOMED CT 604806305 / Confirmed  Left homonymous hemianopsia / SNOMED CT 94987904 / Confirmed  Hypotension / SNOMED CT 079483550 / Confirmed  Major depressive disorder / SNOMED CT 9990155777 / Confirmed  Meralgia paraesthetica / SNOMED CT 468988056 / Confirmed  Neuropathic pain / SNOMED CT 350209223 / Confirmed  Neuropathy / SNOMED CT 3832347548 / Confirmed  Urinary retention / SNOMED CT 9403551285 / Confirmed  Thrombocytopenia / SNOMED CT 0113359609 / Confirmed  Tobacco user / SNOMED CT 765108466 / Probable  Vitamin D deficiency / SNOMED CT 77559181 /  Confirmed  Vocal cord paralysis / SNOMED CT 224711055 / Confirmed  Inactive: Seizure / SNOMED CT 034567842  Resolved: Cervical cancer / SNOMED CT 136053446      Histories   Past Medical History:    Active  Alcoholic hepatitis (699867553): Onset in 2016 at 61 years.  CVA (cerebrovascular accident) (260327083): Onset on 4/28/2015 at 59 years.  Left foot drop (80559423): Onset on 4/20/2015 at 59 years.  Neuropathy (6454863668): Onset on 3/20/2015 at 59 years.  Comments:  11/30/2016 CST 3:50 PM CST - Kitty Shook  Pain of lower extremity.  Urinary retention (5316059427): Onset on 3/12/2015 at 59 years.  Meralgia paraesthetica (938665355): Onset on 6/21/2014 at 59 years.  Chronic pain (238362275): Onset on 5/27/2014 at 58 years.  Cerebral edema (7403133): Onset on 1/10/2014 at 58 years.  Headache (1128662877): Onset on 1/10/2014 at 58 years.  Tachycardia (1606094107): Onset on 8/6/2012 at 57 years.  Cognitive impairment (2739985098): Onset on 10/17/2010 at 55 years.  Left homonymous hemianopsia (58916746): Onset on 10/17/2010 at 55 years.  Dysphagia (338475584): Onset on 10/17/2010 at 55 years.  Vitamin D deficiency (14569674): Onset on 10/14/2010 at 55 years.  Vocal cord paralysis (166257191): Onset on 10/12/2010 at 55 years.  Hyperglycemia (262018069): Onset on 9/30/2010 at 55 years.  Major depressive disorder (6334907106): Onset on 10/11/2007 at 52 years.  Left hemiparesis (865725684)  Hypotension (586510326)  Thrombocytopenia (1403525631)  Anemia (152675480)  COPD (chronic obstructive pulmonary disease) (51764977)  Diabetes mellitus (444713997)  Comments:  4/13/2018 CDT 10:19 AM CDT - Scarlett Isaac  BP Goal: <130/80  Anxiety (1665858125)  Neuropathic pain (600553462)  Resolved  Cervical cancer (941514667): Onset on 8/3/2015 at 60 years.  Resolved.   Family History:    Diabetes mellitus  Grandmother (M)  Grandfather (M)  Aunt (M)  Breast cancer  Mother (Carmen): onset at 40 .  Aunt (M)  Sister: onset at 30  .  Heart disease  Grandfather (M)     Procedure history:    [facility] - Total abdominal hysterectomy and bilateral salpingo-oophorectomy (SNOMED CT 9653790970) on 2012 at 57 Years.  Radiation (SNOMED CT 386456731) in  at 56 Years.  Comments:  2019 10:53 AM CST - Kitty Shook  Radiation treatment for cervical cancer.   delivery (SNOMED CT 2064265713) in  at 26 Years.  Hysteroscopy (SNOMED CT 538332288).   Social History:        Tobacco Assessment: Current            Current every day smoker, Cigarettes, Total pack years: 20.                     Comments:                      2016 - Kitty Shook                     1/2 pack per day.      Substance Abuse Assessment: Denies Substance Abuse      Employment and Education Assessment            Disability      Home and Environment Assessment            Marital status: .      Physical Examination   Vital Signs   2019 1:31 PM CDT Temperature Tympanic 97.3 DegF  LOW    Peripheral Pulse Rate 102 bpm  HI    HR Method Electronic    Systolic Blood Pressure 122 mmHg    Diastolic Blood Pressure 84 mmHg  HI    Mean Arterial Pressure 97 mmHg    BP Method Electronic    Vital Signs Comments weight per kinni      Measurements from flowsheet : Measurements   2019 1:31 PM CDT     Weight Measured - Standard                160.0 lb     General:  Alert and oriented, No acute distress.    Neurologic:  Alert, Oriented, right sided hemiplegia.       Impression and Plan   Diagnosis     Chronic pain (CQK38-SG G89.29).     Course:  Worsening.    Plan:  refer back  to pain clinic .    Patient Instructions:       Counseled: Patient, Regarding diagnosis, Regarding treatment, Regarding medications.

## 2022-02-16 NOTE — PROGRESS NOTES
NICK TRUONG :  1955 MRN:  [id number]  [facility] Visit  Primary HCP: Stanford Gayle MD   S: The staff asked me to see this resident as she is due for routine rounds for the month of May.  She has been at this facility fairly long term and continues to have some health problems.  Most recently she had a soft visible and palpable mass in her right upper quadrant that proved to be a lipoma on CT of the chest, abdomen and pelvis.  There is no pain in this area.     She suffers from chronic pain, neuralgia, and neuritis particularly of the lower extremities.  The staff tells me that she consulted the Pain [facility] at one point this year and has been discharged from the Pain Clinic.  She has also been doing some pool therapy and physical therapy at the [facility].  She reports to me that her appetite is good.  Staff has no concerns.    PAST MEDICAL HISTORY: Includes anxiety, major depressive disorder, unspecified convulsions, muscle weakness, neuralgia, neuritis, chronic pain in the right leg, and chronic obstructive pulmonary disease.   O: She is alert and converses with me while lying in bed.  Blood pressure is 154.80 although she has other readings of 107/64 and 115/67 in recent documentation in the chart.  Pulse is 81.  Respiratory rate 16.  Temperature 97.  Oxygen saturation on room air is 94%.  Weight is 164 pounds.  Skin is warm, pink and dry.  Apical is regular.  Lungs are decreased in all fields.  Abdomen is soft and there is a visible mass in the right upper lateral quadrant approximately 5 centimeters in diameter and is soft and nontender with palpation.   A: 1.  Chronic pain.    2.  Muscle weakness.    3.  Neuralgia.    4.  Unspecified convulsions.    P: No changes at this time.    D:  2019  T:  2019 Evonne Hartley RN, C-NP/sq    c:  [facility]

## 2022-02-16 NOTE — PROGRESS NOTES
"   Patient:   NICK TRUONG            MRN: [id number]            FIN: [id number]               Age:   62 years     Sex:  Female     :  1955   Associated Diagnoses:   Alcoholic hepatitis; Cognitive impairment; COPD (chronic obstructive pulmonary disease); CVA (cerebrovascular accident); Diabetes mellitus; Left hemiparesis; Left homonymous hemianopsia; Major depressive disorder   Author:   Stanford Gayle MD      Visit Information      Date of Service: 2017  Performing Location: [facility]  Encounter#: [id number]      Primary Care Provider (PCP):  Stanford Gayle MD    NPI# [id number]      Referring Provider:  No referring provider recorded for selected visit.      History of Present Illness   From  visit   \"The patient was admitted to the [facility] on 2016.  The patient is a 61-year-old female with a history of anxiety and cognitive impairment, past cervical cancer, meralgia paresthetica of the right side, left-sided weakness, past stroke, seizure disorder, and COPD.  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  She has also had an unintentional overdose of opiates.  She has chronic constipation.  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  She was admitted, it looks like, from [facility] where she had spent four days.  She was admitted there on  after being discharged from a TCU.  Apparently family was no longer able to provide the care she needed and  stated that she had exhausted her benefits in TCU and a long-term care facility would need to be sought, which is how she came here to the [facility]\"  No change since arrival      Review of Systems            Health Status   Allergies:    Allergic Reactions (Selected)  Severity Not Documented  TraZODone (Anxiety)   Medications:  (Selected)   Documented Medications  Documented  MiraLax oral powder for reconstitution: ( 17 gm ), po, daily, 0 Refill(s), Type: " Maintenance  OxyCONTIN 10 mg oral tablet, extended release: 1 tab(s) ( 10 mg ), PO, q12hr, 0 Refill(s), Type: Maintenance  RisperDAL 1 mg oral tablet: 1 tab(s) ( 1 mg ), PO, BID, Instructions: 1 tablet po in the morning, 1 tablet at noon and 2 tablets at bedtime, # 60 tab(s), 0 Refill(s), Type: Maintenance  Spiriva 18 mcg inhalation capsule: 1 cap(s) ( 18 mcg ), inh, daily, 0 Refill(s), Type: Maintenance  Tylenol 500 mg oral tablet: 1 tab(s) ( 500 mg ), PO, q4hr, PRN: for pain, 0 Refill(s), Type: Maintenance  Ventolin HFA 90 mcg/inh inhalation aerosol: 2 puff(s), inh, q4 hrs, PRN: as needed for wheezing, 0 Refill(s), Type: Maintenance  aspirin 81 mg oral tablet: 1 tab(s) ( 81 mg ), po, hs, 0 Refill(s), Type: Maintenance  bisacodyl: ( 5 mg ), po, daily, 0 Refill(s), Type: Maintenance  cholecalciferol 1000 intl units oral capsule: See Instructions, Instructions: 1 cap(s) po bid, 0 Refill(s), Type: Maintenance  clonazePAM 0.5 mg oral tablet: 0.5 tab(s) ( 0.25 mg ), po, qam, Instructions: and 1 tab(s) at hs, 0 Refill(s), Type: Maintenance  gabapentin 300 mg oral capsule: 1 cap(s) ( 300 mg ), po, tid, 0 Refill(s), Type: Maintenance  lamoTRIgine 25 mg oral tablet: 1 tab(s) ( 25 mg ), PO, BID, 0 Refill(s), Type: Maintenance  lidocaine 5% topical film: See Instructions, Instructions: 1 patch(es) to painful area of skin for up to 12hrs within a 24hr period, 0 Refill(s), Type: Maintenance  multivitamin with minerals (w/ Iron): 1 tablet, po, daily, 0 Refill(s), Type: Maintenance  omega-3 polyunsaturated fatty acids 1000 mg oral capsule: 1 cap(s) ( 1,000 mg ), po, daily, 0 Refill(s), Type: Maintenance  oxyCODONE 5 mg oral capsule: 1 cap(s) ( 5 mg ), po, q6 hrs, Instructions: 1-2 tablets po every 4 hrs if needed for pain. Maximum of 2 tablets per day, 0 Refill(s), Type: Maintenance  venlafaxine 75 mg oral tablet: 2 tab(s) ( 150 mg ), po, daily, 0 Refill(s), Type: Maintenance   Problem list:    All Problems  CVA (cerebrovascular  accident) / SNOMED CT 815032783 / Confirmed  Left hemiparesis / SNOMED CT 294287729 / Confirmed  Seizure / SNOMED CT 126063930 / Confirmed  Hypotension / SNOMED CT 145703626 / Confirmed  Thrombocytopenia / SNOMED CT 7912205458 / Confirmed  Anemia / SNOMED CT 994510480 / Confirmed  Cervical cancer / SNOMED CT 663331963 / Confirmed  Alcoholic hepatitis / SNOMED CT 226309181 / Confirmed  COPD (chronic obstructive pulmonary disease) / SNOMED CT 86616876 / Confirmed  Diabetes mellitus / SNOMED CT 020974900 / Confirmed  Tobacco user / SNOMED CT 192797417 / Probable  Left foot drop / SNOMED CT 88492418 / Confirmed  Neuropathy / SNOMED CT 5293457986 / Confirmed  Anxiety / SNOMED CT 1862913171 / Confirmed  Major depressive disorder / SNOMED CT 1163259522 / Confirmed  Hyperglycemia / SNOMED CT 431201622 / Confirmed  Vocal cord paralysis / SNOMED CT 863503011 / Confirmed  Vitamin D deficiency / SNOMED CT 66058790 / Confirmed  Cognitive impairment / SNOMED CT 4165977478 / Confirmed  Left homonymous hemianopsia / SNOMED CT 74890511 / Confirmed  Dysphagia / SNOMED CT 528586937 / Confirmed  Tachycardia / SNOMED CT 0301788189 / Confirmed  Cerebral edema / SNOMED CT 9435395 / Confirmed  Headache / SNOMED CT 1789900009 / Confirmed  Chronic pain / SNOMED CT 975308922 / Confirmed  Meralgia paraesthetica / SNOMED CT 840258753 / Confirmed  Urinary retention / SNOMED CT 9653367468 / Confirmed      Histories   Past Medical History:    Active  Cervical cancer (157028759): Onset on 8/3/2015 at 60 years.  CVA (cerebrovascular accident) (158011557): Onset on 4/28/2015 at 59 years.  Left foot drop (23920186): Onset on 4/20/2015 at 59 years.  Neuropathy (3913468288): Onset on 3/20/2015 at 59 years.  Comments:  11/30/2016 CST 3:50 PM CST - Kitty Shook  Pain of lower extremity.  Urinary retention (2637378663): Onset on 3/12/2015 at 59 years.  Meralgia paraesthetica (236964705): Onset on 6/21/2014 at 59 years.  Chronic pain (831136476): Onset on  2014 at 58 years.  Cerebral edema (2407519): Onset on 1/10/2014 at 58 years.  Headache (2102305370): Onset on 1/10/2014 at 58 years.  Tachycardia (8681862174): Onset on 2012 at 57 years.  Seizure (593221323): Onset on 2011 at 56 years.  Cognitive impairment (2974057922): Onset on 10/17/2010 at 55 years.  Left homonymous hemianopsia (80247803): Onset on 10/17/2010 at 55 years.  Dysphagia (942516309): Onset on 10/17/2010 at 55 years.  Vitamin D deficiency (19798563): Onset on 10/14/2010 at 55 years.  Vocal cord paralysis (204949460): Onset on 10/12/2010 at 55 years.  Hyperglycemia (209256119): Onset on 2010 at 55 years.  Major depressive disorder (4069000384): Onset on 10/11/2007 at 52 years.  Left hemiparesis (743013786)  Hypotension (621747998)  Thrombocytopenia (6750029435)  Anemia (441250426)  Alcoholic hepatitis (714547859)  COPD (chronic obstructive pulmonary disease) (08241375)  Diabetes mellitus (806003593)  Anxiety (3504318291)   Family History:    Diabetes mellitus  Grandmother (M)  Grandfather (M)  Aunt (M)  Breast cancer  Mother (Carmen): onset at 40 .  Aunt (M)  Sister: onset at 30 .  Heart disease  Grandfather (M)     Procedure history:    [facility] - Total abdominal hysterectomy and bilateral salpingo-oophorectomy (SNOMED CT 3663843914) on 2012 at 57 Years.   delivery (SNOMED CT 3796446076) in  at 26 Years.  Hysteroscopy (SNOMED CT 163014014).   Social History:        Tobacco Assessment: Current            Current every day smoker, Cigarettes, Total pack years: 20.                     Comments:                      2016 - Kitty Shook                     1/2 pack per day.      Substance Abuse Assessment: Denies Substance Abuse      Home and Environment Assessment            Marital status: .        Physical Examination   Neck:  Supple, Non-tender.    Respiratory:  Lungs are clear to auscultation, Respirations are non-labored.    Cardiovascular:  Normal  rate, Regular rhythm.    Gastrointestinal:  Soft.    Neurologic:  Alert, left sided hemiparesis.       Impression and Plan   Diagnosis     Alcoholic hepatitis (SPG57-BX K70.10).     Cognitive impairment (KTX92-BX R41.89).     COPD (chronic obstructive pulmonary disease) (OSD69-GT J44.9).     CVA (cerebrovascular accident) (XGO03-GD I63.9).     Diabetes mellitus (UFO28-KK E11.9).     Left hemiparesis (BOT80-GP G81.94).     Left homonymous hemianopsia (FYN77-VB H53.462).     Major depressive disorder (OFF63-HN F32.9).     Course:  Progressing as expected.    Plan:  fu 2 month.

## 2022-02-16 NOTE — PROGRESS NOTES
Patient:   NICK TRUONG            MRN: [id number]            FIN: [id number]               Age:   65 years     Sex:  Female     :  1955   Associated Diagnoses:   Chronic pain   Author:   Stanford Gayle MD      Chief Complaint      History of Present Illness   Neuropathic pain   right sided continues   On gabapentin   No other new sxs  Both upper and lower extremity complaints  cymbalta as well         Review of Systems   Constitutional:  Negative except as documented in history of present illness.    Respiratory:  Negative.    Cardiovascular:  Negative.    Gastrointestinal:  Negative.    Genitourinary:  Negative.    Musculoskeletal:  Negative except as documented in history of present illness.    Integumentary:  Negative.    Neurologic:  Negative except as documented in history of present illness.              Health Status   Allergies:    Allergic Reactions (Selected)  Severity Not Documented  TraZODone (Anxiety)   Medications:  (Selected)   Prescriptions  Prescribed  Cymbalta 30 mg oral delayed release capsule: = 2 cap(s) ( 60 mg ), po, bid, # 360 cap(s), 3 Refill(s), Type: Maintenance  Luminas Pain Relief Patch: Luminas Pain Relief Patch, See Instructions, Instructions: Apply 1 patch every 24 hours, Supply, # 1 box(es), 11 Refill(s), Type: Maintenance  clonazePAM 0.125 mg oral tablet, disintegratin tab(s) ( 0.125 mg ), Oral, bid, # 60 tab(s), 0 Refill(s), Type: Maintenance  lidocaine 5% topical film: 1 patch(es), Topical, daily, # 30 patch(es), 11 Refill(s), Type: Maintenance, Pharmacy: [facility] , 1 patch(es) Topical daily, Weight Measured  oxyCODONE 5 mg oral tablet: = 1 tab(s) ( 5 mg ), Oral, hs, PRN: as needed for pain, # 30 tab(s), 0 Refill(s), Type: Maintenance, Pharmacy: [facility], 1 tab(s) Oral hs,PRN:as needed for pain, 166.5, lb, 19 13:27:00 CDT, Weight Measured  Documented Medications  Documented  Dulcolax Laxative: = 2 tab(s), po, daily, 0 Refill(s),  Type: Maintenance  Incruse Ellipta 62.5 mcg/inh inhalation powder: ( 62.5 mcg ), inh, q 24 hrs, 0 Refill(s), Type: Maintenance  MiraLax oral powder for reconstitution: ( 17 gm ), Oral, every other day, 0 Refill(s), Type: Maintenance  RisperDAL 1 mg oral tablet: = 1 tab(s) ( 1 mg ), Oral, daily, Instructions: 1mg BID and 0.5mg at HS, 0 Refill(s), Type: Maintenance  Ventolin HFA 90 mcg/inh inhalation aerosol: 2 puff(s), inh, q4 hrs, PRN: as needed for wheezing, 0 Refill(s), Type: Maintenance  acetaminophen 500 mg oral tablet: = 2 tab(s) ( 1,000 mg ), Oral, bid, Instructions: and 2 tabs prn q 6 hours for pain 6-10, 1 tab prn  for pain 1-5, PRN: as needed for pain, 0 Refill(s), Type: Maintenance  aspirin 81 mg oral tablet: 1 tab(s) ( 81 mg ), po, hs, 0 Refill(s), Type: Maintenance  gabapentin 300 mg oral capsule: = 3 cap(s) ( 900 mg ), Oral, hs, Instructions: takes 600mg Qam and 600mg at lunch and 900mg at HS, 0 Refill(s), Type: Maintenance   Problem list:    All Problems (Selected)  Alcoholic hepatitis / SNOMED CT 481123527 / Confirmed  Anemia / SNOMED CT 972400758 / Confirmed  Anxiety / SNOMED CT 3035365115 / Confirmed  Dysphagia / SNOMED CT 749156939 / Confirmed  Cerebral edema / SNOMED CT 7678563 / Confirmed  COPD (chronic obstructive pulmonary disease) / SNOMED CT 87498257 / Confirmed  Chronic pain / SNOMED CT 506284172 / Confirmed  CVA (cerebrovascular accident) / SNOMED CT 620393074 / Confirmed  Diabetes mellitus / SNOMED CT 795903149 / Confirmed  Left foot drop / SNOMED CT 49967116 / Confirmed  Headache / SNOMED CT 2461112421 / Confirmed  Tachycardia / SNOMED CT 2060067796 / Confirmed  Hyperglycemia / SNOMED CT 210223532 / Confirmed  Cognitive impairment / SNOMED CT 0972754725 / Confirmed  Left hemiparesis / SNOMED CT 469838652 / Confirmed  Left homonymous hemianopsia / SNOMED CT 12707008 / Confirmed  Hypotension / SNOMED CT 525106648 / Confirmed  Major depressive disorder / SNOMED CT 0521976130 /  Confirmed  Meralgia paraesthetica / SNOMED CT 854412825 / Confirmed  Neuropathic pain / SNOMED CT 496542962 / Confirmed  Neuropathy / SNOMED CT 7981190868 / Confirmed  Urinary retention / SNOMED CT 3349665521 / Confirmed  Thrombocytopenia / SNOMED CT 3893755745 / Confirmed  Tobacco user / SNOMED CT 822735541 / Probable  Vitamin D deficiency / SNOMED CT 32632223 / Confirmed  Vocal cord paralysis / SNOMED CT 528715027 / Confirmed      Histories   Past Medical History:    Active  Alcoholic hepatitis (612505846): Onset in 2016 at 61 years.  CVA (cerebrovascular accident) (081527447): Onset on 4/28/2015 at 59 years.  Left foot drop (91148275): Onset on 4/20/2015 at 59 years.  Neuropathy (8327964824): Onset on 3/20/2015 at 59 years.  Comments:  11/30/2016 CST 3:50 PM CST - Kitty Shook  Pain of lower extremity.  Urinary retention (4548919524): Onset on 3/12/2015 at 59 years.  Meralgia paraesthetica (786411441): Onset on 6/21/2014 at 59 years.  Chronic pain (219897355): Onset on 5/27/2014 at 58 years.  Cerebral edema (8600328): Onset on 1/10/2014 at 58 years.  Headache (6413752030): Onset on 1/10/2014 at 58 years.  Tachycardia (5055784301): Onset on 8/6/2012 at 57 years.  Cognitive impairment (0230921481): Onset on 10/17/2010 at 55 years.  Left homonymous hemianopsia (05760122): Onset on 10/17/2010 at 55 years.  Dysphagia (082650002): Onset on 10/17/2010 at 55 years.  Vitamin D deficiency (24780290): Onset on 10/14/2010 at 55 years.  Vocal cord paralysis (699946068): Onset on 10/12/2010 at 55 years.  Hyperglycemia (613311147): Onset on 9/30/2010 at 55 years.  Major depressive disorder (8291599078): Onset on 10/11/2007 at 52 years.  Left hemiparesis (470999031)  Hypotension (584099054)  Thrombocytopenia (9224763943)  Anemia (779379454)  COPD (chronic obstructive pulmonary disease) (32847001)  Diabetes mellitus (134753311)  Comments:  4/13/2018 CDT 10:19 AM CDT - Scarlett Isaac  BP Goal: <130/80  Anxiety  (9996656781)  Neuropathic pain (513508430)  Resolved  Cervical cancer (341350336): Onset on 8/3/2015 at 60 years.  Resolved.   Family History:    Diabetes mellitus  Grandmother (M)  Grandfather (M)  Aunt (M)  Breast cancer  Mother (Carmen): onset at 40 .  Aunt (M)  Sister: onset at 30 .  Heart disease  Grandfather (M)     Procedure history:    [facility] - Total abdominal hysterectomy and bilateral salpingo-oophorectomy (SNOMED CT 8569606670) on 2012 at 57 Years.  Radiation (SNOMED CT 652003889) in  at 56 Years.  Comments:  2019 10:53 AM CST - Kitty Shook  Radiation treatment for cervical cancer.   delivery (SNOMED CT 6849752982) in  at 26 Years.  Hysteroscopy (SNOMED CT 193122857).   Social History:        Tobacco Assessment: Current            Current every day smoker, Cigarettes, Total pack years: 20.                     Comments:                      2016 - Kitty Shook                     1/2 pack per day.      Substance Abuse Assessment: Denies Substance Abuse      Employment and Education Assessment            Disability      Home and Environment Assessment            Marital status: .        Physical Examination   VS/Measurements   General:  Alert and oriented, No acute distress.    Neck:  Supple.    Respiratory:  Respirations are non-labored.    Cardiovascular:  Normal rate, Regular rhythm.    Musculoskeletal:  No tenderness, No swelling.    Integumentary:  No rash.    Neurologic:  Alert, Oriented, right sided hemiplegia.       Impression and Plan   Diagnosis     Chronic pain (BXG54-MW G89.29).     Course:  Unchanged.    Plan:  down to 1 oxycodone daily.    Patient Instructions:       Counseled: Patient, Regarding diagnosis, Regarding treatment, Regarding medications.

## 2022-02-16 NOTE — PROGRESS NOTES
Patient:   NICK TRUONG            MRN: [id number]            FIN: [id number]               Age:   65 years     Sex:  Female     :  1955   Associated Diagnoses:   Chronic pain; COPD (chronic obstructive pulmonary disease); CVA (cerebrovascular accident); Left hemiparesis; Left homonymous hemianopsia   Author:   Stanford Gayle MD      Chief Complaint      History of Present Illness   Neuropathic pain   right sided continues   On gabapentin   No other new sxs  Both upper and lower extremity complaints  cymbalta as well         Review of Systems   Constitutional:  Negative except as documented in history of present illness.    Respiratory:  Negative.    Cardiovascular:  Negative.    Gastrointestinal:  Negative.    Genitourinary:  Negative.    Musculoskeletal:  Negative except as documented in history of present illness.    Integumentary:  Negative.    Neurologic:  Negative except as documented in history of present illness.              Health Status   Allergies:    Allergic Reactions (Selected)  Severity Not Documented  TraZODone (Anxiety)   Medications:  (Selected)   Prescriptions  Prescribed  Cymbalta 30 mg oral delayed release capsule: = 2 cap(s) ( 60 mg ), po, bid, # 360 cap(s), 3 Refill(s), Type: Maintenance  Luminas Pain Relief Patch: Luminas Pain Relief Patch, See Instructions, Instructions: Apply 1 patch every 24 hours, Supply, # 1 box(es), 11 Refill(s), Type: Maintenance  clonazePAM 0.125 mg oral tablet, disintegratin tab(s) ( 0.125 mg ), Oral, bid, # 60 tab(s), 0 Refill(s), Type: Maintenance  lidocaine 5% topical film: 1 patch(es), Topical, daily, # 30 patch(es), 11 Refill(s), Type: Maintenance, Pharmacy: [facility], 1 patch(es) Topical daily, Weight Measured  oxyCODONE 5 mg oral tablet: = 1 tab(s) ( 5 mg ), Oral, hs, PRN: as needed for pain, # 15 tab(s), 0 Refill(s), Type: Maintenance  Documented Medications  Documented  Dulcolax Laxative: = 2 tab(s), po, daily, 0 Refill(s), Type:  Maintenance  Incruse Ellipta 62.5 mcg/inh inhalation powder: ( 62.5 mcg ), inh, q 24 hrs, 0 Refill(s), Type: Maintenance  MiraLax oral powder for reconstitution: ( 17 gm ), Oral, every other day, 0 Refill(s), Type: Maintenance  RisperDAL 1 mg oral tablet: = 1 tab(s) ( 1 mg ), Oral, daily, Instructions: 1mg BID and 0.5mg at HS, 0 Refill(s), Type: Maintenance  Ventolin HFA 90 mcg/inh inhalation aerosol: 2 puff(s), inh, q4 hrs, PRN: as needed for wheezing, 0 Refill(s), Type: Maintenance  acetaminophen 500 mg oral tablet: = 2 tab(s) ( 1,000 mg ), Oral, bid, Instructions: and 2 tabs prn q 6 hours for pain 6-10, 1 tab prn  for pain 1-5, PRN: as needed for pain, 0 Refill(s), Type: Maintenance  aspirin 81 mg oral tablet: 1 tab(s) ( 81 mg ), po, hs, 0 Refill(s), Type: Maintenance  gabapentin 300 mg oral capsule: = 3 cap(s) ( 900 mg ), Oral, hs, Instructions: takes 600mg Qam and 600mg at lunch and 900mg at HS, 0 Refill(s), Type: Maintenance   Problem list:    All Problems (Selected)  Alcoholic hepatitis / SNOMED CT 718839289 / Confirmed  Anemia / SNOMED CT 990631187 / Confirmed  Anxiety / SNOMED CT 1654173007 / Confirmed  Dysphagia / SNOMED CT 266438520 / Confirmed  Cerebral edema / SNOMED CT 6951072 / Confirmed  COPD (chronic obstructive pulmonary disease) / SNOMED CT 83559815 / Confirmed  Chronic pain / SNOMED CT 221100155 / Confirmed  CVA (cerebrovascular accident) / SNOMED CT 675397851 / Confirmed  Diabetes mellitus / SNOMED CT 286795790 / Confirmed  Left foot drop / SNOMED CT 64343806 / Confirmed  Headache / SNOMED CT 7801544891 / Confirmed  Tachycardia / SNOMED CT 4960926321 / Confirmed  Hyperglycemia / SNOMED CT 208167555 / Confirmed  Cognitive impairment / SNOMED CT 3541733096 / Confirmed  Left hemiparesis / SNOMED CT 146154784 / Confirmed  Left homonymous hemianopsia / SNOMED CT 61900104 / Confirmed  Hypotension / SNOMED CT 811813883 / Confirmed  Major depressive disorder / SNOMED CT 4985851742 / Confirmed  Meralgia  paraesthetica / SNOMED CT 503020939 / Confirmed  Neuropathic pain / SNOMED CT 840416726 / Confirmed  Neuropathy / SNOMED CT 0612884141 / Confirmed  Urinary retention / SNOMED CT 6727980063 / Confirmed  Thrombocytopenia / SNOMED CT 6218454498 / Confirmed  Tobacco user / SNOMED CT 196380498 / Probable  Vitamin D deficiency / SNOMED CT 53922289 / Confirmed  Vocal cord paralysis / SNOMED CT 480744626 / Confirmed      Histories   Past Medical History:    Active  Alcoholic hepatitis (842707108): Onset in 2016 at 61 years.  CVA (cerebrovascular accident) (018044714): Onset on 4/28/2015 at 59 years.  Left foot drop (54368819): Onset on 4/20/2015 at 59 years.  Neuropathy (3978668664): Onset on 3/20/2015 at 59 years.  Comments:  11/30/2016 CST 3:50 PM CST - Kitty Shook  Pain of lower extremity.  Urinary retention (4399783835): Onset on 3/12/2015 at 59 years.  Meralgia paraesthetica (996682562): Onset on 6/21/2014 at 59 years.  Chronic pain (859126281): Onset on 5/27/2014 at 58 years.  Cerebral edema (8627489): Onset on 1/10/2014 at 58 years.  Headache (2740804412): Onset on 1/10/2014 at 58 years.  Tachycardia (5569732810): Onset on 8/6/2012 at 57 years.  Cognitive impairment (7125482860): Onset on 10/17/2010 at 55 years.  Left homonymous hemianopsia (23958328): Onset on 10/17/2010 at 55 years.  Dysphagia (710681761): Onset on 10/17/2010 at 55 years.  Vitamin D deficiency (37121051): Onset on 10/14/2010 at 55 years.  Vocal cord paralysis (337241589): Onset on 10/12/2010 at 55 years.  Hyperglycemia (554089897): Onset on 9/30/2010 at 55 years.  Major depressive disorder (7052096382): Onset on 10/11/2007 at 52 years.  Left hemiparesis (630150899)  Hypotension (151549944)  Thrombocytopenia (4331896018)  Anemia (771069455)  COPD (chronic obstructive pulmonary disease) (02264510)  Diabetes mellitus (203000127)  Comments:  4/13/2018 CDT 10:19 AM CDT - Scarlett Isaac  BP Goal: <130/80  Anxiety (7993440398)  Neuropathic pain  (652238321)  Resolved  Cervical cancer (101263842): Onset on 8/3/2015 at 60 years.  Resolved.   Family History:    Diabetes mellitus  Grandmother (M)  Grandfather (M)  Aunt (M)  Breast cancer  Mother (Carmen): onset at 40 .  Aunt (M)  Sister: onset at 30 .  Heart disease  Grandfather (M)     Procedure history:    [facility]SO - Total abdominal hysterectomy and bilateral salpingo-oophorectomy (SNOMED CT 9811922176) on 2012 at 57 Years.  Radiation (SNOMED CT 387422870) in  at 56 Years.  Comments:  2019 10:53 AM [facility] - Kitty Shook  Radiation treatment for cervical cancer.   delivery (SNOMED CT 0144798816) in  at 26 Years.  Hysteroscopy (SNOMED CT 609496653).   Social History:        Tobacco Assessment: Current            Current every day smoker, Cigarettes, Total pack years: 20.                     Comments:                      2016 - Kitty Shook                     1/2 pack per day.      Substance Abuse Assessment: Denies Substance Abuse      Employment and Education Assessment            Disability      Home and Environment Assessment            Marital status: .        Physical Examination   VS/Measurements   General:  Alert and oriented, No acute distress.    Neck:  Supple.    Respiratory:  Respirations are non-labored.    Cardiovascular:  Normal rate, Regular rhythm.    Musculoskeletal:  No tenderness, No swelling.    Integumentary:  No rash.    Neurologic:  Alert, Oriented, right sided hemiplegia.       Impression and Plan   Diagnosis     Chronic pain (GZA37-KH G89.29).     COPD (chronic obstructive pulmonary disease) (EAH00-WQ J44.9).     CVA (cerebrovascular accident) (LAI50-GK I63.9).     Left hemiparesis (APX19-SN G81.94).     Left homonymous hemianopsia (IGV85-RD H53.462).     Course:  Unchanged.    Plan:  down to 1 oxycodone daily.    Patient Instructions:       Counseled: Patient, Regarding diagnosis, Regarding treatment, Regarding medications.

## 2022-02-16 NOTE — PROGRESS NOTES
NICK TRUONG :  1955 MRN:  [id number]  [facility] Visit  Primary HCP: Stanford Gayle MD   S: I am seeing this resident for routine rounds.  She has a history of drug induced lethargy and neuropathy. She also has history of cerebrovascular accident, hypertension, and anxiety.  She is doing fairly well and has no concerns.  She still complains of burning and numbness in her lower extremities, this is not a new problem.  She is managing this with gabapentin and oxycodone.  The staff tells me that she is a transfer assist of one.  Her appetite has been good.    O: She is alert and in bed.  Blood pressure 107/83.  Respiratory rate 18.  Pulse 63.  Weight is 147 pounds which is down 10 pounds in the last three months.  Her speech is a little difficult to discern.  Heart is regular.  Lungs are clear.  She has slight edema in her lower extremities but skin is warm, pink, and dry.    A: 1.  Cerebrovascular accident.    2.  Hypertension.   P: No changes at this time.     D:  2017  T:  2017 Evonne Hartley RN, C-NP/sq    c:  [facility]

## 2022-02-16 NOTE — TELEPHONE ENCOUNTER
---------------------  From: Chrystal Nichols CMA (Phone Messages Pool ([address] [place] [address]))   To: [facility] Message Pool ([address] [place] [address]);     Sent: 10/7/2019 4:10:06 PM CDT  Subject: OTC pain patch       Phone Message    PCP:   TFS      Time of Call:  Carmen; mother in law stopped in      Phone number:  [phone]    Time returned call:  _    Note:  Carmen brings in information for [facility] to review.  It is a OTC pain relief patch called Luminas.  It is plant based and all natural.  Carmen tired it on Anna and she reported that there was some relief of pain.    [facility] has the patches but needs a HCP order to use them.    I put the pamphlet in TFS box for review.      Transferred to: [facility] pool---------------------  From: Scarlett Isaac ([facility] Message Pool ([facility]))   To: Stanford Gayle MD;     Sent: 10/8/2019 9:51:36 AM CDT  Subject: FW: OTC pain patch---------------------  From: Stanford Gayle MD   To: [facility] Message Pool ([facility]);     Sent: 10/8/2019 10:13:17 AM CDT  Subject: RE: OTC pain patch     ok for patchesOrder faxed to jannie

## 2022-02-16 NOTE — PROGRESS NOTES
Patient:   NICK TRUONG            MRN: [id number]            FIN: [id number]               Age:   64 years     Sex:  Female     :  1955   Associated Diagnoses:   Bilateral impacted cerumen   Author:   Stanford Gayle MD      Visit Information      Date of Service: 2020 10:20 am  Performing Location: [facility]  Encounter#: [id number]      Primary Care Provider (PCP):  Stanford Gayle MD    NPI# [id number]      Referring Provider:  Stanford Gayle MD    NPI# [id number]      Chief Complaint   2020 10:26 AM CST   Ears plugged per jannie        History of Present Illness   chief complaint and symptoms as noted above confirmed with patient       Review of Systems   Ear/Nose/Mouth/Throat:  Negative.       Health Status   Allergies:    Allergic Reactions (Selected)  Severity Not Documented  TraZODone (Anxiety)   Medications:  (Selected)   Prescriptions  Prescribed  Cymbalta 30 mg oral delayed release capsule: = 2 cap(s) ( 60 mg ), po, bid, # 360 cap(s), 3 Refill(s), Type: Maintenance  Luminas Pain Relief Patch: Luminas Pain Relief Patch, See Instructions, Instructions: Apply 1 patch every 24 hours, Supply, # 1 box(es), 11 Refill(s), Type: Maintenance  clonazePAM 0.125 mg oral tablet, disintegratin tab(s) ( 0.125 mg ), Oral, bid, # 60 tab(s), 0 Refill(s), Type: Maintenance  lidocaine 5% topical film: 1 patch(es), Topical, daily, # 30 patch(es), 11 Refill(s), Type: Maintenance  oxyCODONE 5 mg oral capsule: 1 cap(s) ( 5 mg ), po, q12 hrs, # 30 cap(s), 0 Refill(s), Type: Maintenance  Documented Medications  Documented  Dulcolax Laxative: = 2 tab(s), po, daily, 0 Refill(s), Type: Maintenance  Incruse Ellipta 62.5 mcg/inh inhalation powder: ( 62.5 mcg ), inh, q 24 hrs, 0 Refill(s), Type: Maintenance  MiraLax oral powder for reconstitution: ( 17 gm ), Oral, every other day, 0 Refill(s), Type: Maintenance  RisperDAL 1 mg oral tablet: = 1 tab(s) ( 1 mg ), Oral, daily, Instructions: 1mg  BID and 0.5mg at HS, 0 Refill(s), Type: Maintenance  Ventolin HFA 90 mcg/inh inhalation aerosol: 2 puff(s), inh, q4 hrs, PRN: as needed for wheezing, 0 Refill(s), Type: Maintenance  acetaminophen 500 mg oral tablet: = 2 tab(s) ( 1,000 mg ), Oral, bid, Instructions: and 2 tabs prn q 6 hours for pain 6-10, 1 tab prn  for pain 1-5, PRN: as needed for pain, 0 Refill(s), Type: Maintenance  aspirin 81 mg oral tablet: 1 tab(s) ( 81 mg ), po, hs, 0 Refill(s), Type: Maintenance  gabapentin 300 mg oral capsule: = 3 cap(s) ( 900 mg ), Oral, hs, Instructions: takes 600mg Qam and 600mg at lunch and 900mg at HS, 0 Refill(s), Type: Maintenance  oxyCODONE 5 mg oral tablet: See Instructions, Instructions: Q8 HRS PRN (also has BID scheduled dose), 0 Refill(s), Type: Maintenance,    Medications          *denotes recorded medication          Cymbalta 30 mg oral delayed release capsule: 60 mg, 2 cap(s), po, bid, 360 cap(s), 3 Refill(s).          Luminas Pain Relief Patch: See Instructions, Apply 1 patch every 24 hours, 1 box(es), 11 Refill(s).          *acetaminophen 500 mg oral tablet: 1,000 mg, 2 tab(s), Oral, bid, and 2 tabs prn q 6 hours for pain 6-10, 1 tab prn  for pain 1-5, PRN: as needed for pain, 0 Refill(s).          *Ventolin HFA 90 mcg/inh inhalation aerosol: 2 puff(s), inh, q4 hrs, PRN: as needed for wheezing, 0 Refill(s).          *aspirin 81 mg oral tablet: 81 mg, 1 tab(s), po, hs, 0 Refill(s).          *Dulcolax Laxative: 2 tab(s), po, daily, 0 Refill(s).          clonazePAM 0.125 mg oral tablet, disintegratin.125 mg, 1 tab(s), Oral, bid, 60 tab(s), 0 Refill(s).          *gabapentin 300 mg oral capsule: 900 mg, 3 cap(s), Oral, hs, takes 600mg Qam and 600mg at lunch and 900mg at HS, 0 Refill(s).          lidocaine 5% topical film: 1 patch(es), Topical, daily, 30 patch(es), 11 Refill(s).          oxyCODONE 5 mg oral capsule: 5 mg, 1 cap(s), po, q12 hrs, 30 cap(s), 0 Refill(s).          *oxyCODONE 5 mg oral tablet: See  Instructions, Q8 HRS PRN (also has BID scheduled dose), 0 Refill(s).          *MiraLax oral powder for reconstitution: 17 gm, Oral, every other day, 0 Refill(s).          *RisperDAL 1 mg oral tablet: 1 mg, 1 tab(s), Oral, daily, 1mg BID and 0.5mg at HS, 0 Refill(s).          *Incruse Ellipta 62.5 mcg/inh inhalation powder: 62.5 mcg, inh, q 24 hrs, 0 Refill(s).       Problem list:    All Problems (Selected)  Alcoholic hepatitis / SNOMED CT 003478176 / Confirmed  Anemia / SNOMED CT 439682526 / Confirmed  Anxiety / SNOMED CT 4423643347 / Confirmed  Dysphagia / SNOMED CT 024039155 / Confirmed  Cerebral edema / SNOMED CT 4432589 / Confirmed  COPD (chronic obstructive pulmonary disease) / SNOMED CT 16177237 / Confirmed  Chronic pain / SNOMED CT 434509034 / Confirmed  CVA (cerebrovascular accident) / SNOMED CT 985390221 / Confirmed  Diabetes mellitus / SNOMED CT 020034530 / Confirmed  Left foot drop / SNOMED CT 92577410 / Confirmed  Headache / SNOMED CT 0029474214 / Confirmed  Tachycardia / SNOMED CT 8253512729 / Confirmed  Hyperglycemia / SNOMED CT 562702736 / Confirmed  Cognitive impairment / SNOMED CT 2954967465 / Confirmed  Left hemiparesis / SNOMED CT 759082767 / Confirmed  Left homonymous hemianopsia / SNOMED CT 64466877 / Confirmed  Hypotension / SNOMED CT 287901341 / Confirmed  Major depressive disorder / SNOMED CT 5601527939 / Confirmed  Meralgia paraesthetica / SNOMED CT 112738500 / Confirmed  Neuropathic pain / SNOMED CT 872061799 / Confirmed  Neuropathy / SNOMED CT 3491036862 / Confirmed  Urinary retention / SNOMED CT 7612633163 / Confirmed  Thrombocytopenia / SNOMED CT 6635592498 / Confirmed  Tobacco user / SNOMED CT 018545692 / Probable  Vitamin D deficiency / SNOMED CT 14319609 / Confirmed  Vocal cord paralysis / SNOMED CT 950040775 / Confirmed      Histories   Past Medical History:    Active  Alcoholic hepatitis (083441138): Onset in 2016 at 61 years.  CVA (cerebrovascular accident) (547124376): Onset on  4/28/2015 at 59 years.  Left foot drop (02331210): Onset on 4/20/2015 at 59 years.  Neuropathy (5488659477): Onset on 3/20/2015 at 59 years.  Comments:  11/30/2016 CST 3:50 PM CST - Kitty Shook  Pain of lower extremity.  Urinary retention (8283733063): Onset on 3/12/2015 at 59 years.  Meralgia paraesthetica (798964466): Onset on 6/21/2014 at 59 years.  Chronic pain (444628915): Onset on 5/27/2014 at 58 years.  Cerebral edema (2590138): Onset on 1/10/2014 at 58 years.  Headache (8713828942): Onset on 1/10/2014 at 58 years.  Tachycardia (2255411903): Onset on 8/6/2012 at 57 years.  Cognitive impairment (3915788942): Onset on 10/17/2010 at 55 years.  Left homonymous hemianopsia (08830795): Onset on 10/17/2010 at 55 years.  Dysphagia (234179460): Onset on 10/17/2010 at 55 years.  Vitamin D deficiency (03723399): Onset on 10/14/2010 at 55 years.  Vocal cord paralysis (980799105): Onset on 10/12/2010 at 55 years.  Hyperglycemia (158320018): Onset on 9/30/2010 at 55 years.  Major depressive disorder (2964381052): Onset on 10/11/2007 at 52 years.  Left hemiparesis (015158701)  Hypotension (403170240)  Thrombocytopenia (8092222677)  Anemia (450751660)  COPD (chronic obstructive pulmonary disease) (08017057)  Diabetes mellitus (245191811)  Comments:  4/13/2018 CDT 10:19 AM CDT - Scarlett Isaac  BP Goal: <130/80  Anxiety (5285331408)  Neuropathic pain (902964626)  Resolved  Cervical cancer (869923546): Onset on 8/3/2015 at 60 years.  Resolved.   Family History:    Diabetes mellitus  Grandmother (M)  Grandfather (M)  Aunt (M)  Breast cancer  Mother (Carmen): onset at 40 .  Aunt (M)  Sister: onset at 30 .  Heart disease  Grandfather (M)     Procedure history:    [facility] - Total abdominal hysterectomy and bilateral salpingo-oophorectomy (SNOMED CT 7149002355) on 8/6/2012 at 57 Years.  Radiation (SNOMED CT 704278009) in 2011 at 56 Years.  Comments:  1/14/2019 10:53 AM Kitty Hankins  Radiation treatment for cervical  cancer.   delivery (SNOMED CT 1409550358) in  at 26 Years.  Hysteroscopy (SNOMED CT 037727049).   Social History:        Tobacco Assessment: Current            Current every day smoker, Cigarettes, Total pack years: 20.                     Comments:                      2016 - Kitty Shook                     1/2 pack per day.      Substance Abuse Assessment: Denies Substance Abuse      Employment and Education Assessment            Disability      Home and Environment Assessment            Marital status: .        Physical Examination   Vital Signs   2020 10:26 AM CST Temperature Tympanic 98.4 DegF    Peripheral Pulse Rate 91 bpm    HR Method Electronic    Systolic Blood Pressure 144 mmHg  HI    Diastolic Blood Pressure 97 mmHg  HI    Mean Arterial Pressure 113 mmHg    BP Method Electronic      General:  Alert and oriented, No acute distress.    HENT:       Ear: Both ears, Canal ( Impacted with cerumen, canal curreted and irrigated until clear ).       Impression and Plan   Diagnosis     Bilateral impacted cerumen (ECW84-KO H61.23).     Patient Instructions:       Counseled: Patient, Regarding diagnosis, Regarding treatment.

## 2022-03-01 ENCOUNTER — NURSING HOME VISIT (OUTPATIENT)
Dept: GERIATRICS | Facility: CLINIC | Age: 67
End: 2022-03-01
Payer: MEDICARE

## 2022-03-01 DIAGNOSIS — H57.89 IRRITATION OF LEFT EYE: ICD-10-CM

## 2022-03-01 PROBLEM — M79.2 NEUROPATHIC PAIN: Status: ACTIVE | Noted: 2022-03-01

## 2022-03-01 PROBLEM — F41.9 ANXIETY: Status: ACTIVE | Noted: 2022-03-01

## 2022-03-01 PROBLEM — Z74.09 IMPAIRED MOBILITY: Status: ACTIVE | Noted: 2018-11-28

## 2022-03-01 PROCEDURE — 99307 SBSQ NF CARE SF MDM 10: CPT | Performed by: FAMILY MEDICINE

## 2022-03-01 NOTE — PROGRESS NOTES
[facility] GERIATRICS  Chief Complaint   Patient presents with     Eye Problem     [place] Medical Record Number:  [id number]  Place of Service where encounter took place:  [facility] ([facility]) [[id number]]    HPI:    Anna Mathew  is 66 year old (1955), who is being seen today for a federally mandated E/M visit. Today's concerns are:  Irritation of left eye    ALLERGIES:Patient has no known allergies.  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     Hemiplegia, unspecified, affecting unspecified side      Hyperglycemia 9/30/10     Mechanical complication of gastrostomy (H)      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     Other pulmonary insufficiency, not elsewhere classified 12/6/10    Hospitalized     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     Respiratory failure (H) 9/30/2010     Seizure disorder (H) 9/30 - 11/12/2010    status epilepicus hospitalized at [facility]      Septic shock(785.52) (H) 5/29-6/2/11    Hospitalized @ [facility], urinary source     Thrombocytopenia (H) 5/30/11    likely due to sepsis     Type II or unspecified type diabetes mellitus without mention of complication, not stated as uncontrolled      Unspecified cause of encephalitis, myelitis, and encephalomyelitis      Vitamin D deficiency      PAST SURGICAL HISTORY:   has a past surgical history that includes Gastric/jejunostomy tube placement (11/1/2010).  FAMILY HISTORY: family history is not on file.  SOCIAL HISTORY:  reports that she quit smoking about 11 years ago. Her smoking use included  cigarettes. She smoked 1.00 pack per day. She has never used smokeless tobacco.    MEDICATIONS:     Review of your medicines          Accurate as of March 1, 2022  7:20 PM. If you have any questions, ask your nurse or doctor.            CONTINUE these medicines which have NOT CHANGED      Dose / Directions   * acetaminophen 32 mg/mL liquid  Commonly known as: TYLENOL  Used for: Seizure disorder (H), Hemiparesis, left (H), Cognitive impairment      Dose: 10.2-20.3 mL  Take 10.2-20.3 mLs by mouth every 4 hours as needed. No more than 4000mg daily  Refills: 0     * acetaminophen 325 MG tablet  Commonly known as: TYLENOL  Used for: Seizure disorder (H), Hemiparesis, left (H), Cognitive impairment      Dose: 2 tablet  Take 2 tablets by mouth 3 times daily.  Refills: 0     acetaminophen-codeine 300-30 MG tablet  Commonly known as: TYLENOL #3  Used for: Seizure disorder (H), Hemiparesis, left (H), Cognitive impairment      Dose: 1-2 tablet  Take 1-2 tablets by mouth every 4 hours as needed.  Refills: 0     Ativan 0.5 MG tablet  Generic drug: LORazepam      Dose: 1 tablet  Take 1 tablet by mouth as needed  Refills: 0     bisacodyl 10 MG suppository  Commonly known as: DULCOLAX      Dose: 1 suppository  Place 1 suppository rectally daily.  Refills: 0     Cranberry 300 MG Tabs  Used for: Seizure disorder (H), Hemiparesis, left (H), Cognitive impairment      Dose: 1 tablet  Take 1 tablet by mouth 2 times daily.  Refills: 0     CULTURELLE PO      Take by mouth daily  Refills: 0     diazepam 10 MG tablet  Commonly known as: Valium  Used for: Pain in limb, Seizure disorder (H)      Take 1 tablet 30 minutes prior to MRI.  Quantity: 1 tablet  Refills: 0     DuoNeb 0.5-2.5 (3) MG/3ML neb solution  Used for: Seizure disorder (H), Hemiparesis, left (H), Cognitive impairment  Generic drug: ipratropium - albuterol 0.5 mg/2.5 mg/3 mL      Dose: 1 ampule  Take 1 ampule by nebulization every 6 hours as needed.  Refills: 0     famotidine 20  MG tablet  Commonly known as: PEPCID      Dose: 20 mg  Take 20 mg by mouth 2 times daily.  Refills: 0     furosemide 20 MG tablet  Commonly known as: LASIX      Dose: 20 mg  Take 20 mg by mouth daily.  Refills: 0     gabapentin 300 MG capsule  Commonly known as: NEURONTIN  Used for: Seizure disorder (H), Hemiparesis, left (H), Cognitive impairment      Dose: 300 mg  Take 1 capsule by mouth 3 times daily.  Quantity: 270 capsule  Refills: 3     levETIRAcetam 1000 MG tablet  Commonly known as: KEPPRA      Dose: 1 tablet  Take 1 tablet by mouth 2 times daily.  Refills: 0     magnesium oxide 400 MG tablet  Commonly known as: MAG-OX      Dose: 1 tablet  Take 1 tablet by mouth 2 times daily.  Refills: 0     metoprolol tartrate 25 MG tablet  Commonly known as: LOPRESSOR      Dose: 25 mg  Take 25 mg by mouth 2 times daily.  Refills: 0     NutriSource Fiber packet      Dose: 1 packet  Take 1 packet by mouth daily.  Refills: 0     * PARoxetine 40 MG tablet  Commonly known as: PAXIL      Dose: 40 mg  Take 40 mg by mouth every morning. 1 tablet daily in addition to 10mg tablets  Refills: 0     * PARoxetine 10 MG tablet  Commonly known as: PAXIL      Dose: 10 mg  Take 10 mg by mouth every morning. 1 tablet daily in addition to 40mg tablet.  Refills: 0     potassium chloride 20 MEQ packet  Commonly known as: KLOR-CON      Dose: 20 mEq  Take 20 mEq by mouth 2 times daily.  Refills: 0     pregabalin 75 MG capsule  Commonly known as: Lyrica  Used for: Pain in limb, Seizure disorder (H)      Dose: 75 mg  Take 1 capsule (75 mg) by mouth 2 times daily  Quantity: 60 capsule  Refills: 2     PROSOURCE PLUS PO      Dose: 30 mL  Take 30 mLs by mouth daily.  Refills: 0     sennosides 8.6 MG tablet  Commonly known as: SENOKOT      Dose: 2 tablet  Take 2 tablets by mouth daily.  Refills: 0     SEROquel 25 MG tablet  Generic drug: QUEtiapine      Dose: 25 mg  Take 25 mg by mouth daily. And 1 tablet every 6 hours prn.  Refills: 0     valproic  acid 250 MG/5ML      Dose: 1,250 mg  Take 1,250 mg by mouth 3 times daily.  Refills: 0     vitamin D2 25404 units (1250 mcg) capsule  Commonly known as: ERGOCALCIFEROL      Dose: 50,000 Units  Take 50,000 Units by mouth once a week.  Refills: 0         * This list has 4 medication(s) that are the same as other medications prescribed for you. Read the directions carefully, and ask your doctor or other care provider to review them with you.               Case Management:  I have reviewed the care plan and MDS and do agree with the plan. Patient's desire to return to the community is not assessible due to cognitive impairment. Information reviewed:  Medications, vital signs, orders, and nursing notes.    ROS:  Unobtainable secondary to cognitive impairment.     Vitals:  There were no vitals taken for this visit.  There is no height or weight on file to calculate BMI.  Exam:  GENERAL APPEARANCE:  Alert, in no distress  EYES:  EOM, conjunctivae, lids, pupils and irises normal, EOM normal, conjunctiva and lids normal, crusting, mattering present no  RESP:  no respiratory distress  CV:  normal rate    Lab/Diagnostic data:   none    ASSESSMENT/PLAN  1. Irritation of left eye    advise lubricating drops and continued observation          Electronically signed by:  Herson Marshall MD

## 2022-03-02 NOTE — PROGRESS NOTES
Patient:   NICK TRUONG            MRN: [id number]            FIN: [id number]               Age:   66 years     Sex:  Female     :  1955   Associated Diagnoses:   Cognitive impairment; Left hemiparesis; Major depressive disorder; Neuropathic pain   Author:   Stanford Gayle MD      Visit Information      Date of Service: 2022 06:52 am  Performing Location: [facility]  Encounter#: [id number]      Primary Care Provider (PCP):  Stanford Gayle MD    NPI# [id number]      Referring Provider:  Stanford Gayle MD, NPI# [id number]      History of Present Illness   ask to consult on Neuropathic pain   right sided continues   On gabapentin and cymbalta  No other new sxs  Both upper and lower extremity complaints         Review of Systems   Constitutional:  Negative except as documented in history of present illness.    Musculoskeletal:  Negative except as documented in history of present illness.    Neurologic:  Negative except as documented in history of present illness.       Health Status   Allergies:    Allergic Reactions (Selected)  Severity Not Documented  TraZODone (Anxiety)   Medications:  (Selected)   Prescriptions  Prescribed  Cymbalta 30 mg oral delayed release capsule: = 2 cap(s) ( 60 mg ), po, bid, # 360 cap(s), 3 Refill(s), Type: Maintenance  Luminas Pain Relief Patch: Luminas Pain Relief Patch, See Instructions, Instructions: Apply 1 patch every 24 hours, Supply, # 1 box(es), 11 Refill(s), Type: Maintenance  clonazePAM 0.125 mg oral tablet, disintegratin tab(s) ( 0.125 mg ), Oral, bid, # 60 tab(s), 0 Refill(s), Type: Maintenance  lidocaine 5% topical film: 1 patch(es), Topical, daily, # 30 patch(es), 11 Refill(s), Type: Maintenance, Pharmacy: [facility] , 1 patch(es) Topical daily, 166.5, lb, 19 13:27:00 CDT, Weight Measured  oxyCODONE 5 mg oral tablet: = 1 tab(s) ( 5 mg ), Oral, hs, PRN: as needed for pain, # 30 tab(s), 0 Refill(s), Type:  Maintenance, Pharmacy: [facility], 1 tab(s) Oral hs,PRN:as needed for pain  Documented Medications  Documented  Dulcolax Laxative: = 2 tab(s), po, daily, 0 Refill(s), Type: Maintenance  Incruse Ellipta 62.5 mcg/inh inhalation powder: ( 62.5 mcg ), inh, q 24 hrs, 0 Refill(s), Type: Maintenance  MiraLax oral powder for reconstitution: ( 17 gm ), Oral, every other day, 0 Refill(s), Type: Maintenance  RisperDAL 1 mg oral tablet: = 1 tab(s) ( 1 mg ), Oral, daily, Instructions: 1mg BID and 0.5mg at HS, 0 Refill(s), Type: Maintenance  Ventolin HFA 90 mcg/inh inhalation aerosol: 2 puff(s), inh, q4 hrs, PRN: as needed for wheezing, 0 Refill(s), Type: Maintenance  acetaminophen 500 mg oral tablet: = 2 tab(s) ( 1,000 mg ), Oral, bid, Instructions: and 2 tabs prn q 6 hours for pain 6-10, 1 tab prn  for pain 1-5, PRN: as needed for pain, 0 Refill(s), Type: Maintenance  aspirin 81 mg oral tablet: 1 tab(s) ( 81 mg ), po, hs, 0 Refill(s), Type: Maintenance  gabapentin 300 mg oral capsule: = 3 cap(s) ( 900 mg ), Oral, hs, Instructions: takes 600mg Qam and 600mg at lunch and 900mg at HS, 0 Refill(s), Type: Maintenance   Problem list:    All Problems (Selected)  CVA (cerebrovascular accident) / SNOMED CT 080611050 / Confirmed  Left hemiparesis / SNOMED CT 528095673 / Confirmed  Hypotension / SNOMED CT 170851554 / Confirmed  Thrombocytopenia / SNOMED CT 9687985615 / Confirmed  Anemia / SNOMED CT 472168752 / Confirmed  Alcoholic hepatitis / SNOMED CT 145558069 / Confirmed  COPD (chronic obstructive pulmonary disease) / SNOMED CT 54220721 / Confirmed  Diabetes mellitus / SNOMED CT 456093791 / Confirmed  Tobacco user / SNOMED CT 031955435 / Probable  Left foot drop / SNOMED CT 68887124 / Confirmed  Neuropathy / SNOMED CT 4921147019 / Confirmed  Anxiety / SNOMED CT 3439062196 / Confirmed  Major depressive disorder / SNOMED CT 6664741309 / Confirmed  Hyperglycemia / SNOMED CT 566460710 / Confirmed  Vocal cord paralysis /  SNOMED CT 404167728 / Confirmed  Vitamin D deficiency / SNOMED CT 55220988 / Confirmed  Cognitive impairment / SNOMED CT 1545583411 / Confirmed  Left homonymous hemianopsia / SNOMED CT 88980081 / Confirmed  Dysphagia / SNOMED CT 441175928 / Confirmed  Tachycardia / SNOMED CT 7782926806 / Confirmed  Cerebral edema / SNOMED CT 2725176 / Confirmed  Headache / SNOMED CT 0210511224 / Confirmed  Chronic pain / SNOMED CT 461520114 / Confirmed  Meralgia paraesthetica / SNOMED CT 311040795 / Confirmed  Urinary retention / SNOMED CT 8714471423 / Confirmed  Neuropathic pain / SNOMED CT 779334550 / Confirmed  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  CVA (cerebrovascular accident) (683733490): Onset on 4/28/2015 at 59 years.  Left foot drop (75359037): Onset on 4/20/2015 at 59 years.  Neuropathy (7578246787): Onset on 3/20/2015 at 59 years.  Comments:  11/30/2016 CST 3:50 PM CST - Kitty Shook  Pain of lower extremity.  Urinary retention (0230165286): Onset on 3/12/2015 at 59 years.  Meralgia paraesthetica (330077481): Onset on 6/21/2014 at 59 years.  Chronic pain (980378193): Onset on 5/27/2014 at 58 years.  Cerebral edema (1096812): Onset on 1/10/2014 at 58 years.  Headache (8279427638): Onset on 1/10/2014 at 58 years.  Tachycardia (5789981013): Onset on 8/6/2012 at 57 years.  Cognitive impairment (4252067548): Onset on 10/17/2010 at 55 years.  Left homonymous hemianopsia (18018499): Onset on 10/17/2010 at 55 years.  Dysphagia (631439382): Onset on 10/17/2010 at 55 years.  Vitamin D deficiency (43473375): Onset on 10/14/2010 at 55 years.  Vocal cord paralysis (445662124): Onset on 10/12/2010 at 55 years.  Hyperglycemia (015029696): Onset on 9/30/2010 at 55 years.  Major depressive disorder (1730625507): Onset on 10/11/2007 at 52 years.  Left hemiparesis (090644111)  Hypotension  (540620745)  Thrombocytopenia (4287781316)  Anemia (855807565)  COPD (chronic obstructive pulmonary disease) (66890465)  Diabetes mellitus (947069295)  Comments:  2018 CDT 10:19 AM CDT - Dereck Scarlett HUFFMAN  BP Goal: <130/80  Anxiety (2090982951)  Neuropathic pain (761783113)  Resolved  Cervical cancer (098075007): Onset on 8/3/2015 at 60 years.  Resolved.   Family History:    Diabetes mellitus  Grandmother (M)  Grandfather (M)  Aunt (M)  Breast cancer  Mother (Carmen): onset at 40 .  Aunt (M)  Sister: onset at 30 .  Heart disease  Grandfather (M)     Procedure history:    [facility] - Total abdominal hysterectomy and bilateral salpingo-oophorectomy (SNOMED CT 4183324924) on 2012 at 57 Years.  Radiation (SNOMED CT 992221834) in  at 56 Years.  Comments:  2019 10:53 AM CST - Kitty Shook  Radiation treatment for cervical cancer.   delivery (SNOMED CT 0433620588) in  at 26 Years.  Hysteroscopy (SNOMED CT 461411368).   Social History:        Tobacco Assessment: Current            Current every day smoker, Cigarettes, Total pack years: 20.                     Comments:                      2016 - Kitty Shook                     1/2 pack per day.      Substance Abuse Assessment: Denies Substance Abuse      Employment and Education Assessment            Disability      Home and Environment Assessment            Marital status: .        Physical Examination   Documented vital signs:  Within normal limits.    Neurologic:  Alert.    Psychiatric:  Cooperative, Appropriate mood & affect.       Impression and Plan   Diagnosis     Cognitive impairment (BLC50-QV R41.89).     Left hemiparesis (KPN14-YS G81.94).     Major depressive disorder (PKG87-AN F32.9).     Neuropathic pain (GCC64-KY M79.2).     Plan:  increase gabapentin.

## 2022-03-10 DIAGNOSIS — M79.2 NEUROPATHIC PAIN: Primary | ICD-10-CM

## 2022-03-10 RX ORDER — OXYCODONE HYDROCHLORIDE 5 MG/1
5 TABLET ORAL
Qty: 30 TABLET | Refills: 0 | Status: SHIPPED | OUTPATIENT
Start: 2022-03-10 | End: 2022-04-08

## 2022-03-25 ENCOUNTER — NURSING HOME VISIT (OUTPATIENT)
Dept: GERIATRICS | Facility: CLINIC | Age: 67
End: 2022-03-25
Payer: MEDICARE

## 2022-03-25 DIAGNOSIS — R41.89 COGNITIVE IMPAIRMENT: Primary | ICD-10-CM

## 2022-03-25 DIAGNOSIS — F33.9 EPISODE OF RECURRENT MAJOR DEPRESSIVE DISORDER, UNSPECIFIED DEPRESSION EPISODE SEVERITY (H): ICD-10-CM

## 2022-03-25 DIAGNOSIS — G40.909 SEIZURE DISORDER (H): ICD-10-CM

## 2022-03-25 DIAGNOSIS — H53.462 LEFT HOMONYMOUS HEMIANOPSIA: ICD-10-CM

## 2022-03-25 DIAGNOSIS — E11.69 TYPE 2 DIABETES MELLITUS WITH OTHER SPECIFIED COMPLICATION, WITHOUT LONG-TERM CURRENT USE OF INSULIN (H): ICD-10-CM

## 2022-03-25 DIAGNOSIS — I63.9 CEREBROVASCULAR ACCIDENT (CVA), UNSPECIFIED MECHANISM (H): ICD-10-CM

## 2022-03-25 PROCEDURE — 99309 SBSQ NF CARE MODERATE MDM 30: CPT | Performed by: FAMILY MEDICINE

## 2022-03-25 NOTE — PROGRESS NOTES
[facility] GERIATRICS  No chief complaint on file.    [place] Medical Record Number:  [id number]  Place of Service where encounter took place:  [facility] ([facility]) [[id number]]    HPI:    Anna Mathew  is 66 year old (1955), who is being seen today for a federally mandated E/M visit. Today's concerns are:  Patient seen for her routine nursing home rounds.  Other than her chronic pain from her stroke she has no new complaints for today.    ALLERGIES:Patient has no known allergies.  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     Hemiplegia, unspecified, affecting unspecified side      Hyperglycemia 9/30/10     Mechanical complication of gastrostomy (H)      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     Other pulmonary insufficiency, not elsewhere classified 12/6/10    Hospitalized     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     Respiratory failure (H) 9/30/2010     Seizure disorder (H) 9/30 - 11/12/2010    status epilepicus hospitalized at [facility]      Septic shock(785.52) (H) 5/29-6/2/11    Hospitalized @ [facility], urinary source     Thrombocytopenia (H) 5/30/11    likely due to sepsis     Type II or unspecified type diabetes mellitus without mention of complication, not stated as uncontrolled      Unspecified cause of encephalitis, myelitis, and encephalomyelitis      Vitamin D deficiency      PAST SURGICAL HISTORY:   has a past surgical history that includes Gastric/jejunostomy tube placement (11/1/2010).  FAMILY HISTORY: family history is not on file.  SOCIAL HISTORY:   reports that she quit smoking about 11 years ago. Her smoking use included cigarettes. She smoked 1.00 pack per day. She has never used smokeless tobacco.    MEDICATIONS:     Review of your medicines          Accurate as of March 25, 2022 12:52 PM. If you have any questions, ask your nurse or doctor.            CONTINUE these medicines which have NOT CHANGED      Dose / Directions   * acetaminophen 32 mg/mL liquid  Commonly known as: TYLENOL  Used for: Seizure disorder (H), Hemiparesis, left (H), Cognitive impairment      Dose: 10.2-20.3 mL  Take 10.2-20.3 mLs by mouth every 4 hours as needed. No more than 4000mg daily  Refills: 0     * acetaminophen 325 MG tablet  Commonly known as: TYLENOL  Used for: Seizure disorder (H), Hemiparesis, left (H), Cognitive impairment      Dose: 2 tablet  Take 2 tablets by mouth 3 times daily.  Refills: 0     acetaminophen-codeine 300-30 MG tablet  Commonly known as: TYLENOL #3  Used for: Seizure disorder (H), Hemiparesis, left (H), Cognitive impairment      Dose: 1-2 tablet  Take 1-2 tablets by mouth every 4 hours as needed.  Refills: 0     Ativan 0.5 MG tablet  Generic drug: LORazepam      Dose: 1 tablet  Take 1 tablet by mouth as needed  Refills: 0     bisacodyl 10 MG suppository  Commonly known as: DULCOLAX      Dose: 1 suppository  Place 1 suppository rectally daily.  Refills: 0     Cranberry 300 MG Tabs  Used for: Seizure disorder (H), Hemiparesis, left (H), Cognitive impairment      Dose: 1 tablet  Take 1 tablet by mouth 2 times daily.  Refills: 0     CULTURELLE PO      Take by mouth daily  Refills: 0     diazepam 10 MG tablet  Commonly known as: Valium  Used for: Pain in limb, Seizure disorder (H)      Take 1 tablet 30 minutes prior to MRI.  Quantity: 1 tablet  Refills: 0     DuoNeb 0.5-2.5 (3) MG/3ML neb solution  Used for: Seizure disorder (H), Hemiparesis, left (H), Cognitive impairment  Generic drug: ipratropium - albuterol 0.5 mg/2.5 mg/3 mL      Dose: 1 ampule  Take 1  ampule by nebulization every 6 hours as needed.  Refills: 0     famotidine 20 MG tablet  Commonly known as: PEPCID      Dose: 20 mg  Take 20 mg by mouth 2 times daily.  Refills: 0     furosemide 20 MG tablet  Commonly known as: LASIX      Dose: 20 mg  Take 20 mg by mouth daily.  Refills: 0     gabapentin 300 MG capsule  Commonly known as: NEURONTIN  Used for: Seizure disorder (H), Hemiparesis, left (H), Cognitive impairment      Dose: 300 mg  Take 1 capsule by mouth 3 times daily.  Quantity: 270 capsule  Refills: 3     levETIRAcetam 1000 MG tablet  Commonly known as: KEPPRA      Dose: 1 tablet  Take 1 tablet by mouth 2 times daily.  Refills: 0     magnesium oxide 400 MG tablet  Commonly known as: MAG-OX      Dose: 1 tablet  Take 1 tablet by mouth 2 times daily.  Refills: 0     metoprolol tartrate 25 MG tablet  Commonly known as: LOPRESSOR      Dose: 25 mg  Take 25 mg by mouth 2 times daily.  Refills: 0     NutriSource Fiber packet      Dose: 1 packet  Take 1 packet by mouth daily.  Refills: 0     oxyCODONE 5 MG tablet  Commonly known as: ROXICODONE  Used for: Neuropathic pain      Dose: 5 mg  Take 1 tablet (5 mg) by mouth nightly as needed for severe pain  Quantity: 30 tablet  Refills: 0     * PARoxetine 40 MG tablet  Commonly known as: PAXIL      Dose: 40 mg  Take 40 mg by mouth every morning. 1 tablet daily in addition to 10mg tablets  Refills: 0     * PARoxetine 10 MG tablet  Commonly known as: PAXIL      Dose: 10 mg  Take 10 mg by mouth every morning. 1 tablet daily in addition to 40mg tablet.  Refills: 0     potassium chloride 20 MEQ packet  Commonly known as: KLOR-CON      Dose: 20 mEq  Take 20 mEq by mouth 2 times daily.  Refills: 0     pregabalin 75 MG capsule  Commonly known as: Lyrica  Used for: Pain in limb, Seizure disorder (H)      Dose: 75 mg  Take 1 capsule (75 mg) by mouth 2 times daily  Quantity: 60 capsule  Refills: 2     PROSOURCE PLUS PO      Dose: 30 mL  Take 30 mLs by mouth daily.  Refills: 0      sennosides 8.6 MG tablet  Commonly known as: SENOKOT      Dose: 2 tablet  Take 2 tablets by mouth daily.  Refills: 0     SEROquel 25 MG tablet  Generic drug: QUEtiapine      Dose: 25 mg  Take 25 mg by mouth daily. And 1 tablet every 6 hours prn.  Refills: 0     valproic acid 250 MG/5ML      Dose: 1,250 mg  Take 1,250 mg by mouth 3 times daily.  Refills: 0     vitamin D2 52308 units (1250 mcg) capsule  Commonly known as: ERGOCALCIFEROL      Dose: 50,000 Units  Take 50,000 Units by mouth once a week.  Refills: 0         * This list has 4 medication(s) that are the same as other medications prescribed for you. Read the directions carefully, and ask your doctor or other care provider to review them with you.               Case Management:  I have reviewed the care plan and MDS and do agree with the plan. Patient's desire to return to the community is not present. Information reviewed:  Medications, vital signs, orders, and nursing notes.    ROS:  Review of systems other than her chronic pain is negative  Vitals:  There were no vitals taken for this visit.  There is no height or weight on file to calculate BMI.  Exam:  GENERAL APPEARANCE:  Alert, in no distress  RESP:  respiratory effort and palpation of chest normal  CV:  Palpation and auscultation of heart done , regular rate and rhythm, no murmur, rub, or gallop  NEURO:   Right-sided hemiparesis    Lab/Diagnostic data:       ASSESSMENT/PLAN  (R41.89) Cognitive impairment  (primary encounter diagnosis)  Comment:     Plan: Overall stable    (G40.909) Seizure disorder (H)  Comment: No recent seizures  Plan:     (H53.462) Left homonymous hemianopsia  Comment: Stable from his stroke  Plan:     (I63.9) Cerebrovascular accident (CVA), unspecified mechanism (H)  Comment: Still battling chronic pain from her stroke  Plan:     (F33.9) Episode of recurrent major depressive disorder, unspecified depression episode severity (H)  Comment: Overall seems to be stable  Plan:      (E11.69) Type 2 diabetes mellitus with other specified complication, without long-term current use of insulin (H)  Comment: We will check labs  Plan:       Stanford Gayle MD on 3/25/2022 at 3:50 PM          Electronically signed by:  Sally Rucker

## 2022-04-04 ENCOUNTER — MEDICAL CORRESPONDENCE (OUTPATIENT)
Dept: HEALTH INFORMATION MANAGEMENT | Facility: CLINIC | Age: 67
End: 2022-04-04
Payer: MEDICARE

## 2022-04-04 DIAGNOSIS — M79.2 NEUROPATHIC PAIN: ICD-10-CM

## 2022-04-07 ENCOUNTER — MEDICAL CORRESPONDENCE (OUTPATIENT)
Dept: HEALTH INFORMATION MANAGEMENT | Facility: CLINIC | Age: 67
End: 2022-04-07
Payer: MEDICARE

## 2022-04-08 DIAGNOSIS — M79.2 NEUROPATHIC PAIN: ICD-10-CM

## 2022-04-08 RX ORDER — OXYCODONE HYDROCHLORIDE 5 MG/1
5 TABLET ORAL
Qty: 30 TABLET | Refills: 0 | Status: SHIPPED | OUTPATIENT
Start: 2022-04-08 | End: 2022-05-06

## 2022-05-06 DIAGNOSIS — M79.2 NEUROPATHIC PAIN: ICD-10-CM

## 2022-05-06 RX ORDER — OXYCODONE HYDROCHLORIDE 5 MG/1
5 TABLET ORAL
Qty: 30 TABLET | Refills: 0 | Status: SHIPPED | OUTPATIENT
Start: 2022-05-06 | End: 2022-05-10

## 2022-05-10 DIAGNOSIS — M79.2 NEUROPATHIC PAIN: ICD-10-CM

## 2022-05-10 RX ORDER — OXYCODONE HYDROCHLORIDE 5 MG/1
5 TABLET ORAL
Qty: 30 TABLET | Refills: 0 | Status: SHIPPED | OUTPATIENT
Start: 2022-05-10 | End: 2022-06-30

## 2022-05-27 ENCOUNTER — NURSING HOME VISIT (OUTPATIENT)
Dept: GERIATRICS | Facility: CLINIC | Age: 67
End: 2022-05-27
Payer: MEDICARE

## 2022-05-27 DIAGNOSIS — I63.9 CEREBROVASCULAR ACCIDENT (CVA), UNSPECIFIED MECHANISM (H): Primary | ICD-10-CM

## 2022-05-27 DIAGNOSIS — J96.90 RESPIRATORY FAILURE, UNSPECIFIED CHRONICITY, UNSPECIFIED WHETHER WITH HYPOXIA OR HYPERCAPNIA (H): ICD-10-CM

## 2022-05-27 DIAGNOSIS — J44.9 CHRONIC OBSTRUCTIVE PULMONARY DISEASE, UNSPECIFIED COPD TYPE (H): ICD-10-CM

## 2022-05-27 DIAGNOSIS — E11.69 TYPE 2 DIABETES MELLITUS WITH OTHER SPECIFIED COMPLICATION, WITHOUT LONG-TERM CURRENT USE OF INSULIN (H): ICD-10-CM

## 2022-05-27 DIAGNOSIS — G81.94 HEMIPARESIS, LEFT (H): ICD-10-CM

## 2022-05-27 PROCEDURE — 99309 SBSQ NF CARE MODERATE MDM 30: CPT | Performed by: FAMILY MEDICINE

## 2022-05-27 NOTE — PROGRESS NOTES
[facility] GERIATRICS  Chief Complaint   Patient presents with     [facility]     [place] [facility] Record Number:  [id number]  Place of Service where encounter took place:  [facility] (SNF) [[id number]]    HPI:    Anna Mathew  is 66 year old (1955), who is being seen today for a federally Claudine is in very good spirits today watching TV no complaints    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     Hemiplegia, unspecified, affecting unspecified side      Hyperglycemia 9/30/10     Mechanical complication of gastrostomy (H)      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     Other pulmonary insufficiency, not elsewhere classified 12/6/10    Hospitalized     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     Respiratory failure (H) 9/30/2010     Seizure disorder (H) 9/30 - 11/12/2010    status epilepicus hospitalized at [place]      Septic shock(785.52) (H) 5/29-6/2/11    Hospitalized @ [facility], urinary source     Thrombocytopenia (H) 5/30/11    likely due to sepsis     Type II or unspecified type diabetes mellitus without mention of complication, not stated as uncontrolled      Unspecified cause of encephalitis, myelitis, and encephalomyelitis      Vitamin D deficiency      PAST SURGICAL HISTORY:   has a past surgical history that includes Gastric/jejunostomy tube placement (11/1/2010).  FAMILY HISTORY: family history is not on file.  SOCIAL HISTORY:  reports that she quit smoking about 11 years ago. Her smoking use included cigarettes. She smoked  1.00 pack per day. She has never used smokeless tobacco.    MEDICATIONS:     Review of your medicines          Accurate as of May 27, 2022  8:31 AM. If you have any questions, ask your nurse or doctor.            CONTINUE these medicines which have NOT CHANGED      Dose / Directions   * acetaminophen 32 mg/mL liquid  Commonly known as: TYLENOL  Used for: Seizure disorder (H), Hemiparesis, left (H), Cognitive impairment      Dose: 10.2-20.3 mL  Take 10.2-20.3 mLs by mouth every 4 hours as needed. No more than 4000mg daily  Refills: 0     * acetaminophen 325 MG tablet  Commonly known as: TYLENOL  Used for: Seizure disorder (H), Hemiparesis, left (H), Cognitive impairment      Dose: 2 tablet  Take 2 tablets by mouth 3 times daily.  Refills: 0     acetaminophen-codeine 300-30 MG tablet  Commonly known as: TYLENOL #3  Used for: Seizure disorder (H), Hemiparesis, left (H), Cognitive impairment      Dose: 1-2 tablet  Take 1-2 tablets by mouth every 4 hours as needed.  Refills: 0     Ativan 0.5 MG tablet  Generic drug: LORazepam      Dose: 1 tablet  Take 1 tablet by mouth as needed  Refills: 0     bisacodyl 10 MG suppository  Commonly known as: DULCOLAX      Dose: 1 suppository  Place 1 suppository rectally daily.  Refills: 0     Cranberry 300 MG Tabs  Used for: Seizure disorder (H), Hemiparesis, left (H), Cognitive impairment      Dose: 1 tablet  Take 1 tablet by mouth 2 times daily.  Refills: 0     CULTURELLE PO      Take by mouth daily  Refills: 0     diazepam 10 MG tablet  Commonly known as: Valium  Used for: Pain in limb, Seizure disorder (H)      Take 1 tablet 30 minutes prior to MRI.  Quantity: 1 tablet  Refills: 0     DuoNeb 0.5-2.5 (3) MG/3ML neb solution  Used for: Seizure disorder (H), Hemiparesis, left (H), Cognitive impairment  Generic drug: ipratropium - albuterol 0.5 mg/2.5 mg/3 mL      Dose: 1 ampule  Take 1 ampule by nebulization every 6 hours as needed.  Refills: 0     famotidine 20 MG tablet  Commonly  known as: PEPCID      Dose: 20 mg  Take 20 mg by mouth 2 times daily.  Refills: 0     furosemide 20 MG tablet  Commonly known as: LASIX      Dose: 20 mg  Take 20 mg by mouth daily.  Refills: 0     gabapentin 300 MG capsule  Commonly known as: NEURONTIN  Used for: Seizure disorder (H), Hemiparesis, left (H), Cognitive impairment      Dose: 300 mg  Take 1 capsule by mouth 3 times daily.  Quantity: 270 capsule  Refills: 3     levETIRAcetam 1000 MG tablet  Commonly known as: KEPPRA      Dose: 1 tablet  Take 1 tablet by mouth 2 times daily.  Refills: 0     magnesium oxide 400 MG tablet  Commonly known as: MAG-OX      Dose: 1 tablet  Take 1 tablet by mouth 2 times daily.  Refills: 0     metoprolol tartrate 25 MG tablet  Commonly known as: LOPRESSOR      Dose: 25 mg  Take 25 mg by mouth 2 times daily.  Refills: 0     naloxone 4 MG/0.1ML nasal spray  Commonly known as: NARCAN  Used for: Neuropathic pain      Dose: 4 mg  Spray 1 spray (4 mg) into one nostril alternating nostrils once as needed for opioid reversal every 2-3 minutes until assistance arrives  Quantity: 0.2 mL  Refills: 1     NutriSource Fiber packet      Dose: 1 packet  Take 1 packet by mouth daily.  Refills: 0     oxyCODONE 5 MG tablet  Commonly known as: ROXICODONE  Used for: Neuropathic pain      Dose: 5 mg  Take 1 tablet (5 mg) by mouth nightly as needed for severe pain  Quantity: 30 tablet  Refills: 0     * PARoxetine 40 MG tablet  Commonly known as: PAXIL      Dose: 40 mg  Take 40 mg by mouth every morning. 1 tablet daily in addition to 10mg tablets  Refills: 0     * PARoxetine 10 MG tablet  Commonly known as: PAXIL      Dose: 10 mg  Take 10 mg by mouth every morning. 1 tablet daily in addition to 40mg tablet.  Refills: 0     potassium chloride 20 MEQ packet  Commonly known as: KLOR-CON      Dose: 20 mEq  Take 20 mEq by mouth 2 times daily.  Refills: 0     pregabalin 75 MG capsule  Commonly known as: Lyrica  Used for: Pain in limb, Seizure disorder (H)       Dose: 75 mg  Take 1 capsule (75 mg) by mouth 2 times daily  Quantity: 60 capsule  Refills: 2     PROSOURCE PLUS PO      Dose: 30 mL  Take 30 mLs by mouth daily.  Refills: 0     sennosides 8.6 MG tablet  Commonly known as: SENOKOT      Dose: 2 tablet  Take 2 tablets by mouth daily.  Refills: 0     SEROquel 25 MG tablet  Generic drug: QUEtiapine      Dose: 25 mg  Take 25 mg by mouth daily. And 1 tablet every 6 hours prn.  Refills: 0     valproic acid 250 MG/5ML      Dose: 1,250 mg  Take 1,250 mg by mouth 3 times daily.  Refills: 0     vitamin D2 23672 units (1250 mcg) capsule  Commonly known as: ERGOCALCIFEROL      Dose: 50,000 Units  Take 50,000 Units by mouth once a week.  Refills: 0         * This list has 4 medication(s) that are the same as other medications prescribed for you. Read the directions carefully, and ask your doctor or other care provider to review them with you.               Case Management:  I have reviewed the care plan and MDS and do agree with the plan. Patient's desire to return to the community is not present. Information reviewed:  Medications, vital signs, orders, and nursing notes.    ROS:  4 point ROS including Respiratory, CV, GI and , other than that noted in the HPI,  is negative    Vitals:  There were no vitals taken for this visit.  There is no height or weight on file to calculate BMI.  Exam:  GENERAL APPEARANCE:  Alert, in no distress  NECK:  No adenopathy,masses or thyromegaly  RESP:  respiratory effort and palpation of chest normal  CV:  Palpation and auscultation of heart done , regular rate and rhythm, no murmur, rub, or gallop  NEURO:   Right-sided hemiparesis      ASSESSMENT/PLAN  (I63.9) Cerebrovascular accident (CVA), unspecified mechanism (H)  (primary encounter diagnosis)  Comment: Unchanged pain seems better  Plan:     (G81.94) Hemiparesis, left (H)  Comment:   Plan:     (J44.9) Chronic obstructive pulmonary disease, unspecified COPD type (H)  Comment: Stable  Plan:        (J96.90) Respiratory failure, unspecified chronicity, unspecified whether with hypoxia or hypercapnia (H)  Comment:   Plan:     (E11.69) Type 2 diabetes mellitus with other specified complication, without long-term current use of insulin (H)  Comment: Controlled  Plan:           Stanford Gayle MD on 5/27/2022 at 3:23 PM

## 2022-06-10 DIAGNOSIS — M79.2 NEUROPATHIC PAIN: Primary | ICD-10-CM

## 2022-06-10 RX ORDER — LIDOCAINE 50 MG/G
1 PATCH TOPICAL EVERY 24 HOURS
Qty: 10 PATCH | Refills: 0 | Status: CANCELLED | OUTPATIENT
Start: 2022-06-10

## 2022-06-10 NOTE — TELEPHONE ENCOUNTER
Reason for Call:  Medication or medication refill:    Do you use a [facility]?  Name of the pharmacy and phone number for the current request[facility]    Name of the medication requested: Lidocaine patches    Other request: pt needing refill    Can we leave a detailed message on this number? YES    Phone number patient can be reached at: Home number on file [phone] (home)    Best Time: anytime    Call taken on 6/10/2022 at 12:56 PM by Sally Rucker

## 2022-06-15 RX ORDER — LIDOCAINE 50 MG/G
1 PATCH TOPICAL EVERY 24 HOURS
Qty: 30 PATCH | Refills: 11 | Status: SHIPPED | OUTPATIENT
Start: 2022-06-15 | End: 2023-06-01

## 2022-06-15 NOTE — TELEPHONE ENCOUNTER
Prescription approved per [facility] Refill Protocol.    Last Written Prescription Date: 8/4/21  Last Fill Quantity: 30,  # refills: 11   Last office visit:  5/27/22

## 2022-06-27 ENCOUNTER — NURSING HOME VISIT (OUTPATIENT)
Dept: GERIATRICS | Facility: CLINIC | Age: 67
End: 2022-06-27
Payer: MEDICARE

## 2022-06-27 DIAGNOSIS — M79.2 NEUROPATHIC PAIN: Primary | ICD-10-CM

## 2022-06-27 PROCEDURE — 99308 SBSQ NF CARE LOW MDM 20: CPT | Performed by: FAMILY MEDICINE

## 2022-06-27 NOTE — PROGRESS NOTES
[facility] GERIATRICS  No chief complaint on file.    [place] Medical Record Number:  [id number]  Place of Service where encounter took place:  [facility] () [[id number]]    HPI:    Anna Mathew  is 67 year old (1955), who is being seen today for a federally mandated E/M visit. 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     Hemiplegia, unspecified, affecting unspecified side      Hyperglycemia 9/30/10     Mechanical complication of gastrostomy (H)      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     Other pulmonary insufficiency, not elsewhere classified 12/6/10    Hospitalized     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     Respiratory failure (H) 9/30/2010     Seizure disorder (H) 9/30 - 11/12/2010    status epilepicus hospitalized at [place]      Septic shock(785.52) (H) 5/29-6/2/11    Hospitalized @ [facility], urinary source     Thrombocytopenia (H) 5/30/11    likely due to sepsis     Type II or unspecified type diabetes mellitus without mention of complication, not stated as uncontrolled      Unspecified cause of encephalitis, myelitis, and encephalomyelitis      Vitamin D deficiency      PAST SURGICAL HISTORY:   has a past surgical history that includes Gastric/jejunostomy tube placement (11/1/2010).  FAMILY HISTORY: family history is not on file.  SOCIAL HISTORY:  reports that she quit smoking about 11 years ago. Her smoking use included cigarettes.  She smoked 1.00 pack per day. She has never used smokeless tobacco.    MEDICATIONS:     Review of your medicines          Accurate as of June 27, 2022  8:08 PM. If you have any questions, ask your nurse or doctor.            CONTINUE these medicines which have NOT CHANGED      Dose / Directions   * acetaminophen 32 mg/mL liquid  Commonly known as: TYLENOL  Used for: Seizure disorder (H), Hemiparesis, left (H), Cognitive impairment      Dose: 10.2-20.3 mL  Take 10.2-20.3 mLs by mouth every 4 hours as needed. No more than 4000mg daily  Refills: 0     * acetaminophen 325 MG tablet  Commonly known as: TYLENOL  Used for: Seizure disorder (H), Hemiparesis, left (H), Cognitive impairment      Dose: 2 tablet  Take 2 tablets by mouth 3 times daily.  Refills: 0     acetaminophen-codeine 300-30 MG tablet  Commonly known as: TYLENOL #3  Used for: Seizure disorder (H), Hemiparesis, left (H), Cognitive impairment      Dose: 1-2 tablet  Take 1-2 tablets by mouth every 4 hours as needed.  Refills: 0     Ativan 0.5 MG tablet  Generic drug: LORazepam      Dose: 1 tablet  Take 1 tablet by mouth as needed  Refills: 0     bisacodyl 10 MG suppository  Commonly known as: DULCOLAX      Dose: 1 suppository  Place 1 suppository rectally daily.  Refills: 0     Cranberry 300 MG Tabs  Used for: Seizure disorder (H), Hemiparesis, left (H), Cognitive impairment      Dose: 1 tablet  Take 1 tablet by mouth 2 times daily.  Refills: 0     CULTURELLE PO      Take by mouth daily  Refills: 0     diazepam 10 MG tablet  Commonly known as: Valium  Used for: Pain in limb, Seizure disorder (H)      Take 1 tablet 30 minutes prior to MRI.  Quantity: 1 tablet  Refills: 0     DuoNeb 0.5-2.5 (3) MG/3ML neb solution  Used for: Seizure disorder (H), Hemiparesis, left (H), Cognitive impairment  Generic drug: ipratropium - albuterol 0.5 mg/2.5 mg/3 mL      Dose: 1 ampule  Take 1 ampule by nebulization every 6 hours as needed.  Refills: 0     famotidine 20 MG  tablet  Commonly known as: PEPCID      Dose: 20 mg  Take 20 mg by mouth 2 times daily.  Refills: 0     furosemide 20 MG tablet  Commonly known as: LASIX      Dose: 20 mg  Take 20 mg by mouth daily.  Refills: 0     gabapentin 300 MG capsule  Commonly known as: NEURONTIN  Used for: Seizure disorder (H), Hemiparesis, left (H), Cognitive impairment      Dose: 300 mg  Take 1 capsule by mouth 3 times daily.  Quantity: 270 capsule  Refills: 3     levETIRAcetam 1000 MG tablet  Commonly known as: KEPPRA      Dose: 1 tablet  Take 1 tablet by mouth 2 times daily.  Refills: 0     lidocaine 5 % patch  Commonly known as: LIDODERM  Used for: Neuropathic pain      Dose: 1 patch  Place 1 patch onto the skin every 24 hours To prevent lidocaine toxicity, patient should be patch free for 12 hrs daily. On for 12 hours, off for 12 hours.  Quantity: 30 patch  Refills: 11     magnesium oxide 400 MG tablet  Commonly known as: MAG-OX      Dose: 1 tablet  Take 1 tablet by mouth 2 times daily.  Refills: 0     metoprolol tartrate 25 MG tablet  Commonly known as: LOPRESSOR      Dose: 25 mg  Take 25 mg by mouth 2 times daily.  Refills: 0     naloxone 4 MG/0.1ML nasal spray  Commonly known as: NARCAN  Used for: Neuropathic pain      Dose: 4 mg  Spray 1 spray (4 mg) into one nostril alternating nostrils once as needed for opioid reversal every 2-3 minutes until assistance arrives  Quantity: 0.2 mL  Refills: 1     NutriSource Fiber packet      Dose: 1 packet  Take 1 packet by mouth daily.  Refills: 0     oxyCODONE 5 MG tablet  Commonly known as: ROXICODONE  Used for: Neuropathic pain      Dose: 5 mg  Take 1 tablet (5 mg) by mouth nightly as needed for severe pain  Quantity: 30 tablet  Refills: 0     * PARoxetine 40 MG tablet  Commonly known as: PAXIL      Dose: 40 mg  Take 40 mg by mouth every morning. 1 tablet daily in addition to 10mg tablets  Refills: 0     * PARoxetine 10 MG tablet  Commonly known as: PAXIL      Dose: 10 mg  Take 10 mg by mouth  every morning. 1 tablet daily in addition to 40mg tablet.  Refills: 0     potassium chloride 20 MEQ packet  Commonly known as: KLOR-CON      Dose: 20 mEq  Take 20 mEq by mouth 2 times daily.  Refills: 0     pregabalin 75 MG capsule  Commonly known as: Lyrica  Used for: Pain in limb, Seizure disorder (H)      Dose: 75 mg  Take 1 capsule (75 mg) by mouth 2 times daily  Quantity: 60 capsule  Refills: 2     PROSOURCE PLUS PO      Dose: 30 mL  Take 30 mLs by mouth daily.  Refills: 0     sennosides 8.6 MG tablet  Commonly known as: SENOKOT      Dose: 2 tablet  Take 2 tablets by mouth daily.  Refills: 0     SEROquel 25 MG tablet  Generic drug: QUEtiapine      Dose: 25 mg  Take 25 mg by mouth daily. And 1 tablet every 6 hours prn.  Refills: 0     valproic acid 250 MG/5ML      Dose: 1,250 mg  Take 1,250 mg by mouth 3 times daily.  Refills: 0     vitamin D2 01592 units (1250 mcg) capsule  Commonly known as: ERGOCALCIFEROL      Dose: 50,000 Units  Take 50,000 Units by mouth once a week.  Refills: 0         * This list has 4 medication(s) that are the same as other medications prescribed for you. Read the directions carefully, and ask your doctor or other care provider to review them with you.              Case Management:  I have reviewed the care plan and MDS and do agree with the plan. Patient's desire to return to the community is not present. Information reviewed:  Medications, vital signs, orders, and nursing notes.    ROS:  Limited secondary to cognitive impairment but today pt reports increased buzzing and vibrating sensation in her lower extremities.    Vitals:  There were no vitals taken for this visit.  There is no height or weight on file to calculate BMI.  Exam:  GENERAL APPEARANCE:  Alert, in no distress  RESP:  diminished breath sounds Throughout  CV:  rate-normal  NEURO:   Hyperesthesia of the feet    Lab/Diagnostic data:       ASSESSMENT/PLAN  Neuropathic pain   Increase gabapentin to 9 mg 3 times daily and  continue to monitor symptoms            Electronically signed by:  Herson Marshall MD

## 2022-06-30 DIAGNOSIS — M79.2 NEUROPATHIC PAIN: ICD-10-CM

## 2022-06-30 RX ORDER — OXYCODONE HYDROCHLORIDE 5 MG/1
5 TABLET ORAL
Qty: 30 TABLET | Refills: 0 | Status: SHIPPED | OUTPATIENT
Start: 2022-06-30 | End: 2022-08-01

## 2022-08-01 DIAGNOSIS — M79.2 NEUROPATHIC PAIN: ICD-10-CM

## 2022-08-01 RX ORDER — OXYCODONE HYDROCHLORIDE 5 MG/1
5 TABLET ORAL
Qty: 30 TABLET | Refills: 0 | Status: SHIPPED | OUTPATIENT
Start: 2022-08-01 | End: 2022-09-07

## 2022-09-07 DIAGNOSIS — M79.2 NEUROPATHIC PAIN: ICD-10-CM

## 2022-09-07 RX ORDER — OXYCODONE HYDROCHLORIDE 5 MG/1
5 TABLET ORAL
Qty: 30 TABLET | Refills: 0 | Status: SHIPPED | OUTPATIENT
Start: 2022-09-07 | End: 2022-10-13

## 2022-09-09 ENCOUNTER — NURSING HOME VISIT (OUTPATIENT)
Dept: GERIATRICS | Facility: CLINIC | Age: 67
End: 2022-09-09
Payer: MEDICARE

## 2022-09-09 DIAGNOSIS — M79.2 NEUROPATHIC PAIN: Primary | ICD-10-CM

## 2022-09-09 DIAGNOSIS — F33.9 EPISODE OF RECURRENT MAJOR DEPRESSIVE DISORDER, UNSPECIFIED DEPRESSION EPISODE SEVERITY (H): ICD-10-CM

## 2022-09-09 DIAGNOSIS — I63.9 CEREBROVASCULAR ACCIDENT (CVA), UNSPECIFIED MECHANISM (H): ICD-10-CM

## 2022-09-09 DIAGNOSIS — H53.462 LEFT HOMONYMOUS HEMIANOPSIA: ICD-10-CM

## 2022-09-09 DIAGNOSIS — G81.94 HEMIPARESIS, LEFT (H): ICD-10-CM

## 2022-09-09 PROCEDURE — 99309 SBSQ NF CARE MODERATE MDM 30: CPT | Performed by: FAMILY MEDICINE

## 2022-09-09 NOTE — PROGRESS NOTES
[facility] GERIATRICS  No chief complaint on file.    [place] Medical Record Number:  [id number]  Place of Service where encounter took place:  [facility] ([facility]) [[id number]]    HPI:    Anna Mathew  is 67 year old (1955), who is being seen today for a federally mandated E/M visit. Today's concerns are:  Overall there is been little change.  Patient continues to struggle with her chronic pain from her stroke.    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     Hemiplegia, unspecified, affecting unspecified side      Hyperglycemia 9/30/10     Mechanical complication of gastrostomy (H)      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     Other pulmonary insufficiency, not elsewhere classified 12/6/10    Hospitalized     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     Respiratory failure (H) 9/30/2010     Seizure disorder (H) 9/30 - 11/12/2010    status epilepicus hospitalized at [place]      Septic shock(785.52) (H) 5/29-6/2/11    Hospitalized @ [facility], urinary source     Thrombocytopenia (H) 5/30/11    likely due to sepsis     Type II or unspecified type diabetes mellitus without mention of complication, not stated as uncontrolled      Unspecified cause of encephalitis, myelitis, and encephalomyelitis      Vitamin D deficiency      PAST SURGICAL HISTORY:   has a past surgical history that includes Gastric/jejunostomy tube placement (11/1/2010).  FAMILY HISTORY: family history is not on file.  SOCIAL HISTORY:  reports that she quit smoking about 11 years ago.  Her smoking use included cigarettes. She smoked 1.00 pack per day. She has never used smokeless tobacco.    MEDICATIONS:    Post Medication Reconciliation Status:             Review of your medicines          Accurate as of September 9, 2022  8:59 AM. If you have any questions, ask your nurse or doctor.            CONTINUE these medicines which have NOT CHANGED      Dose / Directions   * acetaminophen 32 mg/mL liquid  Commonly known as: TYLENOL  Used for: Seizure disorder (H), Hemiparesis, left (H), Cognitive impairment      Dose: 10.2-20.3 mL  Take 10.2-20.3 mLs by mouth every 4 hours as needed. No more than 4000mg daily  Refills: 0     * acetaminophen 325 MG tablet  Commonly known as: TYLENOL  Used for: Seizure disorder (H), Hemiparesis, left (H), Cognitive impairment      Dose: 2 tablet  Take 2 tablets by mouth 3 times daily.  Refills: 0     acetaminophen-codeine 300-30 MG tablet  Commonly known as: TYLENOL #3  Used for: Seizure disorder (H), Hemiparesis, left (H), Cognitive impairment      Dose: 1-2 tablet  Take 1-2 tablets by mouth every 4 hours as needed.  Refills: 0     Ativan 0.5 MG tablet  Generic drug: LORazepam      Dose: 1 tablet  Take 1 tablet by mouth as needed  Refills: 0     bisacodyl 10 MG suppository  Commonly known as: DULCOLAX      Dose: 1 suppository  Place 1 suppository rectally daily.  Refills: 0     Cranberry 300 MG Tabs  Used for: Seizure disorder (H), Hemiparesis, left (H), Cognitive impairment      Dose: 1 tablet  Take 1 tablet by mouth 2 times daily.  Refills: 0     CULTURELLE PO      Take by mouth daily  Refills: 0     diazepam 10 MG tablet  Commonly known as: Valium  Used for: Pain in limb, Seizure disorder (H)      Take 1 tablet 30 minutes prior to MRI.  Quantity: 1 tablet  Refills: 0     DuoNeb 0.5-2.5 (3) MG/3ML neb solution  Used for: Seizure disorder (H), Hemiparesis, left (H), Cognitive impairment  Generic drug: ipratropium - albuterol 0.5 mg/2.5 mg/3 mL      Dose: 1 ampule  Take  1 ampule by nebulization every 6 hours as needed.  Refills: 0     famotidine 20 MG tablet  Commonly known as: PEPCID      Dose: 20 mg  Take 20 mg by mouth 2 times daily.  Refills: 0     furosemide 20 MG tablet  Commonly known as: LASIX      Dose: 20 mg  Take 20 mg by mouth daily.  Refills: 0     gabapentin 300 MG capsule  Commonly known as: NEURONTIN  Used for: Seizure disorder (H), Hemiparesis, left (H), Cognitive impairment      Dose: 300 mg  Take 1 capsule by mouth 3 times daily.  Quantity: 270 capsule  Refills: 3     levETIRAcetam 1000 MG tablet  Commonly known as: KEPPRA      Dose: 1 tablet  Take 1 tablet by mouth 2 times daily.  Refills: 0     lidocaine 5 % patch  Commonly known as: LIDODERM  Used for: Neuropathic pain      Dose: 1 patch  Place 1 patch onto the skin every 24 hours To prevent lidocaine toxicity, patient should be patch free for 12 hrs daily. On for 12 hours, off for 12 hours.  Quantity: 30 patch  Refills: 11     magnesium oxide 400 MG tablet  Commonly known as: MAG-OX      Dose: 1 tablet  Take 1 tablet by mouth 2 times daily.  Refills: 0     metoprolol tartrate 25 MG tablet  Commonly known as: LOPRESSOR      Dose: 25 mg  Take 25 mg by mouth 2 times daily.  Refills: 0     naloxone 4 MG/0.1ML nasal spray  Commonly known as: NARCAN  Used for: Neuropathic pain      Dose: 4 mg  Spray 1 spray (4 mg) into one nostril alternating nostrils once as needed for opioid reversal every 2-3 minutes until assistance arrives  Quantity: 0.2 mL  Refills: 1     NutriSource Fiber packet      Dose: 1 packet  Take 1 packet by mouth daily.  Refills: 0     oxyCODONE 5 MG tablet  Commonly known as: ROXICODONE  Used for: Neuropathic pain      Dose: 5 mg  Take 1 tablet (5 mg) by mouth nightly as needed for severe pain  Quantity: 30 tablet  Refills: 0     * PARoxetine 40 MG tablet  Commonly known as: PAXIL      Dose: 40 mg  Take 40 mg by mouth every morning. 1 tablet daily in addition to 10mg tablets  Refills: 0     *  PARoxetine 10 MG tablet  Commonly known as: PAXIL      Dose: 10 mg  Take 10 mg by mouth every morning. 1 tablet daily in addition to 40mg tablet.  Refills: 0     potassium chloride 20 MEQ packet  Commonly known as: KLOR-CON      Dose: 20 mEq  Take 20 mEq by mouth 2 times daily.  Refills: 0     pregabalin 75 MG capsule  Commonly known as: Lyrica  Used for: Pain in limb, Seizure disorder (H)      Dose: 75 mg  Take 1 capsule (75 mg) by mouth 2 times daily  Quantity: 60 capsule  Refills: 2     PROSOURCE PLUS PO      Dose: 30 mL  Take 30 mLs by mouth daily.  Refills: 0     sennosides 8.6 MG tablet  Commonly known as: SENOKOT      Dose: 2 tablet  Take 2 tablets by mouth daily.  Refills: 0     SEROquel 25 MG tablet  Generic drug: QUEtiapine      Dose: 25 mg  Take 25 mg by mouth daily. And 1 tablet every 6 hours prn.  Refills: 0     valproic acid 250 MG/5ML      Dose: 1,250 mg  Take 1,250 mg by mouth 3 times daily.  Refills: 0     vitamin D2 63451 units (1250 mcg) capsule  Commonly known as: ERGOCALCIFEROL      Dose: 50,000 Units  Take 50,000 Units by mouth once a week.  Refills: 0         * This list has 4 medication(s) that are the same as other medications prescribed for you. Read the directions carefully, and ask your doctor or other care provider to review them with you.                 Case Management:  I have reviewed the care plan and MDS and do agree with the plan. Patient's desire to return to the community is present, but is not able due to care needs . Information reviewed:  Medications, vital signs, orders, and nursing notes.    ROS:  4 point ROS including Respiratory, CV, GI and , other than that noted in the HPI,  is negative    Vitals:  There were no vitals taken for this visit.  There is no height or weight on file to calculate BMI.  Exam:  GENERAL APPEARANCE:  Alert, in no distress  RESP:  respiratory effort and palpation of chest normal  CV:  Palpation and auscultation of heart done , regular rate and  rhythm, no murmur, rub, or gallop  NEURO:   Hemiparesis right side    Lab/Diagnostic data:       ASSESSMENT/PLAN  (M79.2) Neuropathic pain  (primary encounter diagnosis)  Comment: From her stroke continues with nighttime narcotics  Plan:     (H53.462) Left homonymous hemianopsia  Comment: Unchanged  Plan:     (G81.94) Hemiparesis, left (H)  Comment: Unchanged  Plan:     (I63.9) Cerebrovascular accident (CVA), unspecified mechanism (H)  Comment:   Plan:     (F33.9) Episode of recurrent major depressive disorder, unspecified depression episode severity (H)  Comment: Stable on medication  Plan:         Stanford Gayle MD on 9/9/2022 at 12:52 PM

## 2022-10-13 DIAGNOSIS — M79.2 NEUROPATHIC PAIN: ICD-10-CM

## 2022-10-13 RX ORDER — OXYCODONE HYDROCHLORIDE 5 MG/1
5 TABLET ORAL
Qty: 30 TABLET | Refills: 0 | Status: SHIPPED | OUTPATIENT
Start: 2022-10-13 | End: 2022-12-02

## 2022-12-01 ENCOUNTER — NURSING HOME VISIT (OUTPATIENT)
Dept: GERIATRICS | Facility: CLINIC | Age: 67
End: 2022-12-01
Payer: MEDICARE

## 2022-12-01 DIAGNOSIS — G81.94 HEMIPARESIS, LEFT (H): ICD-10-CM

## 2022-12-01 DIAGNOSIS — I63.9 CEREBROVASCULAR ACCIDENT (CVA), UNSPECIFIED MECHANISM (H): Primary | ICD-10-CM

## 2022-12-01 DIAGNOSIS — R41.89 COGNITIVE IMPAIRMENT: ICD-10-CM

## 2022-12-01 DIAGNOSIS — M79.2 NEUROPATHIC PAIN: ICD-10-CM

## 2022-12-01 DIAGNOSIS — F33.9 EPISODE OF RECURRENT MAJOR DEPRESSIVE DISORDER, UNSPECIFIED DEPRESSION EPISODE SEVERITY (H): ICD-10-CM

## 2022-12-01 PROCEDURE — 99309 SBSQ NF CARE MODERATE MDM 30: CPT | Performed by: FAMILY MEDICINE

## 2022-12-01 NOTE — PROGRESS NOTES
[facility] GERIATRICS  No chief complaint on file.    [place] Medical Record Number:  [id number]  Place of Service where encounter took place:  No question data found.    HPI:    Anna Mathew  is 67 year old (1955), who is being seen today for a federally mandated E/M visit. 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     Hemiplegia, unspecified, affecting unspecified side      Hyperglycemia 9/30/10     Mechanical complication of gastrostomy (H)      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     Other pulmonary insufficiency, not elsewhere classified 12/6/10    Hospitalized     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     Respiratory failure (H) 9/30/2010     Seizure disorder (H) 9/30 - 11/12/2010    status epilepicus hospitalized at [place]      Septic shock(785.52) (H) 5/29-6/2/11    Hospitalized @ [facility], urinary source     Thrombocytopenia (H) 5/30/11    likely due to sepsis     Type II or unspecified type diabetes mellitus without mention of complication, not stated as uncontrolled      Unspecified cause of encephalitis, myelitis, and encephalomyelitis      Vitamin D deficiency      PAST SURGICAL HISTORY:   has a past surgical history that includes Gastric/jejunostomy tube placement (11/1/2010).  FAMILY HISTORY: family history is not on file.  SOCIAL HISTORY:  reports that she quit smoking about 11 years ago. Her  smoking use included cigarettes. She smoked an average of 1 pack per day. She has never used smokeless tobacco.    MEDICATIONS:  MED REC REQUIRED  Post Medication Reconciliation Status:          Review of your medicines          Accurate as of December 1, 2022  7:27 AM. If you have any questions, ask your nurse or doctor.            CONTINUE these medicines which have NOT CHANGED      Dose / Directions   * acetaminophen 32 mg/mL liquid  Commonly known as: TYLENOL  Used for: Seizure disorder (H), Hemiparesis, left (H), Cognitive impairment      Dose: 10.2-20.3 mL  Take 10.2-20.3 mLs by mouth every 4 hours as needed. No more than 4000mg daily  Refills: 0     * acetaminophen 325 MG tablet  Commonly known as: TYLENOL  Used for: Seizure disorder (H), Hemiparesis, left (H), Cognitive impairment      Dose: 2 tablet  Take 2 tablets by mouth 3 times daily.  Refills: 0     acetaminophen-codeine 300-30 MG tablet  Commonly known as: TYLENOL #3  Used for: Seizure disorder (H), Hemiparesis, left (H), Cognitive impairment      Dose: 1-2 tablet  Take 1-2 tablets by mouth every 4 hours as needed.  Refills: 0     Ativan 0.5 MG tablet  Generic drug: LORazepam      Dose: 1 tablet  Take 1 tablet by mouth as needed  Refills: 0     bisacodyl 10 MG suppository  Commonly known as: DULCOLAX      Dose: 1 suppository  Place 1 suppository rectally daily.  Refills: 0     Cranberry 300 MG Tabs  Used for: Seizure disorder (H), Hemiparesis, left (H), Cognitive impairment      Dose: 1 tablet  Take 1 tablet by mouth 2 times daily.  Refills: 0     CULTURELLE PO      Take by mouth daily  Refills: 0     diazepam 10 MG tablet  Commonly known as: Valium  Used for: Pain in limb, Seizure disorder (H)      Take 1 tablet 30 minutes prior to MRI.  Quantity: 1 tablet  Refills: 0     DuoNeb 0.5-2.5 (3) MG/3ML neb solution  Used for: Seizure disorder (H), Hemiparesis, left (H), Cognitive impairment  Generic drug: ipratropium - albuterol 0.5 mg/2.5 mg/3 mL       Dose: 1 ampule  Take 1 ampule by nebulization every 6 hours as needed.  Refills: 0     famotidine 20 MG tablet  Commonly known as: PEPCID      Dose: 20 mg  Take 20 mg by mouth 2 times daily.  Refills: 0     furosemide 20 MG tablet  Commonly known as: LASIX      Dose: 20 mg  Take 20 mg by mouth daily.  Refills: 0     gabapentin 300 MG capsule  Commonly known as: NEURONTIN  Used for: Seizure disorder (H), Hemiparesis, left (H), Cognitive impairment      Dose: 300 mg  Take 1 capsule by mouth 3 times daily.  Quantity: 270 capsule  Refills: 3     levETIRAcetam 1000 MG tablet  Commonly known as: KEPPRA      Dose: 1 tablet  Take 1 tablet by mouth 2 times daily.  Refills: 0     lidocaine 5 % patch  Commonly known as: LIDODERM  Used for: Neuropathic pain      Dose: 1 patch  Place 1 patch onto the skin every 24 hours To prevent lidocaine toxicity, patient should be patch free for 12 hrs daily. On for 12 hours, off for 12 hours.  Quantity: 30 patch  Refills: 11     magnesium oxide 400 MG tablet  Commonly known as: MAG-OX      Dose: 1 tablet  Take 1 tablet by mouth 2 times daily.  Refills: 0     metoprolol tartrate 25 MG tablet  Commonly known as: LOPRESSOR      Dose: 25 mg  Take 25 mg by mouth 2 times daily.  Refills: 0     naloxone 4 MG/0.1ML nasal spray  Commonly known as: NARCAN  Used for: Neuropathic pain      Dose: 4 mg  Spray 1 spray (4 mg) into one nostril alternating nostrils once as needed for opioid reversal every 2-3 minutes until assistance arrives  Quantity: 0.2 mL  Refills: 1     NutriSource Fiber packet      Dose: 1 packet  Take 1 packet by mouth daily.  Refills: 0     oxyCODONE 5 MG tablet  Commonly known as: ROXICODONE  Used for: Neuropathic pain      Dose: 5 mg  Take 1 tablet (5 mg) by mouth nightly as needed for severe pain  Quantity: 30 tablet  Refills: 0     * PARoxetine 40 MG tablet  Commonly known as: PAXIL      Dose: 40 mg  Take 40 mg by mouth every morning. 1 tablet daily in addition to 10mg  tablets  Refills: 0     * PARoxetine 10 MG tablet  Commonly known as: PAXIL      Dose: 10 mg  Take 10 mg by mouth every morning. 1 tablet daily in addition to 40mg tablet.  Refills: 0     potassium chloride 20 MEQ packet  Commonly known as: KLOR-CON      Dose: 20 mEq  Take 20 mEq by mouth 2 times daily.  Refills: 0     pregabalin 75 MG capsule  Commonly known as: Lyrica  Used for: Pain in limb, Seizure disorder (H)      Dose: 75 mg  Take 1 capsule (75 mg) by mouth 2 times daily  Quantity: 60 capsule  Refills: 2     PROSOURCE PLUS PO      Dose: 30 mL  Take 30 mLs by mouth daily.  Refills: 0     sennosides 8.6 MG tablet  Commonly known as: SENOKOT      Dose: 2 tablet  Take 2 tablets by mouth daily.  Refills: 0     SEROquel 25 MG tablet  Generic drug: QUEtiapine      Dose: 25 mg  Take 25 mg by mouth daily. And 1 tablet every 6 hours prn.  Refills: 0     valproic acid 250 MG/5ML      Dose: 1,250 mg  Take 1,250 mg by mouth 3 times daily.  Refills: 0     vitamin D2 70055 units (1250 mcg) capsule  Commonly known as: ERGOCALCIFEROL      Dose: 50,000 Units  Take 50,000 Units by mouth once a week.  Refills: 0         * This list has 4 medication(s) that are the same as other medications prescribed for you. Read the directions carefully, and ask your doctor or other care provider to review them with you.                 Case Management:  I have reviewed the care plan and MDS and do agree with the plan. Patient's desire to return to the community is present, but is not able due to care needs . Information reviewed:  Medications, vital signs, orders, and nursing notes.    ROS:  4 point ROS including Respiratory, CV, GI and , other than that noted in the HPI,  is negative    Vitals:  There were no vitals taken for this visit.  There is no height or weight on file to calculate BMI.  Exam:  GENERAL APPEARANCE:  Alert, in no distress  NEURO:   Left-sided hemiparesis    Lab/Diagnostic data:       ASSESSMENT/PLAN  (I63.9)  Cerebrovascular accident (CVA), unspecified mechanism (H)  (primary encounter diagnosis)  Comment: Unchanged continue with routine care  Plan:     (R41.89) Cognitive impairment  Comment: As above  Plan:     (G81.94) Hemiparesis, left (H)  Comment: As above  Plan:     (F33.9) Episode of recurrent major depressive disorder, unspecified depression episode severity (H)  Comment: Stable  Plan:           Stanford Gayle MD on 12/1/2022 at 8:55 AM

## 2022-12-02 RX ORDER — OXYCODONE HYDROCHLORIDE 5 MG/1
5 TABLET ORAL
Qty: 30 TABLET | Refills: 0 | Status: SHIPPED | OUTPATIENT
Start: 2022-12-02 | End: 2023-01-02

## 2023-01-02 DIAGNOSIS — M79.2 NEUROPATHIC PAIN: ICD-10-CM

## 2023-01-02 RX ORDER — OXYCODONE HYDROCHLORIDE 5 MG/1
5 TABLET ORAL
Qty: 30 TABLET | Refills: 0 | Status: SHIPPED | OUTPATIENT
Start: 2023-01-02 | End: 2023-02-13

## 2023-02-06 ENCOUNTER — NURSING HOME VISIT (OUTPATIENT)
Dept: GERIATRICS | Facility: CLINIC | Age: 68
End: 2023-02-06
Payer: MEDICARE

## 2023-02-06 DIAGNOSIS — G81.94 HEMIPARESIS, LEFT (H): ICD-10-CM

## 2023-02-06 DIAGNOSIS — E11.69 TYPE 2 DIABETES MELLITUS WITH OTHER SPECIFIED COMPLICATION, WITHOUT LONG-TERM CURRENT USE OF INSULIN (H): ICD-10-CM

## 2023-02-06 DIAGNOSIS — F33.9 EPISODE OF RECURRENT MAJOR DEPRESSIVE DISORDER, UNSPECIFIED DEPRESSION EPISODE SEVERITY (H): Primary | ICD-10-CM

## 2023-02-06 DIAGNOSIS — J44.9 CHRONIC OBSTRUCTIVE PULMONARY DISEASE, UNSPECIFIED COPD TYPE (H): ICD-10-CM

## 2023-02-06 PROCEDURE — 99308 SBSQ NF CARE LOW MDM 20: CPT | Performed by: FAMILY MEDICINE

## 2023-02-06 NOTE — PROGRESS NOTES
[facility] GERIATRICS    Chief complaint: Pain and spasm    HPI:  Anna Mathew is a 67 year old  (1955), who is being seen today for an episodic care visit at: [facility] ([facility]) [[id number]]. Today's concern is: Resident has continued complaints of extremity pain and spasm.  This is a longstanding problem for her.  She has history of acute CVA.  She has prescriptions for oxycodone.  She is also on pregabalin    Allergies, and PMH/PSH reviewed in EPIC today.  REVIEW OF SYSTEMS:  4 point ROS including Respiratory, CV, GI and , other than that noted in the HPI,  is negative    Objective:   There were no vitals taken for this visit.  GENERAL APPEARANCE:  Alert, in no distress  RESP:  no respiratory distress  CV:  rate-normal  PSYCH:  oriented X 3      Assessment/Plan:     Episode of recurrent major depressive disorder, unspecified depression episode severity (H)  Hemiparesis, left (H)  Chronic obstructive pulmonary disease, unspecified COPD type (H)  Type 2 diabetes mellitus with other specified complication, without long-term current use of insulin (H)       MED REC REQUIRED  Post Medication Reconciliation Status: patient was not discharged from an inpatient facility or TCU      Orders:  Add tizanidine 2 mg 3 times daily, continue current medications and follow-up if this is not improving her symptoms    Electronically signed by: Herson Marshall MD

## 2023-02-13 DIAGNOSIS — M79.2 NEUROPATHIC PAIN: ICD-10-CM

## 2023-02-13 RX ORDER — OXYCODONE HYDROCHLORIDE 5 MG/1
5 TABLET ORAL
Qty: 30 TABLET | Refills: 0 | Status: SHIPPED | OUTPATIENT
Start: 2023-02-13 | End: 2023-03-20

## 2023-02-27 ENCOUNTER — NURSING HOME VISIT (OUTPATIENT)
Dept: GERIATRICS | Facility: CLINIC | Age: 68
End: 2023-02-27
Payer: MEDICARE

## 2023-02-27 DIAGNOSIS — J44.1 CHRONIC OBSTRUCTIVE PULMONARY DISEASE WITH ACUTE EXACERBATION (H): Primary | ICD-10-CM

## 2023-02-27 PROCEDURE — 99309 SBSQ NF CARE MODERATE MDM 30: CPT | Performed by: FAMILY MEDICINE

## 2023-02-27 NOTE — PROGRESS NOTES
[facility] GERIATRICS    Chief complaint: Dyspnea    HPI:  Anna Mathew is a 67 year old  (1955), who is being seen today for an episodic care visit at: [facility] ([facility]) [[id number]]. Today's concern is: Increasing shortness of breath.  Symptoms not responding to albuterol nebs.    Allergies, and PMH/PSH reviewed in EPIC today.  REVIEW OF SYSTEMS:  Unobtainable secondary to cognitive impairment.     Objective:   There were no vitals taken for this visit.  GENERAL APPEARANCE:  Alert, in no distress  RESP:  expiratory wheezes  CV:  Palpation and auscultation of heart done , regular rate and rhythm, no murmur, rub, or gallop, rate-normal  PSYCH:  memory impaired       Assessment/Plan:  1. Chronic obstructive pulmonary disease with acute exacerbation (H)      Prednisone 20 g daily for 4 days, 10 mg daily for 4 days   tizanidine has been increased to 4 mg every 8 hours    MED REC REQUIRED  Post Medication Reconciliation Status: patient was not discharged from an inpatient facility or TCU      Orders:      Electronically signed by: Herson Marshall MD

## 2023-03-16 ENCOUNTER — NURSING HOME VISIT (OUTPATIENT)
Dept: GERIATRICS | Facility: CLINIC | Age: 68
End: 2023-03-16
Payer: MEDICARE

## 2023-03-16 DIAGNOSIS — G40.909 SEIZURE DISORDER (H): ICD-10-CM

## 2023-03-16 DIAGNOSIS — I63.9 CEREBROVASCULAR ACCIDENT (CVA), UNSPECIFIED MECHANISM (H): Primary | ICD-10-CM

## 2023-03-16 DIAGNOSIS — J44.1 CHRONIC OBSTRUCTIVE PULMONARY DISEASE WITH ACUTE EXACERBATION (H): ICD-10-CM

## 2023-03-16 DIAGNOSIS — F33.9 EPISODE OF RECURRENT MAJOR DEPRESSIVE DISORDER, UNSPECIFIED DEPRESSION EPISODE SEVERITY (H): ICD-10-CM

## 2023-03-16 DIAGNOSIS — M79.2 NEUROPATHIC PAIN: ICD-10-CM

## 2023-03-16 DIAGNOSIS — E11.69 TYPE 2 DIABETES MELLITUS WITH OTHER SPECIFIED COMPLICATION, WITHOUT LONG-TERM CURRENT USE OF INSULIN (H): ICD-10-CM

## 2023-03-16 PROCEDURE — 99309 SBSQ NF CARE MODERATE MDM 30: CPT | Performed by: FAMILY MEDICINE

## 2023-03-16 NOTE — PROGRESS NOTES
[facility] GERIATRICS    No chief complaint on file.    HPI:  Anna Mathew is a 67 year old  (1955), who is being seen today for an episodic care visit at: [facility] ([facility]) [[id number]]. Today's concern is: Patient is requesting medication review  She notes that the tizanidine made her feel really weird.  Last dose was day before yesterday she notes she still does not quite feel right.  No blurry vision double vision no chest pain.  She has been on prednisone for a flare of her COPD.  Breathing is better.  Allergies, and PMH/PSH reviewed in EPIC today.  REVIEW OF SYSTEMS:  4 point ROS including Respiratory, CV, GI and , other than that noted in the HPI,  is negative    Objective:   There were no vitals taken for this visit.  GENERAL APPEARANCE:  Alert, in no distress  NEURO:   Left-sided hemiparesis  Lungs clear heart regular rate rhythm      Assessment/Plan:  (I63.9) Cerebrovascular accident (CVA), unspecified mechanism (H)  (primary encounter diagnosis)  Comment: Unchanged  Plan: Given her symptoms we will stop tizanidine    (F33.9) Episode of recurrent major depressive disorder, unspecified depression episode severity (H)  Comment: Stable  Plan:     (J44.1) Chronic obstructive pulmonary disease with acute exacerbation (H)  Comment: Stable  Plan: We will add Flovent given the recent exacerbation continue on Incruse    (E11.69) Type 2 diabetes mellitus with other specified complication, without long-term current use of insulin (H)  Comment: Stable  Plan: Due for yearly labs    (M79.2) Neuropathic pain  Comment: Unchanged  Plan:     (G40.909) Seizure disorder (H)  Comment: No recent seizures  Plan:         MED REC REQUIRED medications reviewed  Post Medication Reconciliation Status:       Stanford Gayle MD on 3/16/2023 at 8:01 AM

## 2023-03-20 DIAGNOSIS — M79.2 NEUROPATHIC PAIN: ICD-10-CM

## 2023-03-20 RX ORDER — OXYCODONE HYDROCHLORIDE 5 MG/1
5 TABLET ORAL
Qty: 30 TABLET | Refills: 0 | Status: SHIPPED | OUTPATIENT
Start: 2023-03-20 | End: 2023-05-02

## 2023-03-22 ENCOUNTER — TELEPHONE (OUTPATIENT)
Dept: FAMILY MEDICINE | Facility: CLINIC | Age: 68
End: 2023-03-22
Payer: MEDICARE

## 2023-03-22 NOTE — TELEPHONE ENCOUNTER
Call from Mag    Faxed over form-stop date on [employer]. This has been faxed 2x.    [phone] Mag Sexton on 3/22/2023 at 8:04 AM

## 2023-04-21 ENCOUNTER — NURSING HOME VISIT (OUTPATIENT)
Dept: GERIATRICS | Facility: CLINIC | Age: 68
End: 2023-04-21
Payer: MEDICARE

## 2023-04-21 DIAGNOSIS — E11.69 TYPE 2 DIABETES MELLITUS WITH OTHER SPECIFIED COMPLICATION, WITHOUT LONG-TERM CURRENT USE OF INSULIN (H): ICD-10-CM

## 2023-04-21 DIAGNOSIS — G81.94 HEMIPARESIS, LEFT (H): ICD-10-CM

## 2023-04-21 DIAGNOSIS — M79.2 NEUROPATHIC PAIN: Primary | ICD-10-CM

## 2023-04-21 DIAGNOSIS — G40.909 SEIZURE DISORDER (H): ICD-10-CM

## 2023-04-21 DIAGNOSIS — H53.462 LEFT HOMONYMOUS HEMIANOPSIA: ICD-10-CM

## 2023-04-21 DIAGNOSIS — R41.89 COGNITIVE IMPAIRMENT: ICD-10-CM

## 2023-04-21 DIAGNOSIS — F33.9 EPISODE OF RECURRENT MAJOR DEPRESSIVE DISORDER, UNSPECIFIED DEPRESSION EPISODE SEVERITY (H): ICD-10-CM

## 2023-04-21 PROCEDURE — 99308 SBSQ NF CARE LOW MDM 20: CPT | Performed by: FAMILY MEDICINE

## 2023-04-21 RX ORDER — CARISOPRODOL 250 MG/1
250 TABLET ORAL 2 TIMES DAILY PRN
Qty: 60 TABLET | Refills: 5 | Status: SHIPPED | OUTPATIENT
Start: 2023-04-21 | End: 2023-06-01

## 2023-04-21 NOTE — PROGRESS NOTES
[facility] GERIATRICS    No chief complaint on file.    HPI:  Anna Mathew is a 67 year old  (1955), who is being seen today for an episodic care visit at: [facility] ([facility]) [[id number]]. Today's concern is: Asked to see patient today for for acute visit for her chronic pain    Allergies, and PMH/PSH reviewed in EPIC today.  REVIEW OF SYSTEMS:  4 point ROS including Respiratory, CV, GI and , other than that noted in the HPI,  is negative    Objective:   There were no vitals taken for this visit.  GENERAL APPEARANCE:  Alert, in no distress  NEURO:   Left-sided hemiparesis        Assessment/Plan:  (M79.2) Neuropathic pain  (primary encounter diagnosis)  Comment:soma prn  Plan:     (E11.69) Type 2 diabetes mellitus with other specified complication, without long-term current use of insulin (H)  Comment:   Plan:     (G40.909) Seizure disorder (H)  Comment:   Plan:     (H53.462) Left homonymous hemianopsia  Comment:   Plan:     (R41.89) Cognitive impairment  Comment:   Plan:     (G81.94) Hemiparesis, left (H)  Comment:   Plan:   (F33.9) Episode of recurrent major depressive disorder, unspecified depression episode severity (H)  Comment:   Plan:         MED REC REQUIRED  reviewed    Stanford Gayle MD on 4/21/2023 at 10:48 AM

## 2023-05-02 DIAGNOSIS — M79.2 NEUROPATHIC PAIN: ICD-10-CM

## 2023-05-02 RX ORDER — OXYCODONE HYDROCHLORIDE 5 MG/1
5 TABLET ORAL
Qty: 30 TABLET | Refills: 0 | Status: SHIPPED | OUTPATIENT
Start: 2023-05-02 | End: 2023-06-09

## 2023-06-01 DIAGNOSIS — M79.2 NEUROPATHIC PAIN: ICD-10-CM

## 2023-06-01 RX ORDER — CARISOPRODOL 250 MG/1
250 TABLET ORAL 2 TIMES DAILY PRN
Qty: 60 TABLET | Refills: 5 | Status: SHIPPED | OUTPATIENT
Start: 2023-06-01 | End: 2023-08-24

## 2023-06-01 RX ORDER — LIDOCAINE 50 MG/G
1 PATCH TOPICAL EVERY 24 HOURS
Qty: 30 PATCH | Refills: 11 | Status: SHIPPED | OUTPATIENT
Start: 2023-06-01

## 2023-06-09 DIAGNOSIS — M79.2 NEUROPATHIC PAIN: ICD-10-CM

## 2023-06-09 RX ORDER — OXYCODONE HYDROCHLORIDE 5 MG/1
5 TABLET ORAL
Qty: 30 TABLET | Refills: 0 | Status: SHIPPED | OUTPATIENT
Start: 2023-06-09 | End: 2023-07-07

## 2023-06-12 ENCOUNTER — OFFICE VISIT (OUTPATIENT)
Dept: FAMILY MEDICINE | Facility: CLINIC | Age: 68
End: 2023-06-12
Payer: MEDICARE

## 2023-06-12 VITALS
SYSTOLIC BLOOD PRESSURE: 120 MMHG | TEMPERATURE: 98.4 F | OXYGEN SATURATION: 94 % | DIASTOLIC BLOOD PRESSURE: 70 MMHG | RESPIRATION RATE: 12 BRPM | HEART RATE: 98 BPM

## 2023-06-12 DIAGNOSIS — H61.21 IMPACTED CERUMEN OF RIGHT EAR: Primary | ICD-10-CM

## 2023-06-12 PROCEDURE — 69209 REMOVE IMPACTED EAR WAX UNI: CPT | Mod: RT

## 2023-06-12 RX ORDER — IBUPROFEN 200 MG
200 TABLET ORAL EVERY 6 HOURS PRN
COMMUNITY

## 2023-06-12 RX ORDER — ASPIRIN 81 MG/1
81 TABLET ORAL DAILY
COMMUNITY

## 2023-06-12 RX ORDER — ALBUTEROL SULFATE 90 UG/1
AEROSOL, METERED RESPIRATORY (INHALATION)
COMMUNITY
Start: 2022-12-20

## 2023-06-12 RX ORDER — BUPROPION HYDROCHLORIDE 75 MG/1
150 TABLET ORAL DAILY
COMMUNITY
Start: 2023-04-01

## 2023-06-12 RX ORDER — DULOXETIN HYDROCHLORIDE 60 MG/1
60 CAPSULE, DELAYED RELEASE ORAL DAILY
COMMUNITY
Start: 2023-03-31

## 2023-06-12 RX ORDER — FLUTICASONE FUROATE 100 UG/1
POWDER RESPIRATORY (INHALATION)
COMMUNITY
Start: 2023-04-11

## 2023-06-12 RX ORDER — POLYETHYLENE GLYCOL 3350 17 G/17G
1 POWDER, FOR SOLUTION ORAL DAILY
COMMUNITY

## 2023-06-12 ASSESSMENT — PATIENT HEALTH QUESTIONNAIRE - PHQ9
SUM OF ALL RESPONSES TO PHQ QUESTIONS 1-9: 8
SUM OF ALL RESPONSES TO PHQ QUESTIONS 1-9: 8
10. IF YOU CHECKED OFF ANY PROBLEMS, HOW DIFFICULT HAVE THESE PROBLEMS MADE IT FOR YOU TO DO YOUR WORK, TAKE CARE OF THINGS AT HOME, OR GET ALONG WITH OTHER PEOPLE: SOMEWHAT DIFFICULT

## 2023-06-12 NOTE — PROGRESS NOTES
Assessment & Plan     Impacted cerumen of right ear  Right ear with cerumen impaction.  Ear lavage is performed with room temperature water.  Post lavage ear canal is clear with TM pearly gray and landmarks visible.  Patient reports hearing increased post lavage.  - UT REMOVAL IMPACTED CERUMEN IRRIGATION/LVG UNILAT                 EILEEN Alonso CNP [facility] [place]    Junior Castillo is a 68 year old, presenting for the following health issues:  Ear Problem (Trouble hearing - possible ear lavage)        6/12/2023    10:02 AM   Additional Questions   Roomed by Maricruz Castillo is a 68-year-old female to clinic via wheelchair accompanied by her  from [facility].   waits in lobby during visit.  He is here with complaints of decreased hearing in right ear that she feels is likely related to impacted cerumen.  She has not had ear lavage performed in the past.  She denies any other concerns today.    She does have questions regarding cancer screening as she had cervical cancer previously.  We discussed recommended screenings and patient would be recommended to have colonoscopy which she is not amenable to    History of Present Illness       Reason for visit:  Ears  Symptom onset:  3-4 weeks ago  Symptoms include:  Trouble hearing  Symptom intensity:  Moderate  Symptom progression:  Staying the same  Had these symptoms before:  No     Today's PHQ-9         PHQ-9 Total Score: 8    PHQ-9 Q9 Thoughts of better off dead/self-harm past 2 weeks :   Not at all    How difficult have these problems made it for you to do your work, take care of things at home, or get along with other people: Somewhat difficult               Review of Systems   Constitutional, HEENT, cardiovascular, pulmonary, gi and gu systems are negative, except as otherwise noted.      Objective    /70 (BP Location: Right arm, Patient Position: Sitting, Cuff Size: Adult Large)   Pulse 98    Temp 98.4  F (36.9  C) (Oral)   Resp 12   LMP  (LMP Unknown)   SpO2 94%   There is no height or weight on file to calculate BMI.  Physical Exam   GENERAL: healthy, alert and no distress  EYES: Eyes grossly normal to inspection, PERRL and conjunctivae and sclerae normal  HENT: ear canals and TM's normal, nose and mouth without ulcers or lesions  HENT: normal cephalic/atraumatic, right ear: normal: no effusions, no erythema, normal landmarks and occluded with wax, left ear: normal: no effusions, no erythema, normal landmarks, oropharynx clear, oral mucous membranes moist and Exam to right ear post cerumen disimpaction with lavage.  NECK: no adenopathy, no asymmetry, masses, or scars and thyroid normal to palpation

## 2023-07-07 ENCOUNTER — NURSING HOME VISIT (OUTPATIENT)
Dept: GERIATRICS | Facility: CLINIC | Age: 68
End: 2023-07-07
Payer: MEDICARE

## 2023-07-07 DIAGNOSIS — G81.94 HEMIPARESIS, LEFT (H): ICD-10-CM

## 2023-07-07 DIAGNOSIS — F33.9 EPISODE OF RECURRENT MAJOR DEPRESSIVE DISORDER, UNSPECIFIED DEPRESSION EPISODE SEVERITY (H): ICD-10-CM

## 2023-07-07 DIAGNOSIS — G40.909 SEIZURE DISORDER (H): ICD-10-CM

## 2023-07-07 DIAGNOSIS — M79.2 NEUROPATHIC PAIN: ICD-10-CM

## 2023-07-07 DIAGNOSIS — E11.69 TYPE 2 DIABETES MELLITUS WITH OTHER SPECIFIED COMPLICATION, WITHOUT LONG-TERM CURRENT USE OF INSULIN (H): ICD-10-CM

## 2023-07-07 DIAGNOSIS — J44.1 CHRONIC OBSTRUCTIVE PULMONARY DISEASE WITH ACUTE EXACERBATION (H): ICD-10-CM

## 2023-07-07 DIAGNOSIS — I63.9 CEREBROVASCULAR ACCIDENT (CVA), UNSPECIFIED MECHANISM (H): Primary | ICD-10-CM

## 2023-07-07 PROCEDURE — 99309 SBSQ NF CARE MODERATE MDM 30: CPT | Performed by: FAMILY MEDICINE

## 2023-07-07 RX ORDER — OXYCODONE HYDROCHLORIDE 5 MG/1
5 TABLET ORAL
Qty: 30 TABLET | Refills: 0 | Status: SHIPPED | OUTPATIENT
Start: 2023-07-07 | End: 2023-08-07

## 2023-07-07 NOTE — PROGRESS NOTES
[facility] GERIATRICS  No chief complaint on file.    [place] Medical Record Number:  [id number]  Place of Service where encounter took place:  [facility] ([facility]) [[id number]]    HPI:    Anna Mathew  is 68 year old (1955), who is being seen today for a federally mandated E/M visit. Today's concerns are:  This is 60-day regulatory visit.  There have been no changes in patient's condition.  Continues to struggle with her chronic neuropathic pain.    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     Hemiplegia, unspecified, affecting unspecified side      Hyperglycemia 9/30/10     Mechanical complication of gastrostomy (H)      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     Other pulmonary insufficiency, not elsewhere classified 12/6/10    Hospitalized     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  FAMILY HISTORY: family history is not on file.  SOCIAL HISTORY:  reports that  she quit smoking about 12 years ago. Her smoking use included cigarettes. She smoked an average of 1 pack per day. She has been exposed to tobacco smoke. She has never used smokeless tobacco.    MEDICATIONS:  MED REC REQUIRED  Post Medication Reconciliation Status:          Review of your medicines          Accurate as of July 7, 2023  7:50 AM. If you have any questions, ask your nurse or doctor.            CONTINUE these medicines which may have CHANGED, or have new prescriptions. If we are uncertain of the size of tablets/capsules you have at home, strength may be listed as something that might have changed.      Dose / Directions   gabapentin 300 MG capsule  Commonly known as: NEURONTIN  This may have changed: how much to take  Used for: Seizure disorder (H), Hemiparesis, left (H), Cognitive impairment      Dose: 300 mg  Take 1 capsule by mouth 3 times daily.  Quantity: 270 capsule  Refills: 3        CONTINUE these medicines which have NOT CHANGED      Dose / Directions   acetaminophen 325 MG tablet  Commonly known as: TYLENOL  Used for: Seizure disorder (H), Hemiparesis, left (H), Cognitive impairment      Dose: 2 tablet  Take 2 tablets by mouth 2 times daily  Refills: 0     albuterol 108 (90 Base) MCG/ACT inhaler  Commonly known as: PROAIR HFA/PROVENTIL HFA/VENTOLIN HFA      Refills: 0     Arnuity Ellipta 100 MCG/ACT inhaler  Generic drug: fluticasone      Refills: 0     aspirin 81 MG EC tablet      Dose: 81 mg  Take 81 mg by mouth daily  Refills: 0     bisacodyl 10 MG suppository  Commonly known as: DULCOLAX      Dose: 1 suppository  Place 1 suppository rectally daily.  Refills: 0     buPROPion 75 MG tablet  Commonly known as: WELLBUTRIN      Dose: 150 mg  Take 150 mg by mouth daily  Refills: 0     carisoprodol 250 MG tablet  Commonly known as: SOMA  Used for: Neuropathic pain      Dose: 250 mg  Take 1 tablet (250 mg) by mouth 2 times daily as needed (pain)  Quantity: 60 tablet  Refills: 5     DULoxetine 60 MG  capsule  Commonly known as: CYMBALTA      Dose: 60 mg  Take 60 mg by mouth daily  Refills: 0     famotidine 20 MG tablet  Commonly known as: PEPCID      Dose: 20 mg  Take 20 mg by mouth 2 times daily.  Refills: 0     ibuprofen 200 MG tablet  Commonly known as: ADVIL/MOTRIN      Dose: 200 mg  Take 200 mg by mouth every 6 hours as needed for pain  Refills: 0     lidocaine 5 % patch  Commonly known as: LIDODERM  Used for: Neuropathic pain      Dose: 1 patch  Place 1 patch onto the skin every 24 hours To prevent lidocaine toxicity, patient should be patch free for 12 hrs daily. On for 12 hours, off for 12 hours.  Quantity: 30 patch  Refills: 11     magnesium hydroxide 2400 MG/10ML Susp  Commonly known as: MOM      Dose: 30 mL  Take 30 mLs by mouth daily as needed for constipation  Refills: 0     naloxone 4 MG/0.1ML nasal spray  Commonly known as: NARCAN  Used for: Neuropathic pain      Dose: 4 mg  Spray 1 spray (4 mg) into one nostril alternating nostrils once as needed for opioid reversal every 2-3 minutes until assistance arrives  Quantity: 0.2 mL  Refills: 1     omeprazole 20 MG DR capsule  Commonly known as: priLOSEC      Dose: 20 mg  Take 20 mg by mouth daily  Refills: 0     oxyCODONE 5 MG tablet  Commonly known as: ROXICODONE  Used for: Neuropathic pain      Dose: 5 mg  Take 1 tablet (5 mg) by mouth nightly as needed for severe pain  Quantity: 30 tablet  Refills: 0     polyethylene glycol 17 g packet  Commonly known as: MIRALAX      Dose: 1 packet  Take 1 packet by mouth daily  Refills: 0     PROSOURCE PLUS PO      Dose: 30 mL  Take 30 mLs by mouth daily.  Refills: 0     umeclidinium 62.5 MCG/ACT inhaler  Commonly known as: INCRUSE ELLIPTA      Dose: 1 puff  Inhale 1 puff into the lungs daily  Refills: 0             Case Management:  I have reviewed the care plan and MDS and do agree with the plan. Patient's desire to return to the community is not present. Information reviewed:  Medications, vital signs, orders,  and nursing notes.    ROS:  4 point ROS including Respiratory, CV, GI and , other than that noted in the HPI,  is negative    Vitals:  LMP  (LMP Unknown)   There is no height or weight on file to calculate BMI.  Exam:  GENERAL APPEARANCE:  Alert, in no distress  NEURO:   Left-sided hemiparesis    Lab/Diagnostic data:     ASSESSMENT/PLAN  (I63.9) Cerebrovascular accident (CVA), unspecified mechanism (H)  (primary encounter diagnosis)  Comment: Unchanged  Plan:     (M79.2) Neuropathic pain  Comment: Under reasonable control  Plan:     (G81.94) Hemiparesis, left (H)  Comment:   Plan:     (G40.909) Seizure disorder (H)  Comment: No seizures  Plan:     (J44.1) Chronic obstructive pulmonary disease with acute exacerbation (H)  Comment: No current issues with symptoms continue with inhaler  Plan:     (E11.69) Type 2 diabetes mellitus with other specified complication, without long-term current use of insulin (H)  Comment: A1c controlled  Plan:     (F33.9) Episode of recurrent major depressive disorder, unspecified depression episode severity (H)  Comment:   Plan: Controlled with duloxetine        Stanford Gayle MD on 7/7/2023 at 12:19 PM

## 2023-07-21 ENCOUNTER — NURSING HOME VISIT (OUTPATIENT)
Dept: GERIATRICS | Facility: CLINIC | Age: 68
End: 2023-07-21
Payer: MEDICARE

## 2023-07-21 DIAGNOSIS — G44.89 OTHER HEADACHE SYNDROME: Primary | ICD-10-CM

## 2023-07-21 DIAGNOSIS — G81.94 HEMIPARESIS, LEFT (H): ICD-10-CM

## 2023-07-21 DIAGNOSIS — R41.89 COGNITIVE IMPAIRMENT: ICD-10-CM

## 2023-07-21 DIAGNOSIS — G40.909 SEIZURE DISORDER (H): ICD-10-CM

## 2023-07-21 PROCEDURE — 99308 SBSQ NF CARE LOW MDM 20: CPT | Performed by: FAMILY MEDICINE

## 2023-07-21 RX ORDER — GABAPENTIN 300 MG/1
900 CAPSULE ORAL 3 TIMES DAILY
COMMUNITY
Start: 2023-07-21 | End: 2024-09-23

## 2023-07-21 RX ORDER — GABAPENTIN 300 MG/1
900 CAPSULE ORAL 3 TIMES DAILY
Status: CANCELLED | COMMUNITY
Start: 2023-07-21

## 2023-07-21 NOTE — PROGRESS NOTES
[facility] GERIATRICS    No chief complaint on file.    HPI:  Anna Mathew is a 68 year old  (1955), who is being seen today for an episodic care visit at: [facility] ([facility]) [[id number]]. Today's concern is: Asked to see patient for headaches she notes that this morning she felt a lot of pressure in her head in front she felt like things were moving around in front of her and she felt a buzzing in her left ear.  It is better than it was but still there.  No nausea vomiting.    Allergies, and PMH/PSH reviewed in EPIC today.  REVIEW OF SYSTEMS:  4 point ROS including Respiratory, CV, GI and , other than that noted in the HPI,  is negative    Objective:   LMP  (LMP Unknown)   GENERAL APPEARANCE:  Alert, in no distress  ENT:  Mouth and posterior oropharynx normal, moist mucous membranes  EYES:  EOM, conjunctivae, lids, pupils and irises normal  NEURO:   Right-sided hemiparesis        Assessment/Plan:  (G44.89) Other headache syndrome  (primary encounter diagnosis)  Comment: Vague symptoms certainly had some vertiginous component to this morning but not now.  We will go ahead and start her on eyedrops as per her last eye check.  Follow-up with symptoms do not clear  Plan:     (G40.909) Seizure disorder (H)  Comment: No recent seizures  Plan:     (G81.94) Hemiparesis, left (H)  Comment: Unchanged  Plan:     (R41.89) Cognitive impairment  Comment: Stable  Plan:         MED REC REQUIRED  Post Medication Reconciliation Status:     Current Outpatient Medications   Medication     gabapentin (NEURONTIN) 300 MG capsule     acetaminophen (TYLENOL) 325 MG tablet     albuterol (PROAIR HFA/PROVENTIL HFA/VENTOLIN HFA) 108 (90 Base) MCG/ACT inhaler     ARNUITY ELLIPTA 100 MCG/ACT inhaler     aspirin 81 MG EC tablet     bisacodyl (DULCOLAX) 10 MG suppository     buPROPion (WELLBUTRIN) 75 MG tablet     carisoprodol (SOMA) 250 MG tablet     DULoxetine (CYMBALTA) 60 MG capsule     famotidine (PEPCID) 20 MG tablet      ibuprofen (ADVIL/MOTRIN) 200 MG tablet     lidocaine (LIDODERM) 5 % patch     magnesium hydroxide (MOM) 2400 MG/10ML SUSP     naloxone (NARCAN) 4 MG/0.1ML nasal spray     Nutritional Supplements (PROSOURCE PLUS PO)     omeprazole (PRILOSEC) 20 MG DR capsule     oxyCODONE (ROXICODONE) 5 MG tablet     polyethylene glycol (MIRALAX) 17 g packet     umeclidinium (INCRUSE ELLIPTA) 62.5 MCG/ACT inhaler     No current facility-administered medications for this visit.       Stanford Gayle MD on 7/21/2023 at 8:52 AM

## 2023-08-07 DIAGNOSIS — M79.2 NEUROPATHIC PAIN: ICD-10-CM

## 2023-08-07 NOTE — TELEPHONE ENCOUNTER
Fax request from [facility] for new script needed on Oxy 5mg to send to [facility].

## 2023-08-08 RX ORDER — OXYCODONE HYDROCHLORIDE 5 MG/1
5 TABLET ORAL
Qty: 30 TABLET | Refills: 0 | Status: SHIPPED | OUTPATIENT
Start: 2023-08-08 | End: 2023-09-06

## 2023-08-24 DIAGNOSIS — M79.2 NEUROPATHIC PAIN: ICD-10-CM

## 2023-08-24 RX ORDER — CARISOPRODOL 250 MG/1
250 TABLET ORAL 2 TIMES DAILY PRN
Qty: 60 TABLET | Refills: 5 | Status: SHIPPED | OUTPATIENT
Start: 2023-08-24 | End: 2023-10-06

## 2023-09-06 DIAGNOSIS — M79.2 NEUROPATHIC PAIN: ICD-10-CM

## 2023-09-06 RX ORDER — OXYCODONE HYDROCHLORIDE 5 MG/1
5 TABLET ORAL
Qty: 30 TABLET | Refills: 0 | Status: SHIPPED | OUTPATIENT
Start: 2023-09-06 | End: 2023-10-06

## 2023-09-29 ENCOUNTER — NURSING HOME VISIT (OUTPATIENT)
Dept: GERIATRICS | Facility: CLINIC | Age: 68
End: 2023-09-29
Payer: MEDICARE

## 2023-09-29 DIAGNOSIS — G40.909 SEIZURE DISORDER (H): ICD-10-CM

## 2023-09-29 DIAGNOSIS — I63.9 CEREBROVASCULAR ACCIDENT (CVA), UNSPECIFIED MECHANISM (H): Primary | ICD-10-CM

## 2023-09-29 DIAGNOSIS — E11.69 TYPE 2 DIABETES MELLITUS WITH OTHER SPECIFIED COMPLICATION, WITHOUT LONG-TERM CURRENT USE OF INSULIN (H): ICD-10-CM

## 2023-09-29 DIAGNOSIS — H53.462 LEFT HOMONYMOUS HEMIANOPSIA: ICD-10-CM

## 2023-09-29 DIAGNOSIS — F33.9 EPISODE OF RECURRENT MAJOR DEPRESSIVE DISORDER, UNSPECIFIED DEPRESSION EPISODE SEVERITY (H): ICD-10-CM

## 2023-09-29 DIAGNOSIS — G81.94 HEMIPARESIS, LEFT (H): ICD-10-CM

## 2023-09-29 DIAGNOSIS — J44.1 CHRONIC OBSTRUCTIVE PULMONARY DISEASE WITH ACUTE EXACERBATION (H): ICD-10-CM

## 2023-09-29 DIAGNOSIS — R41.89 COGNITIVE IMPAIRMENT: ICD-10-CM

## 2023-09-29 PROCEDURE — 99309 SBSQ NF CARE MODERATE MDM 30: CPT | Performed by: FAMILY MEDICINE

## 2023-09-29 NOTE — PROGRESS NOTES
[facility] GERIATRICS  No chief complaint on file.    [place] Medical Record Number:  [id number]  Place of Service where encounter took place:  [facility] ([facility]) [[id number]]    HPI:    Anna Mathew  is 68 year old (1955), who is being seen today for a federally mandated E/M visit. Today's concerns are:  This is 60-day regulatory visit.  There have been no changes in patient's condition or cares.    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    Hemiplegia, unspecified, affecting unspecified side     Hyperglycemia 9/30/10    Mechanical complication of gastrostomy (H)     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    Other pulmonary insufficiency, not elsewhere classified 12/6/10    Hospitalized    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  FAMILY HISTORY: family history is not on file.  SOCIAL HISTORY:  reports that she quit smoking about 12 years ago. Her smoking use included  cigarettes. She smoked an average of 1 pack per day. She has been exposed to tobacco smoke. She has never used smokeless tobacco.    MEDICATIONS:  MED REC REQUIRED  Post Medication Reconciliation Status:          Review of your medicines            Accurate as of September 29, 2023  7:07 AM. If you have any questions, ask your nurse or doctor.                CONTINUE these medicines which have NOT CHANGED        Dose / Directions   acetaminophen 325 MG tablet  Commonly known as: TYLENOL  Used for: Seizure disorder (H), Hemiparesis, left (H), Cognitive impairment      Dose: 2 tablet  Take 2 tablets by mouth 2 times daily  Refills: 0     albuterol 108 (90 Base) MCG/ACT inhaler  Commonly known as: PROAIR HFA/PROVENTIL HFA/VENTOLIN HFA      Refills: 0     Arnuity Ellipta 100 MCG/ACT inhaler  Generic drug: fluticasone      Refills: 0     aspirin 81 MG EC tablet      Dose: 81 mg  Take 81 mg by mouth daily  Refills: 0     bisacodyl 10 MG suppository  Commonly known as: DULCOLAX      Dose: 1 suppository  Place 1 suppository rectally daily.  Refills: 0     buPROPion 75 MG tablet  Commonly known as: WELLBUTRIN      Dose: 150 mg  Take 150 mg by mouth daily  Refills: 0     carisoprodol 250 MG tablet  Commonly known as: SOMA  Used for: Neuropathic pain      Dose: 250 mg  Take 1 tablet (250 mg) by mouth 2 times daily as needed (pain)  Quantity: 60 tablet  Refills: 5     DULoxetine 60 MG capsule  Commonly known as: CYMBALTA      Dose: 60 mg  Take 60 mg by mouth daily  Refills: 0     famotidine 20 MG tablet  Commonly known as: PEPCID      Dose: 20 mg  Take 20 mg by mouth 2 times daily.  Refills: 0     gabapentin 300 MG capsule  Commonly known as: NEURONTIN  Used for: Seizure disorder (H), Hemiparesis, left (H), Cognitive impairment      Dose: 900 mg  Take 3 capsules (900 mg) by mouth 3 times daily  Refills: 0     ibuprofen 200 MG tablet  Commonly known as: ADVIL/MOTRIN      Dose: 200 mg  Take 200 mg by mouth every 6 hours as needed  for pain  Refills: 0     lidocaine 5 % patch  Commonly known as: LIDODERM  Used for: Neuropathic pain      Dose: 1 patch  Place 1 patch onto the skin every 24 hours To prevent lidocaine toxicity, patient should be patch free for 12 hrs daily. On for 12 hours, off for 12 hours.  Quantity: 30 patch  Refills: 11     magnesium hydroxide 2400 MG/10ML Susp  Commonly known as: MOM      Dose: 30 mL  Take 30 mLs by mouth daily as needed for constipation  Refills: 0     naloxone 4 MG/0.1ML nasal spray  Commonly known as: NARCAN  Used for: Neuropathic pain      Dose: 4 mg  Spray 1 spray (4 mg) into one nostril alternating nostrils once as needed for opioid reversal every 2-3 minutes until assistance arrives  Quantity: 0.2 mL  Refills: 1     omeprazole 20 MG DR capsule  Commonly known as: PriLOSEC      Dose: 20 mg  Take 20 mg by mouth daily  Refills: 0     oxyCODONE 5 MG tablet  Commonly known as: ROXICODONE  Used for: Neuropathic pain      Dose: 5 mg  Take 1 tablet (5 mg) by mouth nightly as needed for severe pain  Quantity: 30 tablet  Refills: 0     polyethylene glycol 17 g packet  Commonly known as: MIRALAX      Dose: 1 packet  Take 1 packet by mouth daily  Refills: 0     PROSOURCE PLUS PO      Dose: 30 mL  Take 30 mLs by mouth daily.  Refills: 0     umeclidinium 62.5 MCG/ACT inhaler  Commonly known as: INCRUSE ELLIPTA      Dose: 1 puff  Inhale 1 puff into the lungs daily  Refills: 0               Case Management:  I have reviewed the care plan and MDS and do agree with the plan. Patient's desire to return to the community is present, but is not able due to care needs . Information reviewed:  Medications, vital signs, orders, and nursing notes.    ROS:  4 point ROS including Respiratory, CV, GI and , other than that noted in the HPI,  is negative    Vitals:  LMP  (LMP Unknown)   There is no height or weight on file to calculate BMI.  Exam:  GENERAL APPEARANCE:  Alert, in no distress  NEURO:   Left-sided  hemiparesis        ASSESSMENT/PLAN  (I63.9) Cerebrovascular accident (CVA), unspecified mechanism (H)  (primary encounter diagnosis)  Comment: Unchanged  Plan:     (G81.94) Hemiparesis, left (H)  Comment: Unchanged  Plan:     (H53.462) Left homonymous hemianopsia  Comment: Unchanged  Plan:     (G40.909) Seizure disorder (H)  Comment: No recent seizures  Plan:     (R41.89) Cognitive impairment  Comment: Stable  Plan:     (E11.69) Type 2 diabetes mellitus with other specified complication, without long-term current use of insulin (H)  Comment: A1c twice a year  Plan:     (F33.9) Episode of recurrent major depressive disorder, unspecified depression episode severity (H)  Comment: On Cymbalta and bupropion  Plan:     (J44.1) Chronic obstructive pulmonary disease with acute exacerbation (H)  Comment: Controlled on inhalers  Plan:         Stanford Gayle MD on 9/29/2023 at 8:54 AM

## 2023-10-05 DIAGNOSIS — M79.2 NEUROPATHIC PAIN: ICD-10-CM

## 2023-10-05 NOTE — TELEPHONE ENCOUNTER
Medication Question or Refill    What medication are you calling about (include dose and sig)?: Carisoprodol 250 MG tablet      Pharmacy-  [address] [address]  Phone: [phone] Fax: [phone]      Controlled Substance Agreement on file:   CSA -- Patient Level:    CSA: None found at the patient level.       Who prescribed the medication?: Dr. Gayle    Do you need a refill? Yes    Do you have any questions or concerns?  No      Okay to leave a detailed message?: Yes at Cell number on file:    Telephone Information:   Mobile [phone]

## 2023-10-06 DIAGNOSIS — M79.2 NEUROPATHIC PAIN: ICD-10-CM

## 2023-10-06 RX ORDER — CARISOPRODOL 250 MG/1
250 TABLET ORAL 2 TIMES DAILY PRN
Qty: 60 TABLET | Refills: 5 | Status: SHIPPED | OUTPATIENT
Start: 2023-10-06 | End: 2023-11-09

## 2023-10-06 RX ORDER — OXYCODONE HYDROCHLORIDE 5 MG/1
5 TABLET ORAL
Qty: 30 TABLET | Refills: 0 | Status: SHIPPED | OUTPATIENT
Start: 2023-10-06 | End: 2023-10-27

## 2023-10-06 RX ORDER — CARISOPRODOL 250 MG/1
250 TABLET ORAL 2 TIMES DAILY PRN
Qty: 60 TABLET | Refills: 5 | OUTPATIENT
Start: 2023-10-06

## 2023-10-27 DIAGNOSIS — M79.2 NEUROPATHIC PAIN: ICD-10-CM

## 2023-10-27 RX ORDER — OXYCODONE HYDROCHLORIDE 5 MG/1
5 TABLET ORAL
Qty: 30 TABLET | Refills: 0 | Status: SHIPPED | OUTPATIENT
Start: 2023-10-30 | End: 2023-11-30

## 2023-11-09 ENCOUNTER — NURSING HOME VISIT (OUTPATIENT)
Dept: GERIATRICS | Facility: CLINIC | Age: 68
End: 2023-11-09
Payer: MEDICARE

## 2023-11-09 DIAGNOSIS — M79.2 NEUROPATHIC PAIN: Primary | ICD-10-CM

## 2023-11-09 PROCEDURE — 99308 SBSQ NF CARE LOW MDM 20: CPT | Performed by: FAMILY MEDICINE

## 2023-11-09 RX ORDER — CARISOPRODOL 250 MG/1
250 TABLET ORAL 3 TIMES DAILY PRN
Qty: 90 TABLET | Refills: 5 | Status: SHIPPED | OUTPATIENT
Start: 2023-11-09 | End: 2024-03-07

## 2023-11-09 NOTE — PROGRESS NOTES
[facility] GERIATRICS    No chief complaint on file.    HPI:  Anna Mathew is a 68 year old  (1955), who is being seen today for an episodic care visit at: [facility] ([facility]) [[id number]]. Today's concern is: General pain is worse    Allergies, and PMH/PSH reviewed in EPIC today.  REVIEW OF SYSTEMS:  4 point ROS including Respiratory, CV, GI and , other than that noted in the HPI,  is negative    Objective:   LMP  (LMP Unknown)   GENERAL APPEARANCE:  Alert, in no distress        Assessment/Plan:  (M79.2) Neuropathic pain  (primary encounter diagnosis)  Comment: will increase soma  Plan:         MED REC REQUIRED  Post Medication Reconciliation Status:       Stanford Gayle MD on 11/9/2023 at 11:23 AM

## 2023-11-30 DIAGNOSIS — M79.2 NEUROPATHIC PAIN: ICD-10-CM

## 2023-11-30 RX ORDER — OXYCODONE HYDROCHLORIDE 5 MG/1
5 TABLET ORAL
Qty: 30 TABLET | Refills: 0 | Status: SHIPPED | OUTPATIENT
Start: 2023-11-30 | End: 2023-12-28

## 2023-12-28 DIAGNOSIS — M79.2 NEUROPATHIC PAIN: ICD-10-CM

## 2023-12-28 RX ORDER — OXYCODONE HYDROCHLORIDE 5 MG/1
5 TABLET ORAL
Qty: 30 TABLET | Refills: 0 | Status: SHIPPED | OUTPATIENT
Start: 2023-12-28 | End: 2024-01-25

## 2024-01-04 ENCOUNTER — NURSING HOME VISIT (OUTPATIENT)
Dept: GERIATRICS | Facility: CLINIC | Age: 69
End: 2024-01-04
Payer: MEDICARE

## 2024-01-04 DIAGNOSIS — R20.2 PARESTHESIA: Primary | ICD-10-CM

## 2024-01-04 PROCEDURE — 99309 SBSQ NF CARE MODERATE MDM 30: CPT | Performed by: FAMILY MEDICINE

## 2024-01-04 NOTE — PROGRESS NOTES
[facility] GERIATRICS    Chief complaint paresthesias    HPI:  Anna Mathew is a 68 year old  (1955), who is being seen today for an episodic care visit at: [facility] ([facility]) [[id number]]. Today's concern is: Resident complains of paresthesias described as needles poking her skin.  She reports is more prominent in the lower extremities.  She has had issues with similar complaints in the past.  She is on gabapentin and oxycodone.  She also is on duloxetine.  Staff reports she is usually quite sedated.    Allergies, and PMH/PSH reviewed in EPIC today.  REVIEW OF SYSTEMS:  Unobtainable secondary to cognitive impairment.     Objective:   LMP  (LMP Unknown)   GENERAL APPEARANCE:  somnolent  RESP:  no respiratory distress  CV:  rate-normal  PSYCH:  insight and judgement impaired        Assessment/Plan:  (R20.2) Paresthesia  (primary encounter diagnosis)  Comment: Patient reports new paresthesias.  Plan: I am hesitant to increase oxycodone or gabapentin due to sedation.  Increase bupropion to 225 mg daily.  I suspect she may be experiencing increased depression.  Follow-up in the next 2 weeks.      MED REC REQUIRED  Post Medication Reconciliation Status:           Electronically signed by: Herson Marshall MD

## 2024-01-25 DIAGNOSIS — M79.2 NEUROPATHIC PAIN: ICD-10-CM

## 2024-01-25 RX ORDER — OXYCODONE HYDROCHLORIDE 5 MG/1
5 TABLET ORAL
Qty: 30 TABLET | Refills: 0 | Status: SHIPPED | OUTPATIENT
Start: 2024-01-25 | End: 2024-02-26

## 2024-02-09 ENCOUNTER — OFFICE VISIT (OUTPATIENT)
Dept: FAMILY MEDICINE | Facility: CLINIC | Age: 69
End: 2024-02-09
Payer: MEDICARE

## 2024-02-09 VITALS
RESPIRATION RATE: 12 BRPM | TEMPERATURE: 96.4 F | HEIGHT: 64 IN | OXYGEN SATURATION: 93 % | WEIGHT: 162 LBS | HEART RATE: 93 BPM | BODY MASS INDEX: 27.66 KG/M2 | SYSTOLIC BLOOD PRESSURE: 126 MMHG | DIASTOLIC BLOOD PRESSURE: 84 MMHG

## 2024-02-09 DIAGNOSIS — G81.94 HEMIPARESIS, LEFT (H): ICD-10-CM

## 2024-02-09 DIAGNOSIS — F11.20 CONTINUOUS OPIOID DEPENDENCE (H): ICD-10-CM

## 2024-02-09 DIAGNOSIS — J44.9 CHRONIC OBSTRUCTIVE PULMONARY DISEASE, UNSPECIFIED COPD TYPE (H): Primary | ICD-10-CM

## 2024-02-09 DIAGNOSIS — G40.909 SEIZURE DISORDER (H): ICD-10-CM

## 2024-02-09 DIAGNOSIS — I63.9 CEREBROVASCULAR ACCIDENT (CVA), UNSPECIFIED MECHANISM (H): ICD-10-CM

## 2024-02-09 DIAGNOSIS — J96.90 RESPIRATORY FAILURE, UNSPECIFIED CHRONICITY, UNSPECIFIED WHETHER WITH HYPOXIA OR HYPERCAPNIA (H): ICD-10-CM

## 2024-02-09 DIAGNOSIS — F33.9 EPISODE OF RECURRENT MAJOR DEPRESSIVE DISORDER, UNSPECIFIED DEPRESSION EPISODE SEVERITY (H): ICD-10-CM

## 2024-02-09 DIAGNOSIS — E11.69 TYPE 2 DIABETES MELLITUS WITH OTHER SPECIFIED COMPLICATION, WITHOUT LONG-TERM CURRENT USE OF INSULIN (H): ICD-10-CM

## 2024-02-09 PROCEDURE — 99214 OFFICE O/P EST MOD 30 MIN: CPT | Performed by: INTERNAL MEDICINE

## 2024-02-09 RX ORDER — KETOROLAC TROMETHAMINE 5 MG/ML
1 SOLUTION OPHTHALMIC 4 TIMES DAILY
COMMUNITY

## 2024-02-09 RX ORDER — MOXIFLOXACIN 5 MG/ML
1 SOLUTION/ DROPS OPHTHALMIC
COMMUNITY

## 2024-02-09 RX ORDER — POLYETHYLENE GLYCOL AND PROPYLENE GLYCOL 4; 3 MG/ML; MG/ML
2 SOLUTION/ DROPS OPHTHALMIC
COMMUNITY

## 2024-02-09 RX ORDER — PREDNISOLONE ACETATE 10 MG/ML
1 SUSPENSION/ DROPS OPHTHALMIC
COMMUNITY

## 2024-02-09 ASSESSMENT — PATIENT HEALTH QUESTIONNAIRE - PHQ9
SUM OF ALL RESPONSES TO PHQ QUESTIONS 1-9: 5
10. IF YOU CHECKED OFF ANY PROBLEMS, HOW DIFFICULT HAVE THESE PROBLEMS MADE IT FOR YOU TO DO YOUR WORK, TAKE CARE OF THINGS AT HOME, OR GET ALONG WITH OTHER PEOPLE: VERY DIFFICULT
SUM OF ALL RESPONSES TO PHQ QUESTIONS 1-9: 5

## 2024-02-09 NOTE — PROGRESS NOTES
Preoperative Evaluation  [facility]  [address] [address]  Phone: [phone]  Fax: [phone]  Primary Provider: Stanford Gayle  Pre-op Performing Provider: JUANA VILLANUEVA  Feb 9, 2024       Anna is a 68 year old, presenting for the following: Presents for preoperative evaluation before planned bilateral cataract removal scheduled for later this month.  She is a long-term resident at [facility], related to aftereffects of a stroke.  Fairly stable recent health issues.  Does express improvement in vision and is being an important future health goal.  Pre-Op Exam ([facility], Rt eye cataract surgery, DOS 02/14/2024)        2/9/2024     3:16 PM   Additional Questions   Roomed by KANIKA Stone     Surgical Information  Surgery/Procedure: Rt eye cataract surgery  Surgery Location: [facility]  Surgeon: Dr. Philip  Surgery Date: 02/14/2024  Time of Surgery: NA  Where patient plans to recover: At a nursing home  Fax number for surgical facility:     Assessment & Plan     The proposed surgical procedure is considered LOW risk.    Problem List Items Addressed This Visit          Nervous and Auditory    Seizure disorder (H)    Hemiparesis, left (H)    Cerebrovascular accident (CVA) (H)     Prior stroke history with longstanding left-sided hemiparesis as sequela            Respiratory    Respiratory failure (H)    COPD (chronic obstructive pulmonary disease) (H) - Primary     Maintained on Incruse and Arnuity daily  No baseline need for supplemental oxygen            Endocrine    Type 2 diabetes mellitus with other specified complication, without long-term current use of insulin (H)       Behavioral    Episode of recurrent major depressive disorder, unspecified depression episode severity (H24)       Other    Continuous opioid dependence (H)              Risks and Recommendations  The patient has the following additional risks and recommendations for perioperative  complications:  Pulmonary:    - Incentive spirometry post-op    Hemiparesis, functional limitations   -Not able to stand independently.  Requires use of [facility]  her nursing home facility.  Appropriate arrangements will be needed for day of procedure    Antiplatelet or Anticoagulation Medication Instructions   - Bleeding risk is low for this procedure (e.g. dental, skin, cataract).    Additional Medication Instructions  Patient is to take all scheduled medications on the day of surgery    Recommendation  APPROVAL GIVEN to proceed with proposed procedure, without further diagnostic evaluation.        Subjective       HPI related to upcoming procedure:         2/9/2024     3:05 PM   Preop Questions   1. Have you ever had a heart attack or stroke? YES - CVA   2. Have you ever had surgery on your heart or blood vessels, such as a stent placement, a coronary artery bypass, or surgery on an artery in your head, neck, heart, or legs? No   3. Do you have chest pain with activity? No   4. Do you have a history of  heart failure? No   5. Do you currently have a cold, bronchitis or symptoms of other infection? No   6. Do you have a cough, shortness of breath, or wheezing? No   7. Do you or anyone in your family have previous history of blood clots? No   8. Do you or does anyone in your family have a serious bleeding problem such as prolonged bleeding following surgeries or cuts? No   9. Have you ever had problems with anemia or been told to take iron pills? No   10. Have you had any abnormal blood loss such as black, tarry or bloody stools, or abnormal vaginal bleeding? No   11. Have you ever had a blood transfusion? No   12. Are you willing to have a blood transfusion if it is medically needed before, during, or after your surgery? NO -    13. Have you or any of your relatives ever had problems with anesthesia? No   14. Do you have sleep apnea, excessive snoring or daytime drowsiness? No   15. Do you have any artifical  heart valves or other implanted medical devices like a pacemaker, defibrillator, or continuous glucose monitor? No   16. Do you have artificial joints? No   17. Are you allergic to latex? No       Health Care Directive  Patient does not have a Health Care Directive or Living Will:   Confirms DNR status              Patient Active Problem List    Diagnosis Date Noted    Episode of recurrent major depressive disorder, unspecified depression episode severity (H24) 02/06/2023     Priority: Medium    Anxiety 03/01/2022     Priority: Medium    Neuropathic pain 03/01/2022     Priority: Medium    Irritation of left eye 03/01/2022     Priority: Medium    Impaired mobility 11/28/2018     Priority: Medium    Cerebrovascular accident (CVA) (H) 04/28/2015     Priority: Medium    Seizure disorder (H) 05/30/2011     Priority: Medium     status epilepicus hospitalized at [place] [facility]   Formatting of this note might be different from the original.  Overview:   status epilepicus hospitalized at [place]      COPD (chronic obstructive pulmonary disease) (H) 01/11/2011     Priority: Medium    Cognitive impairment      Priority: Medium    Type 2 diabetes mellitus with other specified complication, without long-term current use of insulin (H)      Priority: Medium     Problem list name updated by automated process. 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    Hemiplegia, unspecified, affecting unspecified side     Hyperglycemia 9/30/10    Mechanical complication of gastrostomy (H)     Mild recurrent major depression (H24) 5/29-6/2/11    Other pulmonary insufficiency, not elsewhere classified 12/6/10    Hospitalized    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    Respiratory failure (H) 9/30/2010    Seizure disorder (H) 9/30 - 11/12/2010    status epilepicus hospitalized at [facility]     Septic shock(785.52) 5/29-6/2/11    Hospitalized @ [facility], urinary source    Thrombocytopenia (H24) 5/30/11    likely due to sepsis    Type II or unspecified type diabetes mellitus without mention of complication, not stated as uncontrolled     Unspecified cause of encephalitis, myelitis, and encephalomyelitis     Vitamin D deficiency      Past Surgical History:   Procedure Laterality Date    Gastric/jejunostomy tube placement  11/1/2010    T tacks used, 18 french YOUSIF     Current Outpatient Medications   Medication Sig Dispense Refill    acetaminophen (TYLENOL) 325 MG tablet Take 2 tablets by mouth 2 times daily      albuterol (PROAIR HFA/PROVENTIL HFA/VENTOLIN HFA) 108 (90 Base) MCG/ACT inhaler       ARNUITY ELLIPTA 100 MCG/ACT inhaler       aspirin 81 MG EC tablet Take 81 mg by mouth daily      bisacodyl (DULCOLAX) 10 MG suppository Place 1 suppository rectally daily.      buPROPion (WELLBUTRIN) 75 MG tablet Take 150 mg by mouth daily      carisoprodol (SOMA) 250 MG tablet Take 1 tablet (250 mg) by mouth 3 times daily as needed (pain) 90 tablet 5    DULoxetine (CYMBALTA) 60 MG capsule Take 60 mg by mouth daily      famotidine (PEPCID) 20 MG tablet Take 20 mg by mouth 2 times daily.      gabapentin (NEURONTIN) 300 MG capsule Take 3 capsules (900 mg) by mouth 3 times  daily      ibuprofen (ADVIL/MOTRIN) 200 MG tablet Take 200 mg by mouth every 6 hours as needed for pain      ketorolac (ACULAR) 0.5 % ophthalmic solution Apply 1 drop to eye 4 times daily      lidocaine (LIDODERM) 5 % patch Place 1 patch onto the skin every 24 hours To prevent lidocaine toxicity, patient should be patch free for 12 hrs daily. On for 12 hours, off for 12 hours. 30 patch 11    magnesium hydroxide (MOM) 2400 MG/10ML SUSP Take 30 mLs by mouth daily as needed for constipation      moxifloxacin (VIGAMOX) 0.5 % ophthalmic solution Apply 1 drop to eye      naloxone (NARCAN) 4 MG/0.1ML nasal spray Spray 1 spray (4 mg) into one nostril alternating nostrils once as needed for opioid reversal every 2-3 minutes until assistance arrives 0.2 mL 1    Nutritional Supplements (PROSOURCE PLUS PO) Take 30 mLs by mouth daily.      omeprazole (PRILOSEC) 20 MG DR capsule Take 20 mg by mouth daily      oxyCODONE (ROXICODONE) 5 MG tablet Take 1 tablet (5 mg) by mouth nightly as needed for severe pain 30 tablet 0    polyethylene glycol (MIRALAX) 17 g packet Take 1 packet by mouth daily      polyethylene glycol-propylene glycol PF (SYSTANE PRESERVATIVE FREE) 0.4-0.3 % SOLN opthalmic solution Apply 2 drops to eye      prednisoLONE acetate (PRED FORTE) 1 % ophthalmic suspension Apply 1 drop to eye      umeclidinium (INCRUSE ELLIPTA) 62.5 MCG/ACT inhaler Inhale 1 puff into the lungs daily         Allergies   Allergen Reactions    Trazodone Anxiety     Tried  X  2.           Social History     Tobacco Use    Smoking status: Former     Packs/day: 1     Types: Cigarettes     Quit date: 2011     Years since quittin.0     Passive exposure: Past    Smokeless tobacco: Never   Substance Use Topics    Alcohol use: Not on file             Review of Systems    Review of Systems  Constitutional, HEENT, cardiovascular, pulmonary, gi and gu systems are negative, except as otherwise noted.  Objective    /84 (BP Location: Right  "arm, Patient Position: Sitting, Cuff Size: Adult Regular)   Pulse 93   Temp (!) 96.4  F (35.8  C) (Tympanic)   Resp 12   Ht 1.626 m (5' 4\")   Wt 73.5 kg (162 lb)   LMP  (LMP Unknown)   SpO2 93%   BMI 27.81 kg/m     Estimated body mass index is 27.81 kg/m  as calculated from the following:    Height as of this encounter: 1.626 m (5' 4\").    Weight as of this encounter: 73.5 kg (162 lb).  Physical Exam  GENERAL: alert and no distress  NECK: no adenopathy, no asymmetry, masses, or scars  RESP: lungs clear to auscultation - no rales, rhonchi or wheezes  CV: regular rate and rhythm, normal S1 S2, no S3 or S4, no murmur, click or rub, no peripheral edema  ABDOMEN: soft, nontender, no hepatosplenomegaly, no masses and bowel sounds normal  MS: no gross musculoskeletal defects noted, no edema    No results for input(s): \"HGB\", \"PLT\", \"INR\", \"NA\", \"POTASSIUM\", \"CR\", \"A1C\" in the last 13260 hours.     Diagnostics  No labs were ordered during this visit.   No EKG required for low risk surgery (cataract, skin procedure, breast biopsy, etc).    Revised Cardiac Risk Index (RCRI)  The patient has the following serious cardiovascular risks for perioperative complications:   - Cerebrovascular Disease (TIA or CVA) = 1 point     RCRI Interpretation: 1 point: Class II (low risk - 0.9% complication rate)         Signed Electronically by: Bernard Dillon MD  Copy of this evaluation report is provided to requesting physician.         Answers submitted by the patient for this visit:  Patient Health Questionnaire (Submitted on 2/9/2024)  If you checked off any problems, how difficult have these problems made it for you to do your work, take care of things at home, or get along with other people?: Very difficult  PHQ9 TOTAL SCORE: 5    "

## 2024-02-26 DIAGNOSIS — M79.2 NEUROPATHIC PAIN: ICD-10-CM

## 2024-02-26 RX ORDER — OXYCODONE HYDROCHLORIDE 5 MG/1
5 TABLET ORAL
Qty: 30 TABLET | Refills: 0 | Status: SHIPPED | OUTPATIENT
Start: 2024-02-26 | End: 2024-03-21

## 2024-03-07 DIAGNOSIS — M79.2 NEUROPATHIC PAIN: ICD-10-CM

## 2024-03-07 RX ORDER — CARISOPRODOL 250 MG/1
250 TABLET ORAL 3 TIMES DAILY PRN
Qty: 90 TABLET | Refills: 5 | Status: SHIPPED | OUTPATIENT
Start: 2024-03-07 | End: 2024-05-07

## 2024-03-15 ENCOUNTER — NURSING HOME VISIT (OUTPATIENT)
Dept: GERIATRICS | Facility: CLINIC | Age: 69
End: 2024-03-15
Payer: MEDICARE

## 2024-03-15 DIAGNOSIS — I63.9 CEREBROVASCULAR ACCIDENT (CVA), UNSPECIFIED MECHANISM (H): ICD-10-CM

## 2024-03-15 DIAGNOSIS — F11.20 CONTINUOUS OPIOID DEPENDENCE (H): ICD-10-CM

## 2024-03-15 DIAGNOSIS — M79.2 NEUROPATHIC PAIN: Primary | ICD-10-CM

## 2024-03-15 DIAGNOSIS — G81.94 HEMIPARESIS, LEFT (H): ICD-10-CM

## 2024-03-15 PROCEDURE — 99308 SBSQ NF CARE LOW MDM 20: CPT | Performed by: FAMILY MEDICINE

## 2024-03-15 NOTE — PROGRESS NOTES
[facility] GERIATRICS    No chief complaint on file.    HPI:  Anna Mathew is a 68 year old  (1955), who is being seen today for an episodic care visit at: [facility] AND REHAB ([facility]) [[id number]]. Today's concern is: Worsening pain  Notes some increased right facial pain. No injury. Some issuses since cva  Allergies, and PMH/PSH reviewed in EPIC today.  REVIEW OF SYSTEMS:  4 point ROS including Respiratory, CV, GI and , other than that noted in the HPI,  is negative    Objective:   LMP  (LMP Unknown)   GENERAL APPEARANCE:  Alert, in no distress  NEURO:   Left-sided hemiparesis  No new facial findings      Assessment/Plan:  (M79.2) Neuropathic pain  (primary encounter diagnosis)  Comment: try voltaren gel  Plan:     (I63.9) Cerebrovascular accident (CVA), unspecified mechanism (H)  Comment:   Plan:     (G81.94) Hemiparesis, left (H)  Comment:   Plan:     (F11.20) Continuous opioid dependence (H)  Comment:   Plan:         MED REC REQUIRED  Post Medication Reconciliation Status:       Orders:      Stanford Gayle MD on 3/15/2024

## 2024-03-21 DIAGNOSIS — M79.2 NEUROPATHIC PAIN: ICD-10-CM

## 2024-03-21 RX ORDER — OXYCODONE HYDROCHLORIDE 5 MG/1
5 TABLET ORAL
Qty: 30 TABLET | Refills: 0 | Status: SHIPPED | OUTPATIENT
Start: 2024-03-21 | End: 2024-04-22

## 2024-04-05 ENCOUNTER — NURSING HOME VISIT (OUTPATIENT)
Dept: GERIATRICS | Facility: CLINIC | Age: 69
End: 2024-04-05
Payer: MEDICARE

## 2024-04-05 DIAGNOSIS — M79.2 NEUROPATHIC PAIN: ICD-10-CM

## 2024-04-05 DIAGNOSIS — I63.9 CEREBROVASCULAR ACCIDENT (CVA), UNSPECIFIED MECHANISM (H): ICD-10-CM

## 2024-04-05 DIAGNOSIS — R51.9 CHRONIC NONINTRACTABLE HEADACHE, UNSPECIFIED HEADACHE TYPE: Primary | ICD-10-CM

## 2024-04-05 DIAGNOSIS — G81.94 HEMIPARESIS, LEFT (H): ICD-10-CM

## 2024-04-05 DIAGNOSIS — G89.29 CHRONIC NONINTRACTABLE HEADACHE, UNSPECIFIED HEADACHE TYPE: Primary | ICD-10-CM

## 2024-04-05 PROCEDURE — 99309 SBSQ NF CARE MODERATE MDM 30: CPT | Performed by: FAMILY MEDICINE

## 2024-04-05 NOTE — PROGRESS NOTES
[facility] GERIATRICS    No chief complaint on file.    HPI:  Anna Mathew is a 68 year old  (1955), who is being seen today for an episodic care visit at: [facility] AND REHAB ([facility]) [[id number]]. Today's concern is: Asked to see patient today for continued issues with headache.  She has had a headache despite her oxycodone Tylenol Cymbalta gabapentin she has had it for many months now.  Getting harder to control.    Allergies, and PMH/PSH reviewed in EPIC today.  REVIEW OF SYSTEMS:  4 point ROS including Respiratory, CV, GI and , other than that noted in the HPI,  is negative    Objective:   LMP  (LMP Unknown)   GENERAL APPEARANCE:  Alert, in no distress  ENT:  Mouth and posterior oropharynx normal, moist mucous membranes  EYES:  EOM, conjunctivae, lids, pupils and irises normal  NEURO:   Cranial nerves 2-12 are normal tested and grossly at patient's baseline        Assessment/Plan:  (R51.9,  G89.29) Chronic nonintractable headache, unspecified headache type  (primary encounter diagnosis)  Comment: Patient with chronic persistent headache which fitting is worsening  MRI neg Add topomax  Plan:     (M79.2) Neuropathic pain  Comment: Unchanged  Plan:     (G81.94) Hemiparesis, left (H)  Comment:   Plan:     (I63.9) Cerebrovascular accident (CVA), unspecified mechanism (H)  Comment:   Plan:         MED REC REQUIRED  Post Medication Reconciliation Status:       Stanford Gayle MD on 4/5/2024

## 2024-04-11 ENCOUNTER — NURSING HOME VISIT (OUTPATIENT)
Dept: GERIATRICS | Facility: CLINIC | Age: 69
End: 2024-04-11
Payer: MEDICARE

## 2024-04-11 DIAGNOSIS — M35.3 POLYMYALGIA RHEUMATICA (H): Primary | ICD-10-CM

## 2024-04-11 DIAGNOSIS — G81.94 HEMIPARESIS, LEFT (H): ICD-10-CM

## 2024-04-11 DIAGNOSIS — I63.9 CEREBROVASCULAR ACCIDENT (CVA), UNSPECIFIED MECHANISM (H): ICD-10-CM

## 2024-04-11 DIAGNOSIS — M79.2 NEUROPATHIC PAIN: ICD-10-CM

## 2024-04-11 DIAGNOSIS — R51.9 CHRONIC NONINTRACTABLE HEADACHE, UNSPECIFIED HEADACHE TYPE: ICD-10-CM

## 2024-04-11 DIAGNOSIS — G89.29 CHRONIC NONINTRACTABLE HEADACHE, UNSPECIFIED HEADACHE TYPE: ICD-10-CM

## 2024-04-11 PROCEDURE — 99309 SBSQ NF CARE MODERATE MDM 30: CPT | Performed by: FAMILY MEDICINE

## 2024-04-11 NOTE — PROGRESS NOTES
[facility] GERIATRICS    No chief complaint on file.    HPI:  Anna Mathew is a 68 year old  (1955), who is being seen today for an episodic care visit at: [facility] AND REHAB ([facility]) [[id number]]. Today's concern is: Follow-up on labs and headache    Allergies, and PMH/PSH reviewed in EPIC today.  REVIEW OF SYSTEMS:  4 point ROS including Respiratory, CV, GI and , other than that noted in the HPI,  is negative    Objective:   LMP  (LMP Unknown)   GENERAL APPEARANCE:  Alert, in no distress  NEURO:   Cranial nerves 2-12 are normal tested and grossly at patient's baseline        Assessment/Plan:  (M35.3) Polymyalgia rheumatica (H24)  (primary encounter diagnosis)  Comment: Will initiate prednisone follow her inflammatory markers  Plan:     (M79.2) Neuropathic pain  Comment: Continue with current medications  Plan:     (R51.9,  G89.29) Chronic nonintractable headache, unspecified headache type  Comment:   Plan:     (G81.94) Hemiparesis, left (H)  Comment:   Plan:     (I63.9) Cerebrovascular accident (CVA), unspecified mechanism (H)  Comment:   Plan:         MED REC REQUIRED  Post Medication Reconciliation Status:       Orders:      Stanford Gayle MD on 4/11/2024

## 2024-04-22 DIAGNOSIS — M79.2 NEUROPATHIC PAIN: ICD-10-CM

## 2024-04-22 RX ORDER — OXYCODONE HYDROCHLORIDE 5 MG/1
5 TABLET ORAL
Qty: 30 TABLET | Refills: 0 | Status: SHIPPED | OUTPATIENT
Start: 2024-04-22 | End: 2024-05-28

## 2024-05-07 DIAGNOSIS — M79.2 NEUROPATHIC PAIN: ICD-10-CM

## 2024-05-07 RX ORDER — CARISOPRODOL 250 MG/1
250 TABLET ORAL 3 TIMES DAILY PRN
Qty: 90 TABLET | Refills: 5 | Status: SHIPPED | OUTPATIENT
Start: 2024-05-07 | End: 2024-06-10

## 2024-05-10 ENCOUNTER — NURSING HOME VISIT (OUTPATIENT)
Dept: GERIATRICS | Facility: CLINIC | Age: 69
End: 2024-05-10
Payer: MEDICARE

## 2024-05-10 DIAGNOSIS — I63.9 CEREBROVASCULAR ACCIDENT (CVA), UNSPECIFIED MECHANISM (H): ICD-10-CM

## 2024-05-10 DIAGNOSIS — R51.9 CHRONIC NONINTRACTABLE HEADACHE, UNSPECIFIED HEADACHE TYPE: ICD-10-CM

## 2024-05-10 DIAGNOSIS — M79.2 NEUROPATHIC PAIN: ICD-10-CM

## 2024-05-10 DIAGNOSIS — M35.3 POLYMYALGIA RHEUMATICA (H): Primary | ICD-10-CM

## 2024-05-10 DIAGNOSIS — G89.29 CHRONIC NONINTRACTABLE HEADACHE, UNSPECIFIED HEADACHE TYPE: ICD-10-CM

## 2024-05-10 DIAGNOSIS — G81.94 HEMIPARESIS, LEFT (H): ICD-10-CM

## 2024-05-10 PROCEDURE — 99308 SBSQ NF CARE LOW MDM 20: CPT | Performed by: FAMILY MEDICINE

## 2024-05-10 NOTE — PROGRESS NOTES
[facility] GERIATRICS    No chief complaint on file.    HPI:  Anna Mathew is a 68 year old  (1955), who is being seen today for an episodic care visit at: [facility] AND REHAB ([facility]) [[id number]]. Today's concern is: Patient notes she does not feel well on the prednisone    Allergies, and PMH/PSH reviewed in EPIC today.  REVIEW OF SYSTEMS:  4 point ROS including Respiratory, CV, GI and , other than that noted in the HPI,  is negative    Objective:   LMP  (LMP Unknown)   GENERAL APPEARANCE:  Alert, in no distress  EYES:  EOM, conjunctivae, lids, pupils and irises normal  NECK:  No adenopathy,masses or thyromegaly  RESP:  lungs clear to auscultation   CV:  regular rate and rhythm, no murmur, rub, or gallop  NEURO:   Cranial nerves 2-12 are normal tested and grossly at patient's baseline  Hemiparesis noted      Assessment/Plan:  (M35.3) Polymyalgia rheumatica (H24)  (primary encounter diagnosis)  Comment: Inflammatory markers are improved we will decrease her dose of prednisone  Plan:     (R51.9,  G89.29) Chronic nonintractable headache, unspecified headache type  Comment:   Plan:     (G81.94) Hemiparesis, left (H)  Comment:   Plan:     (I63.9) Cerebrovascular accident (CVA), unspecified mechanism (H)  Comment:   Plan:     (M79.2) Neuropathic pain  Comment:   Plan:       Stanford Gayle MD on 5/10/2024

## 2024-05-28 DIAGNOSIS — M79.2 NEUROPATHIC PAIN: ICD-10-CM

## 2024-05-28 RX ORDER — OXYCODONE HYDROCHLORIDE 5 MG/1
5 TABLET ORAL
Qty: 30 TABLET | Refills: 0 | Status: SHIPPED | OUTPATIENT
Start: 2024-05-28 | End: 2024-06-14

## 2024-06-10 DIAGNOSIS — M79.2 NEUROPATHIC PAIN: ICD-10-CM

## 2024-06-10 RX ORDER — CARISOPRODOL 250 MG/1
250 TABLET ORAL 3 TIMES DAILY PRN
Qty: 90 TABLET | Refills: 5 | Status: SHIPPED | OUTPATIENT
Start: 2024-06-10 | End: 2024-06-17

## 2024-06-14 DIAGNOSIS — M79.2 NEUROPATHIC PAIN: ICD-10-CM

## 2024-06-14 RX ORDER — OXYCODONE HYDROCHLORIDE 5 MG/1
5 TABLET ORAL
Qty: 30 TABLET | Refills: 0 | Status: SHIPPED | OUTPATIENT
Start: 2024-06-14 | End: 2024-07-18

## 2024-06-17 DIAGNOSIS — M79.2 NEUROPATHIC PAIN: ICD-10-CM

## 2024-06-17 RX ORDER — CARISOPRODOL 250 MG/1
250 TABLET ORAL 3 TIMES DAILY
Qty: 90 TABLET | Refills: 5 | Status: SHIPPED | OUTPATIENT
Start: 2024-06-17 | End: 2024-08-13

## 2024-06-24 ENCOUNTER — NURSING HOME VISIT (OUTPATIENT)
Dept: GERIATRICS | Facility: CLINIC | Age: 69
End: 2024-06-24
Payer: MEDICARE

## 2024-06-24 DIAGNOSIS — R41.89 COGNITIVE IMPAIRMENT: ICD-10-CM

## 2024-06-24 DIAGNOSIS — G81.94 HEMIPARESIS, LEFT (H): Primary | ICD-10-CM

## 2024-06-24 DIAGNOSIS — M79.2 NEUROPATHIC PAIN: ICD-10-CM

## 2024-06-24 PROCEDURE — 99308 SBSQ NF CARE LOW MDM 20: CPT | Performed by: FAMILY MEDICINE

## 2024-06-24 NOTE — PROGRESS NOTES
[facility] GERIATRICS    Chief complaint: Neuropathic pain      HPI:  Anna Mathew is a 69 year old  (1955), who is being seen today for an episodic care visit at: [facility] ([facility]) [[id number]]. Today's concern is: Resident continues to have trouble with neuropathic pain.  She describes a tingling sensation on the left side.  It affects her face.  She has had trouble with this in the past.  She is on duloxetine, gabapentin, ibuprofen, and oxycodone.    Allergies, and PMH/PSH reviewed in EPIC today.  REVIEW OF SYSTEMS:  Unobtainable secondary to cognitive impairment.     Objective:   LMP  (LMP Unknown)   GENERAL APPEARANCE:  in no distress, appears ill  RESP:  diminished breath sounds throughout  CV:  rate-normal  PSYCH:  memory impaired         Assessment/Plan:  1. Hemiparesis, left (H)    2. Neuropathic pain    3. Cognitive impairment      Resident is unclear as to the severity of her discomfort.  She is on numerous medications for pain.  Continue to monitor for any significant change in her condition.  No changes in pain management today.    MED REC REQUIRED  Post Medication Reconciliation Status:         Orders:        Electronically signed by: Herson Marshall MD

## 2024-07-11 ENCOUNTER — NURSING HOME VISIT (OUTPATIENT)
Dept: GERIATRICS | Facility: CLINIC | Age: 69
End: 2024-07-11
Payer: MEDICARE

## 2024-07-11 DIAGNOSIS — G81.94 HEMIPARESIS, LEFT (H): ICD-10-CM

## 2024-07-11 DIAGNOSIS — M79.2 NEUROPATHIC PAIN: Primary | ICD-10-CM

## 2024-07-11 DIAGNOSIS — R41.89 COGNITIVE IMPAIRMENT: ICD-10-CM

## 2024-07-11 PROCEDURE — 99308 SBSQ NF CARE LOW MDM 20: CPT | Performed by: FAMILY MEDICINE

## 2024-07-11 NOTE — PROGRESS NOTES
[facility] GERIATRICS    No chief complaint on file.    HPI:  Anna Mathew is a 69 year old  (1955), who is being seen today for an episodic care visit at: [facility] AND REHAB ([facility]) [[id number]]. Today's concern is: This is an acute care visit because of patient's chronic neuropathic pain since her stroke. CO buzzing from meds Wants to cut back    Allergies, and PMH/PSH reviewed in [facility] today.  REVIEW OF SYSTEMS:  4 point ROS including Respiratory, CV, GI and , other than that noted in the HPI,  is negative    Objective:   LMP  (LMP Unknown)   GENERAL APPEARANCE:  in no distress  NEURO:   Left-sided hemiplegia      Patient Active Problem List   Diagnosis    Seizure disorder (H)    Hemiparesis, left (H)    Respiratory failure (H)    Cognitive impairment    Type 2 diabetes mellitus with other specified complication, without long-term current use of insulin (H)    COPD (chronic obstructive pulmonary disease) (H)    Anxiety    Cerebrovascular accident (CVA) (H)    Impaired mobility    Left homonymous hemianopsia    Major depressive disorder    Neuropathic pain    Irritation of left eye    Episode of recurrent major depressive disorder, unspecified depression episode severity (H24)    Continuous opioid dependence (H)     Current Outpatient Medications   Medication Sig Dispense Refill    acetaminophen (TYLENOL) 325 MG tablet Take 2 tablets by mouth 2 times daily      albuterol (PROAIR HFA/PROVENTIL HFA/VENTOLIN HFA) 108 (90 Base) MCG/ACT inhaler       ARNUITY ELLIPTA 100 MCG/ACT inhaler       aspirin 81 MG EC tablet Take 81 mg by mouth daily      bisacodyl (DULCOLAX) 10 MG suppository Place 1 suppository rectally daily.      buPROPion (WELLBUTRIN) 75 MG tablet Take 150 mg by mouth daily      carisoprodol (SOMA) 250 MG tablet Take 1 tablet (250 mg) by mouth 3 times daily 90 tablet 5    diclofenac (VOLTAREN) 1 % topical gel Apply 2 g topically 4 times daily as needed for moderate pain      DULoxetine  (CYMBALTA) 60 MG capsule Take 60 mg by mouth daily      famotidine (PEPCID) 20 MG tablet Take 20 mg by mouth 2 times daily.      gabapentin (NEURONTIN) 300 MG capsule Take 3 capsules (900 mg) by mouth 3 times daily      ibuprofen (ADVIL/MOTRIN) 200 MG tablet Take 200 mg by mouth every 6 hours as needed for pain      ketorolac (ACULAR) 0.5 % ophthalmic solution Apply 1 drop to eye 4 times daily      lidocaine (LIDODERM) 5 % patch Place 1 patch onto the skin every 24 hours To prevent lidocaine toxicity, patient should be patch free for 12 hrs daily. On for 12 hours, off for 12 hours. 30 patch 11    magnesium hydroxide (MOM) 2400 MG/10ML SUSP Take 30 mLs by mouth daily as needed for constipation      moxifloxacin (VIGAMOX) 0.5 % ophthalmic solution Apply 1 drop to eye      naloxone (NARCAN) 4 MG/0.1ML nasal spray Spray 1 spray (4 mg) into one nostril alternating nostrils once as needed for opioid reversal every 2-3 minutes until assistance arrives 0.2 mL 1    Nutritional Supplements (PROSOURCE PLUS PO) Take 30 mLs by mouth daily.      omeprazole (PRILOSEC) 20 MG DR capsule Take 20 mg by mouth daily      oxyCODONE (ROXICODONE) 5 MG tablet Take 1 tablet (5 mg) by mouth nightly as needed for severe pain 30 tablet 0    polyethylene glycol (MIRALAX) 17 g packet Take 1 packet by mouth daily      polyethylene glycol-propylene glycol PF (SYSTANE PRESERVATIVE FREE) 0.4-0.3 % SOLN opthalmic solution Apply 2 drops to eye      prednisoLONE acetate (PRED FORTE) 1 % ophthalmic suspension Apply 1 drop to eye      umeclidinium (INCRUSE ELLIPTA) 62.5 MCG/ACT inhaler Inhale 1 puff into the lungs daily       No current facility-administered medications for this visit.       Assessment/Plan:  (M79.2) Neuropathic pain  (primary encounter diagnosis)  Comment: Challenging situation and multiple medications already reluctant to go up on narcotics anymore. Likely side affect of meds  Plan: decrease gabapentin    (G81.94) Hemiparesis, left  (H)  Comment:   Plan:     (R41.89) Cognitive impairment  Comment:   Plan:         MED REC REQUIRED  Post Medication Reconciliation Status:       Stanford Gayle MD on 7/11/2024

## 2024-07-18 DIAGNOSIS — M79.2 NEUROPATHIC PAIN: ICD-10-CM

## 2024-07-18 RX ORDER — OXYCODONE HYDROCHLORIDE 5 MG/1
5 TABLET ORAL
Qty: 30 TABLET | Refills: 0 | Status: SHIPPED | OUTPATIENT
Start: 2024-07-18 | End: 2024-08-13

## 2024-08-13 DIAGNOSIS — M79.2 NEUROPATHIC PAIN: ICD-10-CM

## 2024-08-13 RX ORDER — CARISOPRODOL 250 MG/1
250 TABLET ORAL 3 TIMES DAILY
Qty: 90 TABLET | Refills: 5 | Status: SHIPPED | OUTPATIENT
Start: 2024-08-13

## 2024-08-13 RX ORDER — OXYCODONE HYDROCHLORIDE 5 MG/1
5 TABLET ORAL
Qty: 30 TABLET | Refills: 0 | Status: SHIPPED | OUTPATIENT
Start: 2024-08-13 | End: 2024-09-12

## 2024-08-15 ENCOUNTER — NURSING HOME VISIT (OUTPATIENT)
Dept: GERIATRICS | Facility: CLINIC | Age: 69
End: 2024-08-15
Payer: MEDICARE

## 2024-08-15 DIAGNOSIS — G81.94 HEMIPARESIS, LEFT (H): ICD-10-CM

## 2024-08-15 DIAGNOSIS — M79.2 NEUROPATHIC PAIN: Primary | ICD-10-CM

## 2024-08-15 DIAGNOSIS — R41.89 COGNITIVE IMPAIRMENT: ICD-10-CM

## 2024-08-15 PROCEDURE — 99308 SBSQ NF CARE LOW MDM 20: CPT | Performed by: FAMILY MEDICINE

## 2024-08-15 NOTE — PROGRESS NOTES
[facility] GERIATRICS    No chief complaint on file.    HPI:  Anna Mathew is a 69 year old  (1955), who is being seen today for an episodic care visit at: [facility] AND REHAB ([facility]) [[id number]]. Today's concern is: Asked to see patient for continued problems with her neuropathic pain from her stroke  Fells like she is overmediacated  Allergies, and PMH/PSH reviewed in EPIC today.  REVIEW OF SYSTEMS:  4 point ROS including Respiratory, CV, GI and , other than that noted in the HPI,  is negative    Objective:   LMP  (LMP Unknown)   GENERAL APPEARANCE:  Alert, in no distress  NEURO:   Left-sided hemiparesis        Assessment/Plan:  (M79.2) Neuropathic pain  (primary encounter diagnosis)  Comment: trial off of soma  Plan:     (R41.89) Cognitive impairment  Comment:   Plan:     (G81.94) Hemiparesis, left (H)  Comment:   Plan:       Stanford Gayle MD on 8/15/2024

## 2024-08-28 ENCOUNTER — TRANSFERRED RECORDS (OUTPATIENT)
Dept: HEALTH INFORMATION MANAGEMENT | Facility: CLINIC | Age: 69
End: 2024-08-28
Payer: MEDICARE

## 2024-08-28 LAB — RETINOPATHY: NEGATIVE

## 2024-09-06 ENCOUNTER — MEDICAL CORRESPONDENCE (OUTPATIENT)
Dept: HEALTH INFORMATION MANAGEMENT | Facility: CLINIC | Age: 69
End: 2024-09-06
Payer: MEDICARE

## 2024-09-09 ENCOUNTER — TELEPHONE (OUTPATIENT)
Dept: FAMILY MEDICINE | Facility: CLINIC | Age: 69
End: 2024-09-09
Payer: MEDICARE

## 2024-09-09 NOTE — TELEPHONE ENCOUNTER
[facility] and rehab fax needs order for pt. to have a video swallow alexandre gave to TFS

## 2024-09-12 DIAGNOSIS — M79.2 NEUROPATHIC PAIN: ICD-10-CM

## 2024-09-12 RX ORDER — OXYCODONE HYDROCHLORIDE 5 MG/1
5 TABLET ORAL
Qty: 30 TABLET | Refills: 0 | Status: SHIPPED | OUTPATIENT
Start: 2024-09-12

## 2024-09-16 ENCOUNTER — NURSING HOME VISIT (OUTPATIENT)
Dept: GERIATRICS | Facility: CLINIC | Age: 69
End: 2024-09-16
Payer: MEDICARE

## 2024-09-16 DIAGNOSIS — M79.2 NEUROPATHIC PAIN: ICD-10-CM

## 2024-09-16 DIAGNOSIS — G81.94 HEMIPARESIS, LEFT (H): Primary | ICD-10-CM

## 2024-09-16 PROCEDURE — 99308 SBSQ NF CARE LOW MDM 20: CPT | Performed by: FAMILY MEDICINE

## 2024-09-16 NOTE — PROGRESS NOTES
[facility] GERIATRICS    Chief complaint: Ongoing pain  HPI:  Anna Mathew is a 69 year old  (1955), who is being seen today for an episodic care visit at: [facility] ([facility]) [[id number]]. Today's concern is: Resident was recently in the emergency department complaining of pain.  She received 1 oxycodone and pain resolved.  She continues to complain of neuropathic type pain.  She does not like gabapentin and this has been weaned.  She is currently on duloxetine.    Allergies, and PMH/PSH reviewed in [employer] today.  REVIEW OF SYSTEMS:  4 point ROS including Respiratory, CV, GI and , other than that noted in the HPI,  is negative    Objective:   LMP  (LMP Unknown)   GENERAL APPEARANCE:  Alert, in no distress  RESP:  no respiratory distress  CV:  rate-normal  PSYCH:  insight and judgement impaired        Assessment/Plan:  1. Hemiparesis, left (H)    2. Neuropathic pain    Medication has been reviewed.  Will avoid use of further opiates.  Increase duloxetine to 60 mg twice daily and follow-up in the next couple of weeks.    MED REC REQUIRED  Post Medication Reconciliation Status:         Orders:        Electronically signed by: Herson Marshall MD

## 2024-09-23 ENCOUNTER — TELEPHONE (OUTPATIENT)
Dept: FAMILY MEDICINE | Facility: CLINIC | Age: 69
End: 2024-09-23

## 2024-09-23 ENCOUNTER — NURSING HOME VISIT (OUTPATIENT)
Dept: GERIATRICS | Facility: CLINIC | Age: 69
End: 2024-09-23
Payer: MEDICARE

## 2024-09-23 DIAGNOSIS — I63.9 CEREBROVASCULAR ACCIDENT (CVA), UNSPECIFIED MECHANISM (H): Primary | ICD-10-CM

## 2024-09-23 DIAGNOSIS — M79.2 NEUROPATHIC PAIN: ICD-10-CM

## 2024-09-23 DIAGNOSIS — M79.2 NEUROPATHIC PAIN: Primary | ICD-10-CM

## 2024-09-23 PROCEDURE — 99308 SBSQ NF CARE LOW MDM 20: CPT | Performed by: FAMILY MEDICINE

## 2024-09-23 RX ORDER — PREGABALIN 25 MG/1
25 CAPSULE ORAL 2 TIMES DAILY
Qty: 60 CAPSULE | Refills: 0 | Status: SHIPPED | OUTPATIENT
Start: 2024-09-23 | End: 2024-10-04

## 2024-09-23 NOTE — PROGRESS NOTES
[facility] GERIATRICS    Chief complaint: Neuropathic pain  HPI:  Anna Mathew is a 69 year old  (1955), who is being seen today for an episodic care visit at: [facility] ([facility]) [[id number]]. Today's concern is: Ongoing neuropathic pain    Resident had an adjustment in medications at her last visit.  Duloxetine was increased to 60 mg total daily.  She says this is not working for her.  She is complaining of pain in her anterior thighs today right greater than left.  She has been on pregabalin, gabapentin, and opioids in the past.    Allergies, and PMH/PSH reviewed in [facility] today.  REVIEW OF SYSTEMS:  4 point ROS including Respiratory, CV, GI and , other than that noted in the HPI,  is negative    Objective:   LMP  (LMP Unknown)   GENERAL APPEARANCE:  Alert, in no distress  RESP:  no respiratory distress  CV:  rate-normal  PSYCH:  affect and mood normal        Assessment/Plan:  1. Cerebrovascular accident (CVA), unspecified mechanism (H)    2. Neuropathic pain        MED REC REQUIRED  Post Medication Reconciliation Status:         Orders:  She will continue her current medications and I have added pregabalin starting at 50 mg twice daily.  Hopefully this with the additional duloxetine will help improve her pain.  She has a neurology visit in the coming months.      Electronically signed by: Herson Marshall MD

## 2024-10-04 DIAGNOSIS — M79.2 NEUROPATHIC PAIN: ICD-10-CM

## 2024-10-04 RX ORDER — PREGABALIN 25 MG/1
25 CAPSULE ORAL 2 TIMES DAILY
Qty: 60 CAPSULE | Refills: 0 | Status: SHIPPED | OUTPATIENT
Start: 2024-10-04 | End: 2024-10-08

## 2024-10-08 DIAGNOSIS — M79.2 NEUROPATHIC PAIN: ICD-10-CM

## 2024-10-08 RX ORDER — PREGABALIN 25 MG/1
25 CAPSULE ORAL 2 TIMES DAILY
Qty: 60 CAPSULE | Refills: 0 | Status: SHIPPED | OUTPATIENT
Start: 2024-10-08 | End: 2024-10-10

## 2024-10-10 DIAGNOSIS — M79.2 NEUROPATHIC PAIN: ICD-10-CM

## 2024-10-10 RX ORDER — OXYCODONE HYDROCHLORIDE 5 MG/1
5 TABLET ORAL
Qty: 30 TABLET | Refills: 0 | Status: SHIPPED | OUTPATIENT
Start: 2024-10-10 | End: 2024-11-11

## 2024-10-10 RX ORDER — PREGABALIN 50 MG/1
50 CAPSULE ORAL 2 TIMES DAILY
Qty: 60 CAPSULE | Refills: 0 | Status: SHIPPED | OUTPATIENT
Start: 2024-10-10 | End: 2024-10-28

## 2024-10-28 DIAGNOSIS — M79.2 NEUROPATHIC PAIN: ICD-10-CM

## 2024-10-28 RX ORDER — PREGABALIN 50 MG/1
50 CAPSULE ORAL 2 TIMES DAILY
Qty: 60 CAPSULE | Refills: 0 | Status: SHIPPED | OUTPATIENT
Start: 2024-10-28

## 2024-11-11 DIAGNOSIS — M79.2 NEUROPATHIC PAIN: ICD-10-CM

## 2024-11-11 RX ORDER — OXYCODONE HYDROCHLORIDE 5 MG/1
5 TABLET ORAL
Qty: 30 TABLET | Refills: 0 | Status: SHIPPED | OUTPATIENT
Start: 2024-11-11

## 2025-05-21 ENCOUNTER — TELEPHONE (OUTPATIENT)
Dept: FAMILY MEDICINE | Facility: CLINIC | Age: 70
End: 2025-05-21
Payer: MEDICARE

## 2025-05-21 NOTE — TELEPHONE ENCOUNTER
Patient Quality Outreach    Patient is due for the following:   Physical Annual Wellness Visit    Action(s) Taken:   Patient being followed by provider at [facility].    Type of outreach:    Chart review performed, no outreach needed.    Questions for provider review:    None         Selena Santos MA  Chart routed to None.